# Patient Record
Sex: FEMALE | Race: WHITE | Employment: UNEMPLOYED | URBAN - METROPOLITAN AREA
[De-identification: names, ages, dates, MRNs, and addresses within clinical notes are randomized per-mention and may not be internally consistent; named-entity substitution may affect disease eponyms.]

---

## 2018-08-31 ENCOUNTER — APPOINTMENT (OUTPATIENT)
Dept: GENERAL RADIOLOGY | Age: 78
End: 2018-08-31
Payer: OTHER MISCELLANEOUS

## 2018-08-31 ENCOUNTER — HOSPITAL ENCOUNTER (OUTPATIENT)
Age: 78
Setting detail: OBSERVATION
Discharge: HOME | End: 2018-09-01
Attending: EMERGENCY MEDICINE | Admitting: SURGERY
Payer: OTHER MISCELLANEOUS

## 2018-08-31 ENCOUNTER — APPOINTMENT (OUTPATIENT)
Dept: CT IMAGING | Age: 78
End: 2018-08-31
Payer: OTHER MISCELLANEOUS

## 2018-08-31 DIAGNOSIS — T14.90XA TRAUMA: Primary | ICD-10-CM

## 2018-08-31 LAB
% INHIBITION AA: 9.2 %
% INHIBITION ADP: 18 %
ABO/RH: NORMAL
ACETAMINOPHEN LEVEL: <5 MCG/ML (ref 10–30)
ALBUMIN SERPL-MCNC: 3.9 G/DL (ref 3.5–5.2)
ALP BLD-CCNC: 92 U/L (ref 35–104)
ALT SERPL-CCNC: 10 U/L (ref 0–32)
AMPHETAMINE SCREEN, URINE: NOT DETECTED
ANGLE (CLOT STRENGTH): 77.1 DEGREE (ref 59–74)
ANION GAP SERPL CALCULATED.3IONS-SCNC: 14 MMOL/L (ref 7–16)
ANTIBODY SCREEN: NORMAL
APTT: 24.5 SEC (ref 24.5–35.1)
AST SERPL-CCNC: 16 U/L (ref 0–31)
B.E.: -4.3 MMOL/L (ref -3–3)
BACTERIA: ABNORMAL /HPF
BARBITURATE SCREEN URINE: NOT DETECTED
BENZODIAZEPINE SCREEN, URINE: NOT DETECTED
BILIRUB SERPL-MCNC: 0.4 MG/DL (ref 0–1.2)
BILIRUBIN URINE: NEGATIVE
BLOOD, URINE: ABNORMAL
BUN BLDV-MCNC: 24 MG/DL (ref 8–23)
CALCIUM SERPL-MCNC: 9.5 MG/DL (ref 8.6–10.2)
CANNABINOID SCREEN URINE: NOT DETECTED
CHLORIDE BLD-SCNC: 105 MMOL/L (ref 98–107)
CLARITY: CLEAR
CO2: 21 MMOL/L (ref 22–29)
COCAINE METABOLITE SCREEN URINE: NOT DETECTED
COHB: 0.8 % (ref 0–1.5)
COLOR: YELLOW
CREAT SERPL-MCNC: 1 MG/DL (ref 0.5–1)
CRITICAL: ABNORMAL
DATE ANALYZED: ABNORMAL
DATE OF COLLECTION: ABNORMAL
EPL-TEG: 2.7 % (ref 0–15)
ETHANOL: <10 MG/DL (ref 0–0.08)
G-TEG: 10.9 K D/SC (ref 4.5–11)
GFR AFRICAN AMERICAN: >60
GFR NON-AFRICAN AMERICAN: 53 ML/MIN/1.73
GLUCOSE BLD-MCNC: 114 MG/DL (ref 74–109)
GLUCOSE URINE: NEGATIVE MG/DL
HCO3: 19.2 MMOL/L (ref 22–26)
HCT VFR BLD CALC: 43 % (ref 34–48)
HEMOGLOBIN: 14.6 G/DL (ref 11.5–15.5)
HHB: 0.4 % (ref 0–5)
INR BLD: 1
K (CLOTTING TIME): 0.9 MIN (ref 1–3)
KETONES, URINE: NEGATIVE MG/DL
LAB: ABNORMAL
LACTIC ACID: 1.2 MMOL/L (ref 0.5–2.2)
LEUKOCYTE ESTERASE, URINE: ABNORMAL
LY30 (FIBRINOLYSIS): 2.7 % (ref 0–8)
Lab: 2102
MA (MAX AMPLITUDE): 68.5 MM (ref 50–70)
MA-AA: 66.5 MM
MA-ACTIVATED: 46.8 MM
MA-ADP: 64.6 MM
MA-TEG BASELINE: 68.5 MM
MCH RBC QN AUTO: 30.9 PG (ref 26–35)
MCHC RBC AUTO-ENTMCNC: 34 % (ref 32–34.5)
MCV RBC AUTO: 90.9 FL (ref 80–99.9)
METHADONE SCREEN, URINE: NOT DETECTED
METHB: 0.3 % (ref 0–1.5)
MODE: ABNORMAL
NITRITE, URINE: NEGATIVE
O2 CONTENT: 21.5 ML/DL
O2 SATURATION: 99.6 % (ref 92–98.5)
O2HB: 98.5 % (ref 94–97)
OPERATOR ID: 187
OPIATE SCREEN URINE: NOT DETECTED
PATIENT TEMP: 37 C
PCO2: 31.4 MMHG (ref 35–45)
PDW BLD-RTO: 14.2 FL (ref 11.5–15)
PH BLOOD GAS: 7.41 (ref 7.35–7.45)
PH UA: 5.5 (ref 5–9)
PHENCYCLIDINE SCREEN URINE: NOT DETECTED
PLATELET # BLD: 216 E9/L (ref 130–450)
PMV BLD AUTO: 10 FL (ref 7–12)
PO2: 272.6 MMHG (ref 60–100)
POTASSIUM SERPL-SCNC: 3.87 MMOL/L (ref 3.3–5.1)
POTASSIUM SERPL-SCNC: 3.9 MMOL/L (ref 3.5–5)
PROPOXYPHENE SCREEN: NOT DETECTED
PROTEIN UA: 30 MG/DL
PROTHROMBIN TIME: 11.1 SEC (ref 9.3–12.4)
R (REACTION TIME): 3 MIN (ref 5–10)
RBC # BLD: 4.73 E12/L (ref 3.5–5.5)
RBC UA: ABNORMAL /HPF (ref 0–2)
SALICYLATE, SERUM: <0.3 MG/DL (ref 0–30)
SODIUM BLD-SCNC: 140 MMOL/L (ref 132–146)
SOURCE, BLOOD GAS: ABNORMAL
SPECIFIC GRAVITY UA: >=1.03 (ref 1–1.03)
THB: 15.1 G/DL (ref 11.5–16.5)
TIME ANALYZED: 2104
TOTAL PROTEIN: 6.9 G/DL (ref 6.4–8.3)
TRICYCLIC ANTIDEPRESSANTS SCREEN SERUM: NEGATIVE NG/ML
UROBILINOGEN, URINE: 0.2 E.U./DL
WBC # BLD: 9.4 E9/L (ref 4.5–11.5)
WBC UA: ABNORMAL /HPF (ref 0–5)

## 2018-08-31 PROCEDURE — 99222 1ST HOSP IP/OBS MODERATE 55: CPT | Performed by: SURGERY

## 2018-08-31 PROCEDURE — 82805 BLOOD GASES W/O2 SATURATION: CPT

## 2018-08-31 PROCEDURE — 71250 CT THORAX DX C-: CPT

## 2018-08-31 PROCEDURE — 73630 X-RAY EXAM OF FOOT: CPT

## 2018-08-31 PROCEDURE — 86900 BLOOD TYPING SEROLOGIC ABO: CPT

## 2018-08-31 PROCEDURE — 71045 X-RAY EXAM CHEST 1 VIEW: CPT

## 2018-08-31 PROCEDURE — 85576 BLOOD PLATELET AGGREGATION: CPT

## 2018-08-31 PROCEDURE — 87088 URINE BACTERIA CULTURE: CPT

## 2018-08-31 PROCEDURE — 81001 URINALYSIS AUTO W/SCOPE: CPT

## 2018-08-31 PROCEDURE — 85730 THROMBOPLASTIN TIME PARTIAL: CPT

## 2018-08-31 PROCEDURE — 72125 CT NECK SPINE W/O DYE: CPT

## 2018-08-31 PROCEDURE — 72170 X-RAY EXAM OF PELVIS: CPT

## 2018-08-31 PROCEDURE — 99285 EMERGENCY DEPT VISIT HI MDM: CPT

## 2018-08-31 PROCEDURE — 36415 COLL VENOUS BLD VENIPUNCTURE: CPT

## 2018-08-31 PROCEDURE — G0390 TRAUMA RESPONS W/HOSP CRITI: HCPCS

## 2018-08-31 PROCEDURE — G0480 DRUG TEST DEF 1-7 CLASSES: HCPCS

## 2018-08-31 PROCEDURE — 70450 CT HEAD/BRAIN W/O DYE: CPT

## 2018-08-31 PROCEDURE — 94150 VITAL CAPACITY TEST: CPT

## 2018-08-31 PROCEDURE — 93005 ELECTROCARDIOGRAM TRACING: CPT

## 2018-08-31 PROCEDURE — 85610 PROTHROMBIN TIME: CPT

## 2018-08-31 PROCEDURE — 85347 COAGULATION TIME ACTIVATED: CPT

## 2018-08-31 PROCEDURE — 80307 DRUG TEST PRSMV CHEM ANLYZR: CPT

## 2018-08-31 PROCEDURE — 86850 RBC ANTIBODY SCREEN: CPT

## 2018-08-31 PROCEDURE — 84132 ASSAY OF SERUM POTASSIUM: CPT

## 2018-08-31 PROCEDURE — 83605 ASSAY OF LACTIC ACID: CPT

## 2018-08-31 PROCEDURE — 85384 FIBRINOGEN ACTIVITY: CPT

## 2018-08-31 PROCEDURE — 74176 CT ABD & PELVIS W/O CONTRAST: CPT

## 2018-08-31 PROCEDURE — 86901 BLOOD TYPING SEROLOGIC RH(D): CPT

## 2018-08-31 PROCEDURE — 85027 COMPLETE CBC AUTOMATED: CPT

## 2018-08-31 PROCEDURE — 2580000003 HC RX 258: Performed by: STUDENT IN AN ORGANIZED HEALTH CARE EDUCATION/TRAINING PROGRAM

## 2018-08-31 PROCEDURE — 80053 COMPREHEN METABOLIC PANEL: CPT

## 2018-08-31 RX ORDER — MORPHINE SULFATE 2 MG/ML
2 INJECTION, SOLUTION INTRAMUSCULAR; INTRAVENOUS
Status: DISCONTINUED | OUTPATIENT
Start: 2018-08-31 | End: 2018-09-01 | Stop reason: HOSPADM

## 2018-08-31 RX ORDER — SODIUM CHLORIDE 9 MG/ML
INJECTION, SOLUTION INTRAVENOUS CONTINUOUS
Status: DISCONTINUED | OUTPATIENT
Start: 2018-08-31 | End: 2018-09-01 | Stop reason: HOSPADM

## 2018-08-31 RX ORDER — ONDANSETRON 2 MG/ML
4 INJECTION INTRAMUSCULAR; INTRAVENOUS EVERY 6 HOURS PRN
Status: DISCONTINUED | OUTPATIENT
Start: 2018-08-31 | End: 2018-09-01 | Stop reason: HOSPADM

## 2018-08-31 RX ADMIN — SODIUM CHLORIDE: 9 INJECTION, SOLUTION INTRAVENOUS at 22:59

## 2018-09-01 VITALS
SYSTOLIC BLOOD PRESSURE: 145 MMHG | TEMPERATURE: 99 F | OXYGEN SATURATION: 98 % | RESPIRATION RATE: 18 BRPM | WEIGHT: 146 LBS | HEART RATE: 89 BPM | BODY MASS INDEX: 29.43 KG/M2 | DIASTOLIC BLOOD PRESSURE: 65 MMHG | HEIGHT: 59 IN

## 2018-09-01 PROBLEM — S16.1XXA NECK STRAIN: Status: ACTIVE | Noted: 2018-09-01

## 2018-09-01 PROBLEM — S06.0X0A CONCUSSION WITHOUT LOSS OF CONSCIOUSNESS: Status: ACTIVE | Noted: 2018-09-01
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
## 2018-09-01 PROBLEM — S20.20XA MULTIPLE CONTUSIONS OF TRUNK: Status: ACTIVE | Noted: 2018-09-01

## 2018-09-01 LAB
BASOPHILS ABSOLUTE: 0.02 E9/L (ref 0–0.2)
BASOPHILS RELATIVE PERCENT: 0.2 % (ref 0–2)
EOSINOPHILS ABSOLUTE: 0.06 E9/L (ref 0.05–0.5)
EOSINOPHILS RELATIVE PERCENT: 0.6 % (ref 0–6)
HCT VFR BLD CALC: 36.7 % (ref 34–48)
HEMOGLOBIN: 12.3 G/DL (ref 11.5–15.5)
IMMATURE GRANULOCYTES #: 0.05 E9/L
IMMATURE GRANULOCYTES %: 0.5 % (ref 0–5)
LYMPHOCYTES ABSOLUTE: 1.15 E9/L (ref 1.5–4)
LYMPHOCYTES RELATIVE PERCENT: 12.4 % (ref 20–42)
MCH RBC QN AUTO: 30.4 PG (ref 26–35)
MCHC RBC AUTO-ENTMCNC: 33.5 % (ref 32–34.5)
MCV RBC AUTO: 90.6 FL (ref 80–99.9)
MONOCYTES ABSOLUTE: 0.61 E9/L (ref 0.1–0.95)
MONOCYTES RELATIVE PERCENT: 6.6 % (ref 2–12)
NEUTROPHILS ABSOLUTE: 7.42 E9/L (ref 1.8–7.3)
NEUTROPHILS RELATIVE PERCENT: 79.7 % (ref 43–80)
PDW BLD-RTO: 14.2 FL (ref 11.5–15)
PLATELET # BLD: 181 E9/L (ref 130–450)
PMV BLD AUTO: 9.9 FL (ref 7–12)
RBC # BLD: 4.05 E12/L (ref 3.5–5.5)
WBC # BLD: 9.3 E9/L (ref 4.5–11.5)

## 2018-09-01 PROCEDURE — 85025 COMPLETE CBC W/AUTO DIFF WBC: CPT

## 2018-09-01 PROCEDURE — G0378 HOSPITAL OBSERVATION PER HR: HCPCS

## 2018-09-01 PROCEDURE — 2580000003 HC RX 258: Performed by: STUDENT IN AN ORGANIZED HEALTH CARE EDUCATION/TRAINING PROGRAM

## 2018-09-01 PROCEDURE — 36415 COLL VENOUS BLD VENIPUNCTURE: CPT

## 2018-09-01 RX ORDER — SODIUM CHLORIDE 0.9 % (FLUSH) 0.9 %
10 SYRINGE (ML) INJECTION PRN
Status: DISCONTINUED | OUTPATIENT
Start: 2018-09-01 | End: 2018-09-01 | Stop reason: HOSPADM

## 2018-09-01 RX ORDER — ACETAMINOPHEN 325 MG/1
650 TABLET ORAL EVERY 4 HOURS PRN
Status: DISCONTINUED | OUTPATIENT
Start: 2018-09-01 | End: 2018-09-01 | Stop reason: HOSPADM

## 2018-09-01 RX ORDER — SODIUM CHLORIDE 0.9 % (FLUSH) 0.9 %
10 SYRINGE (ML) INJECTION EVERY 12 HOURS SCHEDULED
Status: DISCONTINUED | OUTPATIENT
Start: 2018-09-01 | End: 2018-09-01 | Stop reason: HOSPADM

## 2018-09-01 RX ADMIN — SODIUM CHLORIDE: 9 INJECTION, SOLUTION INTRAVENOUS at 08:52

## 2018-09-01 ASSESSMENT — PAIN DESCRIPTION - PROGRESSION: CLINICAL_PROGRESSION: NOT CHANGED

## 2018-09-01 ASSESSMENT — PAIN SCALES - GENERAL
PAINLEVEL_OUTOF10: 5
PAINLEVEL_OUTOF10: 5

## 2018-09-01 ASSESSMENT — PAIN DESCRIPTION - LOCATION: LOCATION: FOOT

## 2018-09-01 ASSESSMENT — PAIN DESCRIPTION - PAIN TYPE: TYPE: ACUTE PAIN

## 2018-09-01 ASSESSMENT — PAIN DESCRIPTION - ONSET: ONSET: ON-GOING

## 2018-09-01 ASSESSMENT — PAIN DESCRIPTION - FREQUENCY: FREQUENCY: CONTINUOUS

## 2018-09-01 ASSESSMENT — PAIN DESCRIPTION - ORIENTATION: ORIENTATION: RIGHT

## 2018-09-01 ASSESSMENT — PAIN DESCRIPTION - DESCRIPTORS: DESCRIPTORS: ACHING;DISCOMFORT
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
## 2018-09-01 NOTE — PROGRESS NOTES
Attempted to complete patient's med rec. She states she does not know the names of medications she takes but knows she takes a med for blood pressure.
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
## 2018-09-03 LAB — URINE CULTURE, ROUTINE: NORMAL
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
## 2018-09-08 LAB
EKG ATRIAL RATE: 113 BPM
EKG P AXIS: 49 DEGREES
EKG P-R INTERVAL: 172 MS
EKG Q-T INTERVAL: 316 MS
EKG QRS DURATION: 56 MS
EKG QTC CALCULATION (BAZETT): 433 MS
EKG R AXIS: -44 DEGREES
EKG T AXIS: 91 DEGREES
EKG VENTRICULAR RATE: 113 BPM

## 2019-04-25 ENCOUNTER — HOSPITAL ENCOUNTER (EMERGENCY)
Age: 79
Discharge: HOME | DRG: 310 | End: 2019-04-25
Payer: MEDICARE

## 2019-04-25 ENCOUNTER — HOSPITAL ENCOUNTER (INPATIENT)
Age: 79
LOS: 5 days | Discharge: HOME | DRG: 310 | End: 2019-04-30
Attending: EMERGENCY MEDICINE | Admitting: INTERNAL MEDICINE
Payer: MEDICARE

## 2019-04-25 VITALS
SYSTOLIC BLOOD PRESSURE: 107 MMHG | WEIGHT: 142 LBS | RESPIRATION RATE: 20 BRPM | TEMPERATURE: 97.6 F | BODY MASS INDEX: 28.68 KG/M2 | DIASTOLIC BLOOD PRESSURE: 78 MMHG | OXYGEN SATURATION: 97 % | HEART RATE: 77 BPM

## 2019-04-25 DIAGNOSIS — R19.7 DIARRHEA, UNSPECIFIED TYPE: ICD-10-CM

## 2019-04-25 DIAGNOSIS — I48.91 NEW ONSET ATRIAL FIBRILLATION (HCC): ICD-10-CM

## 2019-04-25 DIAGNOSIS — R42 DIZZINESS: ICD-10-CM

## 2019-04-25 DIAGNOSIS — R42 LIGHTHEADEDNESS: ICD-10-CM

## 2019-04-25 DIAGNOSIS — R11.2 NON-INTRACTABLE VOMITING WITH NAUSEA, UNSPECIFIED VOMITING TYPE: ICD-10-CM

## 2019-04-25 DIAGNOSIS — I48.91 ATRIAL FIBRILLATION WITH RVR (HCC): Primary | ICD-10-CM

## 2019-04-25 DIAGNOSIS — R11.0 NAUSEA: Primary | ICD-10-CM

## 2019-04-25 PROBLEM — K52.9 GASTROENTERITIS: Status: ACTIVE | Noted: 2019-04-25

## 2019-04-25 LAB
ALBUMIN SERPL-MCNC: 3.9 G/DL (ref 3.5–5.2)
ALP BLD-CCNC: 96 U/L (ref 35–104)
ALT SERPL-CCNC: 19 U/L (ref 0–32)
ANION GAP SERPL CALCULATED.3IONS-SCNC: 14 MMOL/L (ref 7–16)
APTT: 31.7 SEC (ref 24.5–35.1)
AST SERPL-CCNC: 22 U/L (ref 0–31)
BASOPHILS ABSOLUTE: 0.04 E9/L (ref 0–0.2)
BASOPHILS RELATIVE PERCENT: 0.3 % (ref 0–2)
BILIRUB SERPL-MCNC: 0.3 MG/DL (ref 0–1.2)
BUN BLDV-MCNC: 32 MG/DL (ref 8–23)
CALCIUM SERPL-MCNC: 9.6 MG/DL (ref 8.6–10.2)
CHLORIDE BLD-SCNC: 105 MMOL/L (ref 98–107)
CO2: 19 MMOL/L (ref 22–29)
CREAT SERPL-MCNC: 1.2 MG/DL (ref 0.5–1)
EOSINOPHILS ABSOLUTE: 0.01 E9/L (ref 0.05–0.5)
EOSINOPHILS RELATIVE PERCENT: 0.1 % (ref 0–6)
GFR AFRICAN AMERICAN: 52
GFR NON-AFRICAN AMERICAN: 43 ML/MIN/1.73
GLUCOSE BLD-MCNC: 190 MG/DL (ref 74–99)
HCT VFR BLD CALC: 43.7 % (ref 34–48)
HEMOGLOBIN: 14.6 G/DL (ref 11.5–15.5)
IMMATURE GRANULOCYTES #: 0.1 E9/L
IMMATURE GRANULOCYTES %: 0.6 % (ref 0–5)
INR BLD: 1
LACTIC ACID: 2.2 MMOL/L (ref 0.5–2.2)
LIPASE: 19 U/L (ref 13–60)
LYMPHOCYTES ABSOLUTE: 1.16 E9/L (ref 1.5–4)
LYMPHOCYTES RELATIVE PERCENT: 7.5 % (ref 20–42)
MAGNESIUM: 2 MG/DL (ref 1.6–2.6)
MCH RBC QN AUTO: 30.8 PG (ref 26–35)
MCHC RBC AUTO-ENTMCNC: 33.4 % (ref 32–34.5)
MCV RBC AUTO: 92.2 FL (ref 80–99.9)
MONOCYTES ABSOLUTE: 0.36 E9/L (ref 0.1–0.95)
MONOCYTES RELATIVE PERCENT: 2.3 % (ref 2–12)
NEUTROPHILS ABSOLUTE: 13.87 E9/L (ref 1.8–7.3)
NEUTROPHILS RELATIVE PERCENT: 89.2 % (ref 43–80)
PDW BLD-RTO: 13.6 FL (ref 11.5–15)
PLATELET # BLD: 238 E9/L (ref 130–450)
PMV BLD AUTO: 10.1 FL (ref 7–12)
POTASSIUM SERPL-SCNC: 3.8 MMOL/L (ref 3.5–5)
PROTHROMBIN TIME: 11.7 SEC (ref 9.3–12.4)
RBC # BLD: 4.74 E12/L (ref 3.5–5.5)
SODIUM BLD-SCNC: 138 MMOL/L (ref 132–146)
TOTAL PROTEIN: 6.8 G/DL (ref 6.4–8.3)
TROPONIN: <0.01 NG/ML (ref 0–0.03)
WBC # BLD: 15.5 E9/L (ref 4.5–11.5)

## 2019-04-25 PROCEDURE — 83735 ASSAY OF MAGNESIUM: CPT

## 2019-04-25 PROCEDURE — 99285 EMERGENCY DEPT VISIT HI MDM: CPT

## 2019-04-25 PROCEDURE — 2500000003 HC RX 250 WO HCPCS: Performed by: STUDENT IN AN ORGANIZED HEALTH CARE EDUCATION/TRAINING PROGRAM

## 2019-04-25 PROCEDURE — 84484 ASSAY OF TROPONIN QUANT: CPT

## 2019-04-25 PROCEDURE — 96372 THER/PROPH/DIAG INJ SC/IM: CPT

## 2019-04-25 PROCEDURE — 83605 ASSAY OF LACTIC ACID: CPT

## 2019-04-25 PROCEDURE — 2060000000 HC ICU INTERMEDIATE R&B

## 2019-04-25 PROCEDURE — 6360000002 HC RX W HCPCS: Performed by: STUDENT IN AN ORGANIZED HEALTH CARE EDUCATION/TRAINING PROGRAM

## 2019-04-25 PROCEDURE — 85610 PROTHROMBIN TIME: CPT

## 2019-04-25 PROCEDURE — 6370000000 HC RX 637 (ALT 250 FOR IP): Performed by: PHYSICIAN ASSISTANT

## 2019-04-25 PROCEDURE — 93005 ELECTROCARDIOGRAM TRACING: CPT | Performed by: STUDENT IN AN ORGANIZED HEALTH CARE EDUCATION/TRAINING PROGRAM

## 2019-04-25 PROCEDURE — 99212 OFFICE O/P EST SF 10 MIN: CPT

## 2019-04-25 PROCEDURE — 85025 COMPLETE CBC W/AUTO DIFF WBC: CPT

## 2019-04-25 PROCEDURE — 85730 THROMBOPLASTIN TIME PARTIAL: CPT

## 2019-04-25 PROCEDURE — 80053 COMPREHEN METABOLIC PANEL: CPT

## 2019-04-25 PROCEDURE — 83690 ASSAY OF LIPASE: CPT

## 2019-04-25 PROCEDURE — 96374 THER/PROPH/DIAG INJ IV PUSH: CPT

## 2019-04-25 PROCEDURE — 36415 COLL VENOUS BLD VENIPUNCTURE: CPT

## 2019-04-25 PROCEDURE — 2580000003 HC RX 258: Performed by: STUDENT IN AN ORGANIZED HEALTH CARE EDUCATION/TRAINING PROGRAM

## 2019-04-25 RX ORDER — 0.9 % SODIUM CHLORIDE 0.9 %
1000 INTRAVENOUS SOLUTION INTRAVENOUS ONCE
Status: COMPLETED | OUTPATIENT
Start: 2019-04-25 | End: 2019-04-26

## 2019-04-25 RX ORDER — METOPROLOL TARTRATE 5 MG/5ML
5 INJECTION INTRAVENOUS ONCE
Status: COMPLETED | OUTPATIENT
Start: 2019-04-25 | End: 2019-04-25

## 2019-04-25 RX ORDER — PROMETHAZINE HYDROCHLORIDE 25 MG/ML
25 INJECTION, SOLUTION INTRAMUSCULAR; INTRAVENOUS ONCE
Status: COMPLETED | OUTPATIENT
Start: 2019-04-25 | End: 2019-04-25

## 2019-04-25 RX ORDER — ONDANSETRON 4 MG/1
4 TABLET, ORALLY DISINTEGRATING ORAL ONCE
Status: COMPLETED | OUTPATIENT
Start: 2019-04-25 | End: 2019-04-25

## 2019-04-25 RX ADMIN — METOPROLOL TARTRATE 5 MG: 5 INJECTION, SOLUTION INTRAVENOUS at 22:49

## 2019-04-25 RX ADMIN — SODIUM CHLORIDE 1000 ML: 9 INJECTION, SOLUTION INTRAVENOUS at 21:32

## 2019-04-25 RX ADMIN — PROMETHAZINE HYDROCHLORIDE 25 MG: 25 INJECTION INTRAMUSCULAR; INTRAVENOUS at 21:32

## 2019-04-25 RX ADMIN — ONDANSETRON 4 MG: 4 TABLET, ORALLY DISINTEGRATING ORAL at 19:44

## 2019-04-25 RX ADMIN — ENOXAPARIN SODIUM 60 MG: 60 INJECTION SUBCUTANEOUS at 22:49

## 2019-04-25 ASSESSMENT — ENCOUNTER SYMPTOMS
WHEEZING: 0
SORE THROAT: 0
DIARRHEA: 1
ABDOMINAL DISTENTION: 0
NAUSEA: 1
EYE REDNESS: 0
SHORTNESS OF BREATH: 0
EYE PAIN: 0
EYE DISCHARGE: 0
SINUS PRESSURE: 0
COUGH: 0
BACK PAIN: 0
VOMITING: 1

## 2019-04-25 NOTE — ED PROVIDER NOTES
This is an 78-year-old female presents to urgent care with her daughter. Today the patient states that she started becoming nauseated and also having some vomiting as well. Alesia Rahman His been some diarrhea patient also complains of some epigastric pain now complains of some dizziness. Patient normally states she does not have dizziness. Review of Systems   Constitutional:        Pertinent positives and negatives are stated within HPI, all other systems reviewed and are negative. Physical Exam   Constitutional: She is oriented to person, place, and time. She appears well-developed and well-nourished. HENT:   Head: Normocephalic and atraumatic. Right Ear: Hearing and external ear normal.   Left Ear: Hearing and external ear normal.   Nose: Nose normal.   Mouth/Throat: Uvula is midline, oropharynx is clear and moist and mucous membranes are normal.   Eyes: Pupils are equal, round, and reactive to light. Conjunctivae, EOM and lids are normal.   Neck: Normal range of motion. Neck supple. Cardiovascular: Normal rate, regular rhythm and normal heart sounds. No murmur heard. Pulmonary/Chest: Effort normal and breath sounds normal.   Abdominal: Soft. Bowel sounds are normal. There is tenderness in the epigastric area. There is no rigidity, no rebound, no guarding and no CVA tenderness. Musculoskeletal: She exhibits no edema. Neurological: She is alert and oriented to person, place, and time. She has normal strength. No cranial nerve deficit or sensory deficit. Coordination and gait normal. GCS eye subscore is 4. GCS verbal subscore is 5. GCS motor subscore is 6. Skin: Skin is warm and dry. No abrasion and no rash noted. Nursing note and vitals reviewed. Procedures    MDM  Number of Diagnoses or Management Options  Diarrhea, unspecified type:   Dizziness:   Nausea:   Diagnosis management comments: Patient walked back from the bathroom and had a small emesis we will give her some Zofran here.  She still feels dizzy I spoke with the daughter at length she needs to go to the emergency department tonight she will take her to the hospital. Patient is stable condition here.         --------------------------------------------- PAST HISTORY ---------------------------------------------  Past Medical History:  has a past medical history of Anxiety, Cancer ([address] DJD (degenerative joint disease), Hyperlipidemia, Hypertension, Irregular heart beat, Osteoarthritis, Preoperative clearance, Renal calculi, and Ureteral perforation secondary to stent manipulation. Past Surgical History:  has a past surgical history that includes Cholecystectomy; Hysterectomy; skin biopsy; other surgical history (05-08-15); Lithotripsy (Left, 8/05/2015); Total hip arthroplasty (Right, 06/02/2014); and Total hip arthroplasty (Left, 07/18/2016). Social History:  reports that she has never smoked. She has never used smokeless tobacco. She reports that she drinks alcohol. She reports that she does not use drugs. Family History: family history includes Heart Disease in her father; Kidney Disease in her mother. The patients home medications have been reviewed. Allergies: Iodine; Other; Cefoxitin sodium in dextrose; Hycomine compound [pe-cpm-hydrocod-apap-caff]; Iodine; and Mefoxin [cefoxitin]    -------------------------------------------------- RESULTS -------------------------------------------------  No results found for this visit on 04/25/19. No orders to display       ------------------------- NURSING NOTES AND VITALS REVIEWED ---------------------------   The nursing notes within the ED encounter and vital signs as below have been reviewed.    /78   Pulse 77   Temp 97.6 °F (36.4 °C) (Oral)   Resp 20   Wt 142 lb (64.4 kg)   SpO2 97%   BMI 28.68 kg/m²   Oxygen Saturation Interpretation: Normal      ------------------------------------------ PROGRESS NOTES ------------------------------------------   I have spoken

## 2019-04-25 NOTE — ED NOTES
Provider talked  To pt and family  Instructed to go to er for further treatment   Daughter taking per private car     Jovan Aponte, MURTAZA  62/81/13 [id number]

## 2019-04-25 NOTE — LETTER
Beneficiary Notification Letter     This Braydon Evans Provider is Participating in an Innovative Payment and [address] [place] from Medicare     Greetings:   Sherry Ayala is participating in a Medicare initiative called the [facility] for [address]. You are receiving this letter because your health care provider has identified you as a patient who is receiving care through this initiative. Health care providers participating in the [facility] [address], including Sherry Ayala, will work with Medicare to improve care for patients. Your Medicare rights have not been changed. You still have all the same Medicare rights and protections, including the right to choose which hospital, doctor, or other health care provider you see. However, because Sherry Ayala chose to participate in the [address] [place] [address], all Medicare beneficiaries who meet the eligibility criteria of this initiative will receive care under the initiative. If you do not wish to receive care under the Bundled Payments [address], you must choose a health care provider that does not participate in this initiative for your care. Regardless of which health care provider you see, Medicare will continue to cover all of your medically necessary services. Bundled Payments for Care Improvement Advanced aims to help improve your care     The Bundled Payments [place] [id number] [facility] is an innovative Medicare initiative that encourages your doctors, hospitals, and other health care providers to work more closely together so you get better care during and following certain hospital stays.  In this initiative, doctors and hospitals may work closely with certain health care providers and suppliers that help patients recover after discharge from the hospital, including skilled nursing facilities, home health agencies, inpatient rehabilitation facilities, and long term care hospitals. Sherry [employer] is working closely with the doctors and other health care providers that care for you during and following your hospital stay and for a period of time after you leave the hospital. By working together, the health care providers are trying to more efficiently provide well-managed, high quality, patient-centered care as you undergo treatment. Hospitals, doctors, and other health care providers that care for you following a hospital stay may receive an additional payment for providing better, more coordinated health care. Medicare will monitor your care to make sure you and others get high quality care. Your feedback is important     Medicare may also ask you to answer a survey about the services and care you received from [facility] will be mailed to you. Your feedback will improve care for all people with Medicare who receive care from [employer]. Completion of this survey is optional.     Get more information     For more information about the Bundled Payments for [address] [place] [address], you can:    · Visit the CMS BPCI Advanced Website at http://phillips-hyman.net/ initiatives/bpci-advanced   · Call the [facility] BPCI-A team at ([phone]. · Call 1-800-MEDICARE (3-666.377.7043). TTY users can call 0-809.225.5222     If you have concerns or complaints about your care, talk to your health care provider, or contact your Beneficiary and Family Centered Quality Improvement Organization [facility]). To get your CC-QIO's phone number, visit Medicare.gov/contacts or call 1-800-MEDICARE. · To find a different hospital, visit www. hospitalcompare.[facility].gov or call 1-800- MEDICARE (7-285.584.2019). TTY users should call 6-868.688.4840. · To find a different doctor, visit Medicare's Physician Compare website, [employer].co.nz, or call 1-800-MEDICARE (526 1279). TTY users should call 0-682.207.1517. · To find a different skilled nursing facility, visit [facility] website, https://www.[employer]/, or call 1-800-MEDICARE (1- 750.168.2848). TTY users should call 3-987.421.9807. · To find a different long term care hospital, visit [facility] [address] Compare website, [employer].be, or call 1-800- MEDICARE (652 9787). TTY users should call 8-897.548.8366. · To find a different inpatient rehabilitation facility, visit [address] [facility] [address] Compare website, www.medicare.gov/ inpatientrehabilitation facilitycompare, or call 1-800-MEDICARE (5-385.948.3209). TTY users should call 8- 972.905.7762. · To find a different home health agency, visit [address] website, www.medicare.gov/homehealthcompare, or call 1-800-MEDICARE (7-878- 313-0164). TTY users should call [phone].

## 2019-04-26 ENCOUNTER — APPOINTMENT (OUTPATIENT)
Dept: GENERAL RADIOLOGY | Age: 79
DRG: 310 | End: 2019-04-26
Payer: MEDICARE

## 2019-04-26 LAB
HBA1C MFR BLD: 5.5 % (ref 4–5.6)
LV EF: 76 %
LVEF MODALITY: NORMAL
METER GLUCOSE: 120 MG/DL (ref 74–99)
METER GLUCOSE: 135 MG/DL (ref 74–99)
METER GLUCOSE: 97 MG/DL (ref 74–99)
TROPONIN: <0.01 NG/ML (ref 0–0.03)

## 2019-04-26 PROCEDURE — 84484 ASSAY OF TROPONIN QUANT: CPT

## 2019-04-26 PROCEDURE — 97116 GAIT TRAINING THERAPY: CPT | Performed by: PHYSICAL THERAPIST

## 2019-04-26 PROCEDURE — 6360000002 HC RX W HCPCS: Performed by: NURSE PRACTITIONER

## 2019-04-26 PROCEDURE — 6370000000 HC RX 637 (ALT 250 FOR IP): Performed by: INTERNAL MEDICINE

## 2019-04-26 PROCEDURE — 71046 X-RAY EXAM CHEST 2 VIEWS: CPT

## 2019-04-26 PROCEDURE — 2060000000 HC ICU INTERMEDIATE R&B

## 2019-04-26 PROCEDURE — 82962 GLUCOSE BLOOD TEST: CPT

## 2019-04-26 PROCEDURE — 6360000002 HC RX W HCPCS: Performed by: INTERNAL MEDICINE

## 2019-04-26 PROCEDURE — 2580000003 HC RX 258: Performed by: NURSE PRACTITIONER

## 2019-04-26 PROCEDURE — 36415 COLL VENOUS BLD VENIPUNCTURE: CPT

## 2019-04-26 PROCEDURE — 97161 PT EVAL LOW COMPLEX 20 MIN: CPT | Performed by: PHYSICAL THERAPIST

## 2019-04-26 PROCEDURE — 83036 HEMOGLOBIN GLYCOSYLATED A1C: CPT

## 2019-04-26 PROCEDURE — 93306 TTE W/DOPPLER COMPLETE: CPT

## 2019-04-26 PROCEDURE — 99233 SBSQ HOSP IP/OBS HIGH 50: CPT | Performed by: INTERNAL MEDICINE

## 2019-04-26 PROCEDURE — 2580000003 HC RX 258: Performed by: INTERNAL MEDICINE

## 2019-04-26 RX ORDER — METOPROLOL TARTRATE 5 MG/5ML
5 INJECTION INTRAVENOUS EVERY 8 HOURS PRN
Status: DISCONTINUED | OUTPATIENT
Start: 2019-04-26 | End: 2019-04-29

## 2019-04-26 RX ORDER — LORAZEPAM 0.5 MG/1
0.5 TABLET ORAL EVERY 4 HOURS PRN
Status: DISCONTINUED | OUTPATIENT
Start: 2019-04-26 | End: 2019-04-29

## 2019-04-26 RX ORDER — SODIUM CHLORIDE 0.9 % (FLUSH) 0.9 %
10 SYRINGE (ML) INJECTION PRN
Status: DISCONTINUED | OUTPATIENT
Start: 2019-04-26 | End: 2019-04-30 | Stop reason: HOSPADM

## 2019-04-26 RX ORDER — DEXTROSE MONOHYDRATE 25 G/50ML
12.5 INJECTION, SOLUTION INTRAVENOUS PRN
Status: DISCONTINUED | OUTPATIENT
Start: 2019-04-26 | End: 2019-04-30 | Stop reason: HOSPADM

## 2019-04-26 RX ORDER — DIGOXIN 125 MCG
125 TABLET ORAL DAILY
Status: DISCONTINUED | OUTPATIENT
Start: 2019-04-27 | End: 2019-04-30 | Stop reason: HOSPADM

## 2019-04-26 RX ORDER — SODIUM CHLORIDE 9 MG/ML
INJECTION, SOLUTION INTRAVENOUS CONTINUOUS
Status: DISCONTINUED | OUTPATIENT
Start: 2019-04-26 | End: 2019-04-29

## 2019-04-26 RX ORDER — DIGOXIN 0.25 MG/ML
125 INJECTION INTRAMUSCULAR; INTRAVENOUS ONCE
Status: COMPLETED | OUTPATIENT
Start: 2019-04-26 | End: 2019-04-26

## 2019-04-26 RX ORDER — MECLIZINE HCL 12.5 MG/1
12.5 TABLET ORAL 3 TIMES DAILY PRN
Status: DISCONTINUED | OUTPATIENT
Start: 2019-04-26 | End: 2019-04-27

## 2019-04-26 RX ORDER — ONDANSETRON 2 MG/ML
4 INJECTION INTRAMUSCULAR; INTRAVENOUS EVERY 6 HOURS PRN
Status: DISCONTINUED | OUTPATIENT
Start: 2019-04-26 | End: 2019-04-30 | Stop reason: HOSPADM

## 2019-04-26 RX ORDER — DEXTROSE MONOHYDRATE 50 MG/ML
100 INJECTION, SOLUTION INTRAVENOUS PRN
Status: DISCONTINUED | OUTPATIENT
Start: 2019-04-26 | End: 2019-04-30 | Stop reason: HOSPADM

## 2019-04-26 RX ORDER — SODIUM CHLORIDE 0.9 % (FLUSH) 0.9 %
10 SYRINGE (ML) INJECTION EVERY 12 HOURS SCHEDULED
Status: DISCONTINUED | OUTPATIENT
Start: 2019-04-26 | End: 2019-04-30 | Stop reason: HOSPADM

## 2019-04-26 RX ORDER — NICOTINE POLACRILEX 4 MG
15 LOZENGE BUCCAL PRN
Status: DISCONTINUED | OUTPATIENT
Start: 2019-04-26 | End: 2019-04-30 | Stop reason: HOSPADM

## 2019-04-26 RX ADMIN — Medication 10 ML: at 20:07

## 2019-04-26 RX ADMIN — METOPROLOL TARTRATE 25 MG: 25 TABLET ORAL at 23:13

## 2019-04-26 RX ADMIN — ENOXAPARIN SODIUM 60 MG: 60 INJECTION SUBCUTANEOUS at 20:06

## 2019-04-26 RX ADMIN — ENOXAPARIN SODIUM 40 MG: 40 INJECTION SUBCUTANEOUS at 08:29

## 2019-04-26 RX ADMIN — SODIUM CHLORIDE: 9 INJECTION, SOLUTION INTRAVENOUS at 01:20

## 2019-04-26 RX ADMIN — ONDANSETRON 4 MG: 2 INJECTION INTRAMUSCULAR; INTRAVENOUS at 08:29

## 2019-04-26 RX ADMIN — ONDANSETRON 4 MG: 2 INJECTION INTRAMUSCULAR; INTRAVENOUS at 20:09

## 2019-04-26 RX ADMIN — DIGOXIN 125 MCG: 0.25 INJECTION INTRAMUSCULAR; INTRAVENOUS at 16:33

## 2019-04-26 RX ADMIN — METOPROLOL TARTRATE 25 MG: 25 TABLET ORAL at 12:13

## 2019-04-26 RX ADMIN — MECLIZINE 12.5 MG: 12.5 TABLET ORAL at 20:03

## 2019-04-26 RX ADMIN — SODIUM CHLORIDE: 9 INJECTION, SOLUTION INTRAVENOUS at 20:07

## 2019-04-26 NOTE — PROGRESS NOTES
[address] [place], [place] [address]ist   Progress Note    Admitting Date and Time: 4/25/2019  8:20 PM  Admit Dx: Atrial fibrillation with RVR ([address] [I48.91]    Subjective: Patient anxious this a.m. Her major complaints include nausea without vomiting, vertigo with a spinning sensation in her head, and nonproductive cough. She denies chest pain or palpitations. She feels generally better than on admission via the ED. ROS: denies fever, chills, cp, sob, n/v, HA unless stated above.  sodium chloride flush  10 mL Intravenous 2 times per day    metoprolol tartrate  25 mg Oral BID    enoxaparin  1 mg/kg Subcutaneous BID       sodium chloride flush 10 mL PRN   magnesium hydroxide 30 mL Daily PRN   ondansetron 4 mg Q6H PRN   perflutren lipid microspheres 1.5 mL ONCE PRN   metoprolol 5 mg Q8H PRN   LORazepam 0.5 mg Q4H PRN   meclizine 12.5 mg TID PRN        Objective:    /83   Pulse 119   Temp 98.8 °F (37.1 °C) (Oral)   Resp 18   Ht 4' 11\" (1.499 m)   Wt 142 lb (64.4 kg)   SpO2 96%   BMI 28.68 kg/m²   General Appearance: alert and oriented to person, place and time and in no acute distress  Skin: warm and dry mild pallor  Head: normocephalic and atraumatic  Eyes: pupils equal, round, and reactive to light, extraocular eye movements intact, conjunctivae normal; no nystagmus  Neck: neck supple and non tender without mass   Pulmonary/Chest: clear to auscultation bilaterally- no wheezes, rales or rhonchi, normal air movement, no respiratory distress  Cardiovascular: Heart has an irregular irregularity with variable intensity of S1. There is a grade 2 systolic ejection murmur throughout the precordium with radiation into the right carotid. There is no JVD at 30°.    Abdomen: soft, non-tender, non-distended, normal bowel sounds, no masses or organomegaly  Extremities: no cyanosis, no clubbing and no edema  Neurologic: no cranial nerve deficit and speech normal    Cardiac telemetry shows atrial

## 2019-04-26 NOTE — H&P
HISTORY  05-08-15    Cystoscopy retrograde pyelogram    SKIN BIOPSY      skin cancer nose    TOTAL HIP ARTHROPLASTY Right 06/02/2014    right hip ZANE Clayton MD    TOTAL HIP ARTHROPLASTY Left 07/18/2016    left hip ZANE Clayton MD       Medications Prior to Admission:    Prior to Admission medications    Medication Sig Start Date End Date Taking? Authorizing Provider   hydrochlorothiazide (HYDRODIURIL) 25 MG tablet Take 0.5 tablets by mouth daily for 14 days 10/12/17 10/26/17  TISHA Leach   amLODIPine (NORVASC) 10 MG tablet Take 10 mg by mouth daily Instructed to take am of procedure    Historical Provider, MD   Cholecalciferol (VITAMIN D3) 50293 UNITS CAPS Take by mouth every 7 days LD 7/12/16    Historical Provider, MD   Multiple Vitamins-Minerals (RA VISION-MARIA ELENA PRESERVE PO) Take by mouth daily LD 7/12/16    Historical Provider, MD       Allergies:    Iodine; Other; Cefoxitin sodium in dextrose; Hycomine compound [pe-cpm-hydrocod-apap-caff]; Iodine; and Mefoxin [cefoxitin]    Social History:    reports that she has never smoked. She has never used smokeless tobacco. She reports that she drinks alcohol. She reports that she does not use drugs. Family History:   family history includes Heart Disease in her father; Kidney Disease in her mother. PHYSICAL EXAM:  Vitals:  /66   Pulse 120   Temp 97.8 °F (36.6 °C) (Oral)   Resp 22   Ht 4' 11\" (1.499 m)   Wt 142 lb (64.4 kg)   SpO2 92%   BMI 28.68 kg/m²     Physical Exam   Constitutional: She is oriented to person, place, and time. Alert and oriented ×3. Hard of hearing. No acute distress. HENT:   Mouth/Throat: Mucous membranes are dry. No oropharyngeal exudate, posterior oropharyngeal edema or posterior oropharyngeal erythema. Cardiovascular: Normal heart sounds and intact distal pulses. An irregularly irregular rhythm present. Tachycardia present. No murmur heard.   Pulmonary/Chest: Effort normal and breath sounds normal. No stridor. No respiratory distress. She has no wheezes. She has no rales. Abdominal: Soft. She exhibits no distension and no mass. There is no tenderness. There is no rebound and no guarding. Musculoskeletal:   No peripheral edema, erythema, or temperature discrepancy when comparing bilateral lower extremities. Neurological: She is alert and oriented to person, place, and time. Skin: Skin is warm and dry. Nursing note and vitals reviewed. LABS:  Recent Labs     04/25/19  2142      K 3.8      CO2 19*   BUN 32*   CREATININE 1.2*   GLUCOSE 190*   CALCIUM 9.6       Recent Labs     04/25/19  2142   WBC 15.5*   RBC 4.74   HGB 14.6   HCT 43.7   MCV 92.2   MCH 30.8   MCHC 33.4   RDW 13.6      MPV 10.1       No results for input(s): POCGLU in the last 72 hours. Radiology: No results found. ASSESSMENT:      Principal Problem:    Atrial fibrillation with RVR ([facility])  Active Problems:    HTN (hypertension)    Cancer of endometrium ([address]    Gastroenteritis  Resolved Problems:    * No resolved hospital problems. 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  Every effort was made to ensure accuracy; however, inadvertent computerized transcription errors may be present.     Electronically signed by PRIYANKA Salinas CNP on 4/25/2019 at 11:11 PM

## 2019-04-26 NOTE — PROGRESS NOTES
HYSTERECTOMY      LITHOTRIPSY Left 8/05/2015    LEFT RENAL CALCULI    OTHER SURGICAL HISTORY  05-08-15    Cystoscopy retrograde pyelogram    SKIN BIOPSY      skin cancer nose    TOTAL HIP ARTHROPLASTY Right 06/02/2014    right hip ZANE Clayton MD    TOTAL HIP ARTHROPLASTY Left 07/18/2016    left hip ZANE Clayton MD       The admitting diagnosis and active problem list as listed above have been reviewed prior to the initiation of this evaluation. Nursing cleared the patient for evaluation. Patient is agreeable to evaluation. Precautions: falls  , hard of hearing   Social history: Patient lives alone  in a single family home with   10 steps to enter      rail  Equipment owned: none,    Mentation: alert, cooperative, oriented x 3 and follows directions,    Prior level of function: independent   ROM: within functional limits with exception of right shoulder limtied and currently in outpatient p.t. STRENGTH: 4/5  PAIN: (measured on a visual analog scale with 0=no pain and 10=excruciating pain) 3/10. FUNCTIONAL ASSESSMENT   Bed Mobility- Supine to sit-Minimal assists       Scooting- Minimal assists       Sit to supine- Minimal assists     Patient able to dangle on edge of bed for 20 minutes with no assist   Transfers-sit to stand- Minimal assists     Gait:  Patient ambulated 150 feet using  hand held assist   With Minimal assists d/t decreased balance ; worsens with head turns      Balance: sit-good         stand fair  minus    Edema: no  Endurance: poor      Treatment:evaluation;  gait  Patient/family education:effects of immobilty      Rehab Potential: good  for baseline  Patients Goal: have less dizziness  ASSESSMENT  Patient exhibits decreased   strength, endurance,  balance, coordination impairing functional mobility. Goals to be met in 3 days.   Bed mobility-Supervision  Transfers-Supervision  Ambulation-Supervision for 150 feet using   No device   Stairs-up and down 10  step(s) using 1 rail(s) with Supervision   Comments:       Increase strength in affected muscle groups by 1/3 grade  Increase balance to allow for improvement towards functional goals. Increase endurance to allow for improvement towards functional goals.

## 2019-04-26 NOTE — PROGRESS NOTES
See dictated cardiology consult for evaluation, impression, and recommendations. 77-year-old patient with paroxysmal atrial fibrillation leading to persistent atrial fibrillation. Additionally, she has a lot of emotional stress, possibly gastro-enteritis or diverticulitis, and lightheadedness. Let's control the heart rate and do further workup on the GI system. Daivd Aland Malon Olszewski M.D.,[facility], [place]band[place] 4/26/2019 2:49 PM

## 2019-04-26 NOTE — DISCHARGE INSTR - COC
lb (64.4 kg)   SpO2 97%   BMI 28.68 kg/m²     Last documented pain score (0-10 scale):    Last Weight:   Wt Readings from Last 1 Encounters:   19 142 lb (64.4 kg)     Mental Status:  {IP PT MENTAL STATUS:}    IV Access:  { ZACK IV ACCESS:533120258}    Nursing Mobility/ADLs:  Walking   {CHP DME WWJV:546535384}  Transfer  {P DME QYAH:015403235}  Bathing  {CHP DME GWUJ:722825498}  Dressing  {CHP DME HQKC:851362806}  Toileting  {CHP DME TWFO:495039315}  Feeding  {CHP DME KORJ:658172046}  Med Admin  {P DME EDBF:764066347}  Med Delivery   { ZACK MED Delivery:735464291}    Wound Care Documentation and Therapy:  Incision 14 Hip Right (Active)   Number of days: 1788       Incision 08/05/15 Back Left (Active)   Number of days: 2579       Incision 16 Hip Left (Active)   Number of days: 1011        Elimination:  Continence:   · Bowel: {YES / VV:96181}  · Bladder: {YES / LB:20509}  Urinary Catheter: {Urinary Catheter:763726875}   Colostomy/Ileostomy/Ileal Conduit: {YES / XO:85091}       Date of Last BM: ***  No intake or output data in the 24 hours ending 19 2233  No intake/output data recorded.     Safety Concerns:     [employer] Safety Concerns:312172934}    Impairments/Disabilities:      [employer] Impairments/Disabilities:969972089}    Nutrition Therapy:  Current Nutrition Therapy:   [employer] Diet List:877240962}    Routes of Feeding: {P DME Other Feedings:678733694}  Liquids: {Slp liquid thickness:77247}  Daily Fluid Restriction: {CHP DME Yes amt example:738932657}  Last Modified Barium Swallow with Video (Video Swallowing Test): {Done Not Done UZAI:095819147}    Treatments at the Time of Hospital Discharge:   Respiratory Treatments: ***  Oxygen Therapy:  {Therapy; copd oxygen:27146}  Ventilator:    { CC Vent PWMD:364837444}    Rehab Therapies: {THERAPEUTIC INTERVENTION:4760020231}  Weight Bearing Status/Restrictions: [address] [employer] Weight Bearin}  Other Medical Equipment (for information only, NOT a DME order):  {EQUIPMENT:221498838}  Other Treatments: ***    Patient's personal belongings (please select all that are sent with patient):  {CHP DME Belongings:470532443}    RN SIGNATURE:  {Esignature:530633329}    CASE MANAGEMENT/SOCIAL WORK SECTION    Inpatient Status Date: ***    Readmission Risk Assessment Score:  Readmission Risk              Risk of Unplanned Readmission:        0           Discharging to Facility/ Agency   · Name:   · Address:  · Phone:  · Fax:    Dialysis Facility (if applicable)   · Name:  · Address:  · Dialysis Schedule:  · Phone:  · Fax:    / signature: {Esignature:747499740}    PHYSICIAN SECTION    Prognosis: {Prognosis:7518815595}    Condition at Discharge: [address] [place] [address] Patient Condition:926474284}    Rehab Potential (if transferring to Rehab): {Prognosis:8861096236}    Recommended Labs or Other Treatments After Discharge: ***    Physician Certification: I certify the above information and transfer of Quiana Hannah  is necessary for the continuing treatment of the diagnosis listed and that she requires {Admit to Appropriate Level of Care:48309} for {GREATER/LESS:606779838} 30 days.      Update Admission H&P: {CHP DME Changes in SCZNZ:923162795}    PHYSICIAN SIGNATURE:  {Esignature:757766777}

## 2019-04-26 NOTE — ED PROVIDER NOTES
Alert and oriented ×3. Hard of hearing. No acute distress. HENT:   Mouth/Throat: Mucous membranes are dry. No oropharyngeal exudate, posterior oropharyngeal edema or posterior oropharyngeal erythema. Cardiovascular: Normal heart sounds and intact distal pulses. An irregularly irregular rhythm present. Tachycardia present. No murmur heard. Pulmonary/Chest: Effort normal and breath sounds normal. No stridor. No respiratory distress. She has no wheezes. She has no rales. Abdominal: Soft. She exhibits no distension and no mass. There is no tenderness. There is no rebound and no guarding. Musculoskeletal:   No peripheral edema, erythema, or temperature discrepancy when comparing bilateral lower extremities. Neurological: She is alert and oriented to person, place, and time. Skin: Skin is warm and dry. Nursing note and vitals reviewed. Procedures    MDM         EKG Interpretation    Interpreted by emergency department physician    Clinical Impression: Atrial fibrillation with rapid ventricular response at 101 bpm. No ST segment elevations or depressions. No T-wave abnormalities or inversions. When compared to old EKG from December 12, 2017, the patient was not in atrial fibrillation at that time. Kirsten Croft          --------------------------------------------- PAST HISTORY ---------------------------------------------  Past Medical History:  has a past medical history of Anxiety, Cancer ([facility] [address] DJD (degenerative joint disease), Hyperlipidemia, Hypertension, Irregular heart beat, Osteoarthritis, Preoperative clearance, Renal calculi, and Ureteral perforation secondary to stent manipulation. Past Surgical History:  has a past surgical history that includes Cholecystectomy; Hysterectomy; skin biopsy; other surgical history (05-08-15); Lithotripsy (Left, 8/05/2015); Total hip arthroplasty (Right, 06/02/2014); and Total hip arthroplasty (Left, 07/18/2016).     Social History:  reports that she has never smoked. She has never used smokeless tobacco. She reports that she drinks alcohol. She reports that she does not use drugs. Family History: family history includes Heart Disease in her father; Kidney Disease in her mother. The patients home medications have been reviewed. Allergies: Iodine; Other; Cefoxitin sodium in dextrose; Hycomine compound [pe-cpm-hydrocod-apap-caff];  Iodine; and Mefoxin [cefoxitin]    -------------------------------------------------- RESULTS -------------------------------------------------    Lab  Results for orders placed or performed during the hospital encounter of 04/25/19   CBC auto differential   Result Value Ref Range    WBC 15.5 (H) 4.5 - 11.5 E9/L    RBC 4.74 3.50 - 5.50 E12/L    Hemoglobin 14.6 11.5 - 15.5 g/dL    Hematocrit 43.7 34.0 - 48.0 %    MCV 92.2 80.0 - 99.9 fL    MCH 30.8 26.0 - 35.0 pg    MCHC 33.4 32.0 - 34.5 %    RDW 13.6 11.5 - 15.0 fL    Platelets 665 066 - 468 E9/L    MPV 10.1 7.0 - 12.0 fL    Neutrophils % 89.2 (H) 43.0 - 80.0 %    Immature Granulocytes % 0.6 0.0 - 5.0 %    Lymphocytes % 7.5 (L) 20.0 - 42.0 %    Monocytes % 2.3 2.0 - 12.0 %    Eosinophils % 0.1 0.0 - 6.0 %    Basophils % 0.3 0.0 - 2.0 %    Neutrophils # 13.87 (H) 1.80 - 7.30 E9/L    Immature Granulocytes # 0.10 E9/L    Lymphocytes # 1.16 (L) 1.50 - 4.00 E9/L    Monocytes # 0.36 0.10 - 0.95 E9/L    Eosinophils # 0.01 (L) 0.05 - 0.50 E9/L    Basophils # 0.04 0.00 - 0.20 E9/L   Comprehensive Metabolic Panel   Result Value Ref Range    Sodium 138 132 - 146 mmol/L    Potassium 3.8 3.5 - 5.0 mmol/L    Chloride 105 98 - 107 mmol/L    CO2 19 (L) 22 - 29 mmol/L    Anion Gap 14 7 - 16 mmol/L    Glucose 190 (H) 74 - 99 mg/dL    BUN 32 (H) 8 - 23 mg/dL    CREATININE 1.2 (H) 0.5 - 1.0 mg/dL    GFR Non-African American 43 >=60 mL/min/1.73    GFR African American 52     Calcium 9.6 8.6 - 10.2 mg/dL    Total Protein 6.8 6.4 - 8.3 g/dL    Alb 3.9 3.5 - 5.2 g/dL    Total Bilirubin 0.3 0.0 - 1.2 mg/dL    Alkaline Phosphatase 96 35 - 104 U/L    ALT 19 0 - 32 U/L    AST 22 0 - 31 U/L   Lactic Acid, Plasma   Result Value Ref Range    Lactic Acid 2.2 0.5 - 2.2 mmol/L   Lipase   Result Value Ref Range    Lipase 19 13 - 60 U/L   Troponin   Result Value Ref Range    Troponin <0.01 0.00 - 0.03 ng/mL   Magnesium   Result Value Ref Range    Magnesium 2.0 1.6 - 2.6 mg/dL   EKG 12 Lead   Result Value Ref Range    Ventricular Rate 101 BPM    Atrial Rate 144 BPM    QRS Duration 70 ms    Q-T Interval 364 ms    QTc Calculation (Bazett) 471 ms    R Axis -21 degrees    T Axis 121 degrees       Radiology  No orders to display       ------------------------- NURSING NOTES AND VITALS REVIEWED ---------------------------  Date / Time Roomed:  4/25/2019  8:20 PM  ED Bed Assignment:  [id number]    The nursing notes within the ED encounter and vital signs as below have been reviewed. Patient Vitals for the past 24 hrs:   BP Temp Temp src Pulse Resp SpO2 Height Weight   04/25/19 2017 (!) 146/70 97.8 °F (36.6 °C) Oral 98 20 97 % 4' 11\" (1.499 m) 142 lb (64.4 kg)       Oxygen Saturation Interpretation: Normal      ------------------------------------------ PROGRESS NOTES ------------------------------------------  Re-evaluation(s):    .10:36 PM patient in atrial fibrillation with RVR at this time. Has received approximately 500 mL of her normal saline bolus. We will slow the patient's heart rate with metoprolol and add Lovenox the patient's chadsvasc score is a 4. I have spoken with the patient and discussed todays results, in addition to providing specific details for the plan of care and counseling regarding the diagnosis and prognosis. Their questions are answered at this time and they are agreeable with the plan.      --------------------------------- ADDITIONAL PROVIDER NOTES ---------------------------------  Consultations:  Spoke with Cam Hannah CNP for Hospitalist group. They will admit this patient.     This patient's ED course included: a personal history and physicial examination, re-evaluation prior to disposition, multiple bedside re-evaluations, IV medications, cardiac monitoring, continuous pulse oximetry and complex medical decision making and emergency management    This patient has been closely monitored during their ED course. Please note that the withdrawal or failure to initiate urgent interventions for this patient would likely result in a life threatening deterioration or permanent disability. Accordingly this patient received 0 minutes of critical care time, excluding separately billable procedures. Systems at risk for deterioration include: cardiovascular, GI. Clinical Impression  1. Atrial fibrillation with RVR ([address]    2. New onset atrial fibrillation ([address]    3. Non-intractable vomiting with nausea, unspecified vomiting type    4. Lightheadedness          Disposition  Patient's disposition: Admit to telemetry  Patient's condition is stable.        Denis Claude, DO  Resident  04/25/19 4625
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
## 2019-04-27 LAB
ALBUMIN SERPL-MCNC: 3.4 G/DL (ref 3.5–5.2)
ALP BLD-CCNC: 80 U/L (ref 35–104)
ALT SERPL-CCNC: 26 U/L (ref 0–32)
ANION GAP SERPL CALCULATED.3IONS-SCNC: 9 MMOL/L (ref 7–16)
AST SERPL-CCNC: 22 U/L (ref 0–31)
BASOPHILS ABSOLUTE: 0.02 E9/L (ref 0–0.2)
BASOPHILS RELATIVE PERCENT: 0.2 % (ref 0–2)
BILIRUB SERPL-MCNC: 0.3 MG/DL (ref 0–1.2)
BUN BLDV-MCNC: 28 MG/DL (ref 8–23)
CALCIUM SERPL-MCNC: 8.8 MG/DL (ref 8.6–10.2)
CHLORIDE BLD-SCNC: 111 MMOL/L (ref 98–107)
CO2: 20 MMOL/L (ref 22–29)
CREAT SERPL-MCNC: 1 MG/DL (ref 0.5–1)
EKG ATRIAL RATE: 144 BPM
EKG Q-T INTERVAL: 364 MS
EKG QRS DURATION: 70 MS
EKG QTC CALCULATION (BAZETT): 471 MS
EKG R AXIS: -21 DEGREES
EKG T AXIS: 121 DEGREES
EKG VENTRICULAR RATE: 101 BPM
EOSINOPHILS ABSOLUTE: 0.03 E9/L (ref 0.05–0.5)
EOSINOPHILS RELATIVE PERCENT: 0.4 % (ref 0–6)
GFR AFRICAN AMERICAN: >60
GFR NON-AFRICAN AMERICAN: 53 ML/MIN/1.73
GLUCOSE BLD-MCNC: 108 MG/DL (ref 74–99)
HCT VFR BLD CALC: 37.8 % (ref 34–48)
HEMOGLOBIN: 12.3 G/DL (ref 11.5–15.5)
IMMATURE GRANULOCYTES #: 0.03 E9/L
IMMATURE GRANULOCYTES %: 0.4 % (ref 0–5)
LYMPHOCYTES ABSOLUTE: 1.25 E9/L (ref 1.5–4)
LYMPHOCYTES RELATIVE PERCENT: 14.8 % (ref 20–42)
MAGNESIUM: 2 MG/DL (ref 1.6–2.6)
MCH RBC QN AUTO: 30.3 PG (ref 26–35)
MCHC RBC AUTO-ENTMCNC: 32.5 % (ref 32–34.5)
MCV RBC AUTO: 93.1 FL (ref 80–99.9)
METER GLUCOSE: 102 MG/DL (ref 74–99)
METER GLUCOSE: 87 MG/DL (ref 74–99)
MONOCYTES ABSOLUTE: 0.39 E9/L (ref 0.1–0.95)
MONOCYTES RELATIVE PERCENT: 4.6 % (ref 2–12)
NEUTROPHILS ABSOLUTE: 6.75 E9/L (ref 1.8–7.3)
NEUTROPHILS RELATIVE PERCENT: 79.6 % (ref 43–80)
PDW BLD-RTO: 13.9 FL (ref 11.5–15)
PHOSPHORUS: 2.7 MG/DL (ref 2.5–4.5)
PLATELET # BLD: 184 E9/L (ref 130–450)
PMV BLD AUTO: 10.1 FL (ref 7–12)
POTASSIUM SERPL-SCNC: 4.1 MMOL/L (ref 3.5–5)
RBC # BLD: 4.06 E12/L (ref 3.5–5.5)
SODIUM BLD-SCNC: 140 MMOL/L (ref 132–146)
TOTAL PROTEIN: 5.6 G/DL (ref 6.4–8.3)
TSH SERPL DL<=0.05 MIU/L-ACNC: 0.68 UIU/ML (ref 0.27–4.2)
WBC # BLD: 8.5 E9/L (ref 4.5–11.5)

## 2019-04-27 PROCEDURE — 85025 COMPLETE CBC W/AUTO DIFF WBC: CPT

## 2019-04-27 PROCEDURE — 6360000002 HC RX W HCPCS: Performed by: INTERNAL MEDICINE

## 2019-04-27 PROCEDURE — 84100 ASSAY OF PHOSPHORUS: CPT

## 2019-04-27 PROCEDURE — 6370000000 HC RX 637 (ALT 250 FOR IP): Performed by: INTERNAL MEDICINE

## 2019-04-27 PROCEDURE — 36415 COLL VENOUS BLD VENIPUNCTURE: CPT

## 2019-04-27 PROCEDURE — 82962 GLUCOSE BLOOD TEST: CPT

## 2019-04-27 PROCEDURE — 99233 SBSQ HOSP IP/OBS HIGH 50: CPT | Performed by: INTERNAL MEDICINE

## 2019-04-27 PROCEDURE — 2060000000 HC ICU INTERMEDIATE R&B

## 2019-04-27 PROCEDURE — 97530 THERAPEUTIC ACTIVITIES: CPT | Performed by: PHYSICAL THERAPIST

## 2019-04-27 PROCEDURE — 80053 COMPREHEN METABOLIC PANEL: CPT

## 2019-04-27 PROCEDURE — 2580000003 HC RX 258: Performed by: NURSE PRACTITIONER

## 2019-04-27 PROCEDURE — 83735 ASSAY OF MAGNESIUM: CPT

## 2019-04-27 PROCEDURE — 2580000003 HC RX 258: Performed by: INTERNAL MEDICINE

## 2019-04-27 PROCEDURE — 84443 ASSAY THYROID STIM HORMONE: CPT

## 2019-04-27 RX ORDER — CETIRIZINE HYDROCHLORIDE 5 MG/1
5 TABLET ORAL DAILY
Status: DISCONTINUED | OUTPATIENT
Start: 2019-04-27 | End: 2019-04-30 | Stop reason: HOSPADM

## 2019-04-27 RX ORDER — POTASSIUM CHLORIDE 750 MG/1
10 TABLET, EXTENDED RELEASE ORAL ONCE
Status: COMPLETED | OUTPATIENT
Start: 2019-04-27 | End: 2019-04-27

## 2019-04-27 RX ORDER — GUAIFENESIN/DEXTROMETHORPHAN 100-10MG/5
5 SYRUP ORAL EVERY 4 HOURS PRN
Status: DISCONTINUED | OUTPATIENT
Start: 2019-04-27 | End: 2019-04-30 | Stop reason: HOSPADM

## 2019-04-27 RX ORDER — MECLIZINE HCL 12.5 MG/1
25 TABLET ORAL 3 TIMES DAILY
Status: DISCONTINUED | OUTPATIENT
Start: 2019-04-27 | End: 2019-04-30 | Stop reason: HOSPADM

## 2019-04-27 RX ADMIN — ENOXAPARIN SODIUM 60 MG: 60 INJECTION SUBCUTANEOUS at 08:45

## 2019-04-27 RX ADMIN — Medication 10 ML: at 08:45

## 2019-04-27 RX ADMIN — APIXABAN 2.5 MG: 2.5 TABLET, FILM COATED ORAL at 21:17

## 2019-04-27 RX ADMIN — MECLIZINE 12.5 MG: 12.5 TABLET ORAL at 11:18

## 2019-04-27 RX ADMIN — METOPROLOL TARTRATE 25 MG: 25 TABLET ORAL at 08:45

## 2019-04-27 RX ADMIN — GUAIFENESIN AND DEXTROMETHORPHAN 5 ML: 100; 10 SYRUP ORAL at 22:26

## 2019-04-27 RX ADMIN — DIGOXIN 125 MCG: 125 TABLET ORAL at 08:45

## 2019-04-27 RX ADMIN — GUAIFENESIN AND DEXTROMETHORPHAN 5 ML: 100; 10 SYRUP ORAL at 17:52

## 2019-04-27 RX ADMIN — GUAIFENESIN AND DEXTROMETHORPHAN 5 ML: 100; 10 SYRUP ORAL at 13:09

## 2019-04-27 RX ADMIN — SODIUM CHLORIDE: 9 INJECTION, SOLUTION INTRAVENOUS at 19:18

## 2019-04-27 RX ADMIN — POTASSIUM CHLORIDE 10 MEQ: 750 TABLET, EXTENDED RELEASE ORAL at 16:24

## 2019-04-27 RX ADMIN — MECLIZINE 25 MG: 12.5 TABLET ORAL at 21:17

## 2019-04-27 RX ADMIN — METOPROLOL TARTRATE 25 MG: 25 TABLET ORAL at 21:17

## 2019-04-27 RX ADMIN — MECLIZINE 25 MG: 12.5 TABLET ORAL at 16:24

## 2019-04-27 RX ADMIN — CETIRIZINE HYDROCHLORIDE 5 MG: 5 SOLUTION ORAL at 16:24

## 2019-04-27 RX ADMIN — MECLIZINE 12.5 MG: 12.5 TABLET ORAL at 06:42

## 2019-04-27 ASSESSMENT — PAIN SCALES - GENERAL
PAINLEVEL_OUTOF10: 0
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
## 2019-04-27 NOTE — PROGRESS NOTES
[address] [place], [place] [address]ist   Progress Note    Admitting Date and Time: 4/25/2019  8:20 PM  Admit Dx: Atrial fibrillation with RVR ([address] [I48.91]    Subjective: Patient feels she has a \"cold\" and requests medication for nonproductive cough and postnasal drip. She denies chest pain palpitations. She attempted to ambulate this a.m. and became very weak. Most these episodes occur while standing. Appetite only fair. Anxiety possibly better      ROS: denies fever, chills, cp, sob, n/v, HA unless stated above.      cetirizine HCl  5 mg Oral Daily    sodium chloride flush  10 mL Intravenous 2 times per day    metoprolol tartrate  25 mg Oral BID    enoxaparin  1 mg/kg Subcutaneous BID    digoxin  125 mcg Oral Daily       guaiFENesin-dextromethorphan 5 mL Q4H PRN   sodium chloride flush 10 mL PRN   magnesium hydroxide 30 mL Daily PRN   ondansetron 4 mg Q6H PRN   perflutren lipid microspheres 1.5 mL ONCE PRN   metoprolol 5 mg Q8H PRN   LORazepam 0.5 mg Q4H PRN   meclizine 12.5 mg TID PRN   glucose 15 g PRN   dextrose 12.5 g PRN   glucagon (rDNA) 1 mg PRN   dextrose 100 mL/hr PRN        Objective:    /74   Pulse 82   Temp 97.6 °F (36.4 °C) (Oral)   Resp 18   Ht 4' 11\" (1.499 m)   Wt 141 lb 7 oz (64.2 kg)   SpO2 96%   BMI 28.57 kg/m²   General Appearance: alert and oriented to person, place and time and in no acute distress  Skin: warm and dry  Head: normocephalic and atraumatic  Eyes: pupils equal, round, and reactive to light, extraocular eye movements intact, conjunctivae normal  Neck: neck supple and non tender without mass   Pulmonary/Chest: clear to auscultation bilaterally- no wheezes, rales or rhonchi, normal air movement, no respiratory distress  Cardiovascular: normal rate, normal S1 and S2 and no carotid bruits  Abdomen: soft, non-tender, non-distended, normal bowel sounds, no masses or organomegaly  Extremities: no cyanosis, no clubbing and no edema  Neurologic: no cranial nerve deficit and speech normal          Summary of echocardiogram April 26, 2019:   Mild left ventricular concentric hypertrophy noted.   Ejection fraction is visually estimated at 76%.   Overall ejection fraction is normal .   E/A flow reversal noted. Suggestive of diastolic dysfunction.   Physiologic and/or trace mitral regurgitation is present.   The aortic valve appears mildly sclerotic.   Mild aortic stenosis.   Physiologic and/or trace tricuspid regurgitation.   There is mild to moderate pulmonary hypertension.   A pericardial clear space is present suggesting a prominent pericardial   fat pad or hemodynamically insignificant pericardial effusion.   Dilated Inferior Vena Cava.       Recent Labs     04/25/19 2142 04/27/19  0530    140   K 3.8 4.1    111*   CO2 19* 20*   BUN 32* 28*   CREATININE 1.2* 1.0   GLUCOSE 190* 108*   CALCIUM 9.6 8.8       Recent Labs     04/25/19 2142 04/27/19  0530   WBC 15.5* 8.5   RBC 4.74 4.06   HGB 14.6 12.3   HCT 43.7 37.8   MCV 92.2 93.1   MCH 30.8 30.3   MCHC 33.4 32.5   RDW 13.6 13.9    184   MPV 10.1 10.1           Radiology:   XR CHEST STANDARD (2 VW)   Final Result   1. Subtle bibasilar linear atelectasis. Consult of Dr. Candice Valle reviewed in detail    Assessment:  Principal Problem:    Atrial fibrillation with RVR ([facility] [address]  Active Problems:    HTN (hypertension)    Cancer of endometrium ([facility] [address]    Gastroenteritis  Resolved Problems:    * No resolved hospital problems. *      Plan:  1. Atrial fibrillation with rapid ventricular response-this is been treated with IV Lopressor when necessary. Yesterday I added metoprolol tartrate 25 mg  twice a day in an attempt to control the rate. No evidence of CHF at the moment. Also yesterday Dr. Jacob Barone added digoxin 0.125 mg by mouth daily  continue Lovenox 1 mg/kg subcu every 12 hours at this time. Cardiologist is uncertain about value of anticoagulation.  I personally would suggest consideration for Eliquis sincere has been no definite recorded falls and her H&H is stable on physiologic doses of subcu Lovenox. For the moment moment I will continue simply with subcu Lovenox and increase the patient's activity. I will check orthostatic blood pressures since his symptoms occur while the patient standing. The echocardiogram is no specific need for further changes in therapy heart rate is slowing down nicely discharge in the a.m. seems to be reasonable if the patient's overall condition is stable. Outpatient follow-up with Dr. Kedar Calles might be considered at the sole discretion of the hospitalist  2. Vertigo-this is  suggestive of labyrinthitis. Continue Antivert 12 mg 3 times a day. 3. Anxiety disorder-we will continue Ativan half a milligram orally every 4 hours when necessary  4. Elevated blood sugar on admission-.diabetes mellitus since blood sugars are not coming down. Await hemoglobin A1c      5. NOTE: This report was transcribed using voice recognition software.  Every effort was made to ensure accuracy; however, inadvertent computerized transcription errors may be present.     Electronically signed by Michelle Tracey DO on 4/27/2019 at 2:06 PM

## 2019-04-27 NOTE — PLAN OF CARE
Problem: Falls - Risk of:  Goal: Will remain free from falls  Description  Will remain free from falls  4/27/2019 0113 by Pinky Garcia RN  Outcome: Met This Shift  4/26/2019 1231 by Funmilayo Yung RN  Outcome: Met This Shift  Goal: Absence of physical injury  Description  Absence of physical injury  4/27/2019 0113 by Pinky Garcia RN  Outcome: Met This Shift  4/26/2019 1231 by Fnumilayo Yung RN  Outcome: Met This Shift     Problem: Cardiac:  Goal: Hemodynamic stability will improve  Description  Hemodynamic stability will improve  Outcome: Met This Shift     Problem: Respiratory:  Goal: Respiratory status will improve  Description  Respiratory status will improve  Outcome: Met This Shift

## 2019-04-27 NOTE — PROGRESS NOTES
Notified Dr. Hira Foster of pt c/o increased dizziness with Ronney Dandy from sitting to standing. Also discussed pt request to shower, from a nursing standpoint I feel it is very unsafe for pt to shower. She I requesting to shower alone. Pt has has been unsteady and c/o dizziness/lightheadness all day. Trouble walking to restroom and standing alone. Front wheel walker placed in room. Educated on Falls/safety. Pt agreed to bath in chair at sink. Orders placed.

## 2019-04-27 NOTE — PROGRESS NOTES
Cardiology Progress Note  4/27/2019 5:22 PM    Symptoms: We had a long discussion and part of it was with a nurse present to go over our plan. I told her we would probably stay with the atrial fibrillation and eventually had oral anticoagulation. She is not interested in independent living at a skilled facility because she says she does have some help. She is working with a good ENT doctor to help with her ears as concerns hearing aids. Scheduled meds    cetirizine HCl  5 mg Oral Daily    meclizine  25 mg Oral TID    sodium chloride flush  10 mL Intravenous 2 times per day    metoprolol tartrate  25 mg Oral BID    enoxaparin  1 mg/kg Subcutaneous BID    digoxin  125 mcg Oral Daily        Continuous Infusions:   sodium chloride 50 mL/hr at 04/26/19 2007       Review of Systems: No chest pain. No palpitations. She still has lightheadedness and has to be careful when she walks. Objective:  Blood pressure (!) 142/91, pulse 87, temperature 97.6 °F (36.4 °C), temperature source Oral, resp. rate 18, height 4' 11\" (1.499 m), weight 141 lb 7 oz (64.2 kg), SpO2 96 %, not currently breastfeeding. Intake/Output Summary (Last 24 hours) at 4/27/2019 1722  Last data filed at 4/27/2019 1454  Gross per 24 hour   Intake 1730.03 ml   Output 400 ml   Net 1330.03 ml      Wt Readings from Last 3 Encounters:   04/27/19 141 lb 7 oz (64.2 kg)   04/25/19 142 lb (64.4 kg)   08/31/18 146 lb (66.2 kg)     No S3. Her ejection fraction was normal. She has a controlled ventricular rate with the atrial fibrillation.     CBC:   Lab Results   Component Value Date    WBC 8.5 04/27/2019    RBC 4.06 04/27/2019    HGB 12.3 04/27/2019    HCT 37.8 04/27/2019    MCV 93.1 04/27/2019    MCH 30.3 04/27/2019    MCHC 32.5 04/27/2019    RDW 13.9 04/27/2019     04/27/2019    MPV 10.1 04/27/2019     CMP:    Lab Results   Component Value Date     04/27/2019    K 4.1 04/27/2019     04/27/2019    CO2 20 04/27/2019    BUN 28 04/27/2019    CREATININE 1.0 04/27/2019    GFRAA >60 04/27/2019    LABGLOM 53 04/27/2019    GLUCOSE 108 04/27/2019    PROT 5.6 04/27/2019    LABALBU 3.4 04/27/2019    CALCIUM 8.8 04/27/2019    BILITOT 0.3 04/27/2019    ALKPHOS 80 04/27/2019    AST 22 04/27/2019    ALT 26 04/27/2019     PT/INR:    Lab Results   Component Value Date    PROTIME 11.7 04/25/2019    INR 1.0 04/25/2019       Assessment:  She definitely is better today than yesterday. May be the Antivert will help her. We will try to get some evaluation of the brain with an MRI to see the brain which would include the cerebellum or if she cannot stay still for that we'll do a CT of the head. Plan: Let's see how she does. I ordered some potassium today because it was a little bit low. I will check a dig level tomorrow. Doing immediate cardioversion would not be of benefit here in my opinion. It is likely she will go back into atrial fibrillation but that is something we can think about for the future. Let us try to get the lightheadedness cleared up before she goes home.         Completed By:  Inés Arredondo MD, [facility]  4/27/2019  5:22 PM

## 2019-04-27 NOTE — PROGRESS NOTES
Pt c/o dizziness/lightheaded and unsteady walking in room with front wheel walker and Nurse contact asst. Unable to ambulate in [place] this shift d/t safety concerns.

## 2019-04-28 ENCOUNTER — APPOINTMENT (OUTPATIENT)
Dept: CT IMAGING | Age: 79
DRG: 310 | End: 2019-04-28
Payer: MEDICARE

## 2019-04-28 LAB
ALBUMIN SERPL-MCNC: 2.9 G/DL (ref 3.5–5.2)
ALP BLD-CCNC: 82 U/L (ref 35–104)
ALT SERPL-CCNC: 53 U/L (ref 0–32)
ANION GAP SERPL CALCULATED.3IONS-SCNC: 7 MMOL/L (ref 7–16)
AST SERPL-CCNC: 38 U/L (ref 0–31)
BASOPHILS ABSOLUTE: 0.04 E9/L (ref 0–0.2)
BASOPHILS RELATIVE PERCENT: 0.5 % (ref 0–2)
BILIRUB SERPL-MCNC: 0.4 MG/DL (ref 0–1.2)
BUN BLDV-MCNC: 24 MG/DL (ref 8–23)
CALCIUM SERPL-MCNC: 8.5 MG/DL (ref 8.6–10.2)
CHLORIDE BLD-SCNC: 111 MMOL/L (ref 98–107)
CO2: 23 MMOL/L (ref 22–29)
CREAT SERPL-MCNC: 1 MG/DL (ref 0.5–1)
DIGOXIN LEVEL: <0.3 NG/ML (ref 0.8–2)
EKG ATRIAL RATE: 375 BPM
EKG Q-T INTERVAL: 336 MS
EKG QRS DURATION: 70 MS
EKG QTC CALCULATION (BAZETT): 435 MS
EKG R AXIS: -29 DEGREES
EKG T AXIS: 129 DEGREES
EKG VENTRICULAR RATE: 101 BPM
EOSINOPHILS ABSOLUTE: 0.12 E9/L (ref 0.05–0.5)
EOSINOPHILS RELATIVE PERCENT: 1.4 % (ref 0–6)
GFR AFRICAN AMERICAN: >60
GFR NON-AFRICAN AMERICAN: 53 ML/MIN/1.73
GLUCOSE BLD-MCNC: 91 MG/DL (ref 74–99)
HCT VFR BLD CALC: 36.7 % (ref 34–48)
HEMOGLOBIN: 12.1 G/DL (ref 11.5–15.5)
IMMATURE GRANULOCYTES #: 0.06 E9/L
IMMATURE GRANULOCYTES %: 0.7 % (ref 0–5)
LYMPHOCYTES ABSOLUTE: 1.24 E9/L (ref 1.5–4)
LYMPHOCYTES RELATIVE PERCENT: 14.5 % (ref 20–42)
MCH RBC QN AUTO: 30.9 PG (ref 26–35)
MCHC RBC AUTO-ENTMCNC: 33 % (ref 32–34.5)
MCV RBC AUTO: 93.9 FL (ref 80–99.9)
MONOCYTES ABSOLUTE: 0.55 E9/L (ref 0.1–0.95)
MONOCYTES RELATIVE PERCENT: 6.4 % (ref 2–12)
NEUTROPHILS ABSOLUTE: 6.57 E9/L (ref 1.8–7.3)
NEUTROPHILS RELATIVE PERCENT: 76.5 % (ref 43–80)
PDW BLD-RTO: 13.9 FL (ref 11.5–15)
PLATELET # BLD: 168 E9/L (ref 130–450)
PMV BLD AUTO: 10.2 FL (ref 7–12)
POTASSIUM SERPL-SCNC: 4 MMOL/L (ref 3.5–5)
RBC # BLD: 3.91 E12/L (ref 3.5–5.5)
SODIUM BLD-SCNC: 141 MMOL/L (ref 132–146)
TOTAL PROTEIN: 5 G/DL (ref 6.4–8.3)
WBC # BLD: 8.6 E9/L (ref 4.5–11.5)

## 2019-04-28 PROCEDURE — 2580000003 HC RX 258: Performed by: NURSE PRACTITIONER

## 2019-04-28 PROCEDURE — 99233 SBSQ HOSP IP/OBS HIGH 50: CPT | Performed by: INTERNAL MEDICINE

## 2019-04-28 PROCEDURE — 6370000000 HC RX 637 (ALT 250 FOR IP): Performed by: INTERNAL MEDICINE

## 2019-04-28 PROCEDURE — 2060000000 HC ICU INTERMEDIATE R&B

## 2019-04-28 PROCEDURE — 85025 COMPLETE CBC W/AUTO DIFF WBC: CPT

## 2019-04-28 PROCEDURE — 80053 COMPREHEN METABOLIC PANEL: CPT

## 2019-04-28 PROCEDURE — 93005 ELECTROCARDIOGRAM TRACING: CPT | Performed by: INTERNAL MEDICINE

## 2019-04-28 PROCEDURE — 2580000003 HC RX 258: Performed by: INTERNAL MEDICINE

## 2019-04-28 PROCEDURE — 80162 ASSAY OF DIGOXIN TOTAL: CPT

## 2019-04-28 PROCEDURE — 70450 CT HEAD/BRAIN W/O DYE: CPT

## 2019-04-28 PROCEDURE — 36415 COLL VENOUS BLD VENIPUNCTURE: CPT

## 2019-04-28 RX ADMIN — SODIUM CHLORIDE: 9 INJECTION, SOLUTION INTRAVENOUS at 15:04

## 2019-04-28 RX ADMIN — APIXABAN 2.5 MG: 2.5 TABLET, FILM COATED ORAL at 21:24

## 2019-04-28 RX ADMIN — GUAIFENESIN AND DEXTROMETHORPHAN 5 ML: 100; 10 SYRUP ORAL at 22:14

## 2019-04-28 RX ADMIN — METOPROLOL TARTRATE 25 MG: 25 TABLET ORAL at 21:23

## 2019-04-28 RX ADMIN — GUAIFENESIN AND DEXTROMETHORPHAN 5 ML: 100; 10 SYRUP ORAL at 06:07

## 2019-04-28 RX ADMIN — METOPROLOL TARTRATE 25 MG: 25 TABLET ORAL at 09:30

## 2019-04-28 RX ADMIN — MECLIZINE 25 MG: 12.5 TABLET ORAL at 09:30

## 2019-04-28 RX ADMIN — CETIRIZINE HYDROCHLORIDE 5 MG: 5 SOLUTION ORAL at 09:30

## 2019-04-28 RX ADMIN — MECLIZINE 25 MG: 12.5 TABLET ORAL at 21:23

## 2019-04-28 RX ADMIN — Medication 10 ML: at 09:30

## 2019-04-28 RX ADMIN — APIXABAN 2.5 MG: 2.5 TABLET, FILM COATED ORAL at 09:30

## 2019-04-28 RX ADMIN — DIGOXIN 125 MCG: 125 TABLET ORAL at 09:30

## 2019-04-28 RX ADMIN — MECLIZINE 25 MG: 12.5 TABLET ORAL at 15:37

## 2019-04-28 RX ADMIN — GUAIFENESIN AND DEXTROMETHORPHAN 5 ML: 100; 10 SYRUP ORAL at 15:37

## 2019-04-28 ASSESSMENT — PAIN SCALES - GENERAL
PAINLEVEL_OUTOF10: 0

## 2019-04-28 NOTE — PROGRESS NOTES
Notified Dr. Ramsey Garsia pt dig lvl <0.3, need for eliquis rx for case management. No new orders placed at this time.

## 2019-04-28 NOTE — PROGRESS NOTES
Cardiology Progress Note  4/28/2019 4:59 PM    Symptoms: She says she is at least 50% better. She still gets lightheaded spells. Scheduled meds    cetirizine HCl  5 mg Oral Daily    meclizine  25 mg Oral TID    apixaban  2.5 mg Oral BID    sodium chloride flush  10 mL Intravenous 2 times per day    metoprolol tartrate  25 mg Oral BID    digoxin  125 mcg Oral Daily        Continuous Infusions:   sodium chloride 50 mL/hr at 04/28/19 1504       Review of Systems: No angina. No syncope. No palpitations. No orthopnea. Objective:  Blood pressure 132/73, pulse 85, temperature 98.8 °F (37.1 °C), temperature source Oral, resp. rate 20, height 4' 11\" (1.499 m), weight 141 lb 7 oz (64.2 kg), SpO2 97 %, not currently breastfeeding. Intake/Output Summary (Last 24 hours) at 4/28/2019 1659  Last data filed at 4/28/2019 1557  Gross per 24 hour   Intake 1326 ml   Output 1500 ml   Net -174 ml      Wt Readings from Last 3 Encounters:   04/27/19 141 lb 7 oz (64.2 kg)   04/25/19 142 lb (64.4 kg)   08/31/18 146 lb (66.2 kg)     There is no S3. She still has the atrial fibrillation but the rate is controlled. CBC:   Lab Results   Component Value Date    WBC 8.6 04/28/2019    RBC 3.91 04/28/2019    HGB 12.1 04/28/2019    HCT 36.7 04/28/2019    MCV 93.9 04/28/2019    MCH 30.9 04/28/2019    MCHC 33.0 04/28/2019    RDW 13.9 04/28/2019     04/28/2019    MPV 10.2 04/28/2019     CMP:    Lab Results   Component Value Date     04/28/2019    K 4.0 04/28/2019     04/28/2019    CO2 23 04/28/2019    BUN 24 04/28/2019    CREATININE 1.0 04/28/2019    GFRAA >60 04/28/2019    LABGLOM 53 04/28/2019    GLUCOSE 91 04/28/2019    PROT 5.0 04/28/2019    LABALBU 2.9 04/28/2019    CALCIUM 8.5 04/28/2019    BILITOT 0.4 04/28/2019    ALKPHOS 82 04/28/2019    AST 38 04/28/2019    ALT 53 04/28/2019       Assessment:   I do not think the heart is the cause of her lightheadedness.  I believe that she had something that made her feel nauseated and sickly and that caused a lightheadedness or the labyrinthitis causing the lightheadedness and the nausea. Because she felt so sickly, I believe she developed atrial fibrillation due to her age. There is no angina but I do not why do a stress test at the present time. There is no heart failure. There is no ventricular tachycardia arrhythmia. There is no pericardial effusion or tamponade. Plan: Let's get the medication well enough that she has less lightheadedness. Consider support stockings. Have the  check out her insurance for one of the new anticoagulant pills which are called DOACs. Check out the liver function tests again.         Completed By:  Diana Marie MD, [facility]  4/28/2019  4:59 PM

## 2019-04-28 NOTE — PROGRESS NOTES
[address] [place], [place] [address]   Progress Note    Admitting Date and Time: 4/25/2019  8:20 PM  Admit Dx: Atrial fibrillation with RVR ([address] [I48.91]    Subjective:   Patient reports that she had 2 episodes of dizziness today. When seen she was sitting on the edge of the bed and feeling ok. Also reported 2 episodes of diarrhea. Per RN:patient was  orthostatic and also had 2 episodes of diarrhea. ROS: denies fever, chills, cp, sob, n/v, HA unless stated above.  cetirizine HCl  5 mg Oral Daily    meclizine  25 mg Oral TID    apixaban  2.5 mg Oral BID    sodium chloride flush  10 mL Intravenous 2 times per day    metoprolol tartrate  25 mg Oral BID    digoxin  125 mcg Oral Daily       guaiFENesin-dextromethorphan 5 mL Q4H PRN   sodium chloride flush 10 mL PRN   magnesium hydroxide 30 mL Daily PRN   ondansetron 4 mg Q6H PRN   perflutren lipid microspheres 1.5 mL ONCE PRN   metoprolol 5 mg Q8H PRN   LORazepam 0.5 mg Q4H PRN   glucose 15 g PRN   dextrose 12.5 g PRN   glucagon (rDNA) 1 mg PRN   dextrose 100 mL/hr PRN        Objective:    BP (!) 145/89   Pulse 93   Temp 97.6 °F (36.4 °C) (Oral)   Resp 20   Ht 4' 11\" (1.499 m)   Wt 141 lb 7 oz (64.2 kg)   SpO2 97%   BMI 28.57 kg/m²   General Appearance: alert and oriented to person, place and time and in no acute distress  Skin: warm and dry  Head: normocephalic and atraumatic  Eyes: pupils equal, round, and reactive to light, extraocular eye movements intact, conjunctivae normal  Neck: neck supple and non tender without mass   Pulmonary/Chest: clear to auscultation bilaterally- no wheezes, rales or rhonchi, normal air movement, no respiratory distress  Cardiovascular: S1 is variable. There is systolic murmur at LSB , no loud S3 or S4.   Abdomen: soft, non-tender, non-distended, normal bowel sounds, no masses or organomegaly  Extremities: no cyanosis, no clubbing and no edema  Neurologic: no cranial nerve deficit and speech normal          Summary of echocardiogram April 26, 2019:   Mild left ventricular concentric hypertrophy noted.   Ejection fraction is visually estimated at 76%.   Overall ejection fraction is normal .   E/A flow reversal noted. Suggestive of diastolic dysfunction.   Physiologic and/or trace mitral regurgitation is present.   The aortic valve appears mildly sclerotic.   Mild aortic stenosis.   Physiologic and/or trace tricuspid regurgitation.   There is mild to moderate pulmonary hypertension.   A pericardial clear space is present suggesting a prominent pericardial   fat pad or hemodynamically insignificant pericardial effusion.   Dilated Inferior Vena Cava.       Recent Labs     04/25/19 2142 04/27/19  0530 04/28/19  0557    140 141   K 3.8 4.1 4.0    111* 111*   CO2 19* 20* 23   BUN 32* 28* 24*   CREATININE 1.2* 1.0 1.0   GLUCOSE 190* 108* 91   CALCIUM 9.6 8.8 8.5*       Recent Labs     04/25/19 2142 04/27/19  0530 04/28/19  0557   WBC 15.5* 8.5 8.6   RBC 4.74 4.06 3.91   HGB 14.6 12.3 12.1   HCT 43.7 37.8 36.7   MCV 92.2 93.1 93.9   MCH 30.8 30.3 30.9   MCHC 33.4 32.5 33.0   RDW 13.6 13.9 13.9    184 168   MPV 10.1 10.1 10.2           Radiology:   XR CHEST STANDARD (2 VW)   Final Result   1. Subtle bibasilar linear atelectasis. CT HEAD WO CONTRAST    (Results Pending)     Consult notes reviewed     Assessment:  Principal Problem:    Atrial fibrillation with RVR ([facility]  Active Problems:    HTN (hypertension)    Cancer of endometrium ([facility] [address]    Gastroenteritis  Resolved Problems:    * No resolved hospital problems. *      Plan:  1. Atrial fibrillation with rapid ventricular response-this has been treated with IV Lopressor when necessary. metoprolol tartrate 25 mg  twice a day added on 04/26/2019 in an attempt to control the rate. No evidence of CHF at the moment. Also on 04/26/2019 Dr. Ellis Galo added digoxin 0.125 mg by mouth daily  continue Lovenox 1 mg/kg subcutaneous  every 12 hours at this time.  Cardiologist

## 2019-04-28 NOTE — PROGRESS NOTES
Notified Dr. Geovany Gilmore of pt increase in diarrhea frequency and Positive orho through night. Order placed for Cdiff stool testing.

## 2019-04-28 NOTE — PLAN OF CARE
Problem: Falls - Risk of:  Goal: Will remain free from falls  Description  Will remain free from falls  4/28/2019 0942 by Yulissa Meehan RN  Outcome: Met This Shift  4/28/2019 0027 by Gini Landon RN  Outcome: Met This Shift  Goal: Absence of physical injury  Description  Absence of physical injury  4/28/2019 0027 by Gini Landon RN  Outcome: Met This Shift     Problem: Cardiac:  Goal: Hemodynamic stability will improve  Description  Hemodynamic stability will improve  4/28/2019 0942 by Yulissa Meehan RN  Outcome: Met This Shift  4/28/2019 0027 by Gini Landon RN  Outcome: Met This Shift     Problem: Respiratory:  Goal: Respiratory status will improve  Description  Respiratory status will improve  4/28/2019 0942 by Yulissa Meehan RN  Outcome: Met This Shift  4/28/2019 0027 by Gini Landon RN  Outcome: Met This Shift

## 2019-04-28 NOTE — PLAN OF CARE
Problem: Falls - Risk of:  Goal: Will remain free from falls  Description  Will remain free from falls  Outcome: Met This Shift  Goal: Absence of physical injury  Description  Absence of physical injury  Outcome: Met This Shift     Problem: Cardiac:  Goal: Hemodynamic stability will improve  Description  Hemodynamic stability will improve  Outcome: Met This Shift     Problem: Respiratory:  Goal: Respiratory status will improve  Description  Respiratory status will improve  Outcome: Met This Shift

## 2019-04-29 LAB
ALBUMIN SERPL-MCNC: 3.1 G/DL (ref 3.5–5.2)
ALP BLD-CCNC: 109 U/L (ref 35–104)
ALT SERPL-CCNC: 97 U/L (ref 0–32)
ANION GAP SERPL CALCULATED.3IONS-SCNC: 10 MMOL/L (ref 7–16)
AST SERPL-CCNC: 57 U/L (ref 0–31)
BASOPHILS ABSOLUTE: 0.03 E9/L (ref 0–0.2)
BASOPHILS RELATIVE PERCENT: 0.3 % (ref 0–2)
BILIRUB SERPL-MCNC: 0.4 MG/DL (ref 0–1.2)
BUN BLDV-MCNC: 17 MG/DL (ref 8–23)
CALCIUM SERPL-MCNC: 8.6 MG/DL (ref 8.6–10.2)
CHLORIDE BLD-SCNC: 108 MMOL/L (ref 98–107)
CO2: 21 MMOL/L (ref 22–29)
CREAT SERPL-MCNC: 0.9 MG/DL (ref 0.5–1)
EOSINOPHILS ABSOLUTE: 0.19 E9/L (ref 0.05–0.5)
EOSINOPHILS RELATIVE PERCENT: 2.2 % (ref 0–6)
GFR AFRICAN AMERICAN: >60
GFR NON-AFRICAN AMERICAN: >60 ML/MIN/1.73
GLUCOSE BLD-MCNC: 102 MG/DL (ref 74–99)
HCT VFR BLD CALC: 38.7 % (ref 34–48)
HEMOGLOBIN: 12.8 G/DL (ref 11.5–15.5)
IMMATURE GRANULOCYTES #: 0.06 E9/L
IMMATURE GRANULOCYTES %: 0.7 % (ref 0–5)
LYMPHOCYTES ABSOLUTE: 1.51 E9/L (ref 1.5–4)
LYMPHOCYTES RELATIVE PERCENT: 17.4 % (ref 20–42)
MCH RBC QN AUTO: 30.8 PG (ref 26–35)
MCHC RBC AUTO-ENTMCNC: 33.1 % (ref 32–34.5)
MCV RBC AUTO: 93.3 FL (ref 80–99.9)
MONOCYTES ABSOLUTE: 0.64 E9/L (ref 0.1–0.95)
MONOCYTES RELATIVE PERCENT: 7.4 % (ref 2–12)
NEUTROPHILS ABSOLUTE: 6.25 E9/L (ref 1.8–7.3)
NEUTROPHILS RELATIVE PERCENT: 72 % (ref 43–80)
PDW BLD-RTO: 13.7 FL (ref 11.5–15)
PLATELET # BLD: 176 E9/L (ref 130–450)
PMV BLD AUTO: 10 FL (ref 7–12)
POTASSIUM SERPL-SCNC: 3.9 MMOL/L (ref 3.5–5)
RBC # BLD: 4.15 E12/L (ref 3.5–5.5)
SODIUM BLD-SCNC: 139 MMOL/L (ref 132–146)
TOTAL PROTEIN: 5.2 G/DL (ref 6.4–8.3)
WBC # BLD: 8.7 E9/L (ref 4.5–11.5)

## 2019-04-29 PROCEDURE — 2060000000 HC ICU INTERMEDIATE R&B

## 2019-04-29 PROCEDURE — 97530 THERAPEUTIC ACTIVITIES: CPT

## 2019-04-29 PROCEDURE — 6370000000 HC RX 637 (ALT 250 FOR IP): Performed by: HOSPITALIST

## 2019-04-29 PROCEDURE — 6370000000 HC RX 637 (ALT 250 FOR IP): Performed by: NURSE PRACTITIONER

## 2019-04-29 PROCEDURE — 2580000003 HC RX 258: Performed by: INTERNAL MEDICINE

## 2019-04-29 PROCEDURE — 99233 SBSQ HOSP IP/OBS HIGH 50: CPT | Performed by: HOSPITALIST

## 2019-04-29 PROCEDURE — APPSS30 APP SPLIT SHARED TIME 16-30 MINUTES: Performed by: NURSE PRACTITIONER

## 2019-04-29 PROCEDURE — 80053 COMPREHEN METABOLIC PANEL: CPT

## 2019-04-29 PROCEDURE — 6370000000 HC RX 637 (ALT 250 FOR IP): Performed by: INTERNAL MEDICINE

## 2019-04-29 PROCEDURE — 80074 ACUTE HEPATITIS PANEL: CPT

## 2019-04-29 PROCEDURE — 2580000003 HC RX 258: Performed by: NURSE PRACTITIONER

## 2019-04-29 PROCEDURE — 36415 COLL VENOUS BLD VENIPUNCTURE: CPT

## 2019-04-29 PROCEDURE — 85025 COMPLETE CBC W/AUTO DIFF WBC: CPT

## 2019-04-29 RX ORDER — LACTOBACILLUS RHAMNOSUS GG 10B CELL
1 CAPSULE ORAL DAILY
Status: DISCONTINUED | OUTPATIENT
Start: 2019-04-29 | End: 2019-04-30 | Stop reason: HOSPADM

## 2019-04-29 RX ORDER — LORAZEPAM 0.5 MG/1
0.5 TABLET ORAL EVERY 12 HOURS PRN
Status: DISCONTINUED | OUTPATIENT
Start: 2019-04-29 | End: 2019-04-30 | Stop reason: HOSPADM

## 2019-04-29 RX ADMIN — APIXABAN 2.5 MG: 2.5 TABLET, FILM COATED ORAL at 09:01

## 2019-04-29 RX ADMIN — GUAIFENESIN AND DEXTROMETHORPHAN 5 ML: 100; 10 SYRUP ORAL at 05:08

## 2019-04-29 RX ADMIN — DIGOXIN 125 MCG: 125 TABLET ORAL at 09:01

## 2019-04-29 RX ADMIN — METOPROLOL TARTRATE 25 MG: 25 TABLET ORAL at 09:01

## 2019-04-29 RX ADMIN — GUAIFENESIN AND DEXTROMETHORPHAN 5 ML: 100; 10 SYRUP ORAL at 20:02

## 2019-04-29 RX ADMIN — Medication 10 ML: at 20:02

## 2019-04-29 RX ADMIN — MECLIZINE 25 MG: 12.5 TABLET ORAL at 20:01

## 2019-04-29 RX ADMIN — APIXABAN 5 MG: 5 TABLET, FILM COATED ORAL at 20:01

## 2019-04-29 RX ADMIN — Medication 1 CAPSULE: at 12:05

## 2019-04-29 RX ADMIN — CETIRIZINE HYDROCHLORIDE 5 MG: 5 SOLUTION ORAL at 09:01

## 2019-04-29 RX ADMIN — MECLIZINE 25 MG: 12.5 TABLET ORAL at 15:30

## 2019-04-29 RX ADMIN — SODIUM CHLORIDE: 9 INJECTION, SOLUTION INTRAVENOUS at 10:20

## 2019-04-29 RX ADMIN — MECLIZINE 25 MG: 12.5 TABLET ORAL at 09:01

## 2019-04-29 RX ADMIN — Medication 10 ML: at 09:02

## 2019-04-29 ASSESSMENT — PAIN SCALES - GENERAL
PAINLEVEL_OUTOF10: 0

## 2019-04-29 NOTE — CARE COORDINATION
SOCIAL WORK / DISCHARGE PLANNING:  Sw met with pt at bedside to discuss transition to care. Pt reports to reside alone, although has dme in states she never uses it. Denies any dc needs. Hx [place] [facility] [facility] in 2016. Dc plan to return home as previous and states she will have ride. Pt reports to have prescription coverage although admits to not taking meds often. Pharmacy choice; [address] in [place]. CM to assist in checking Elquis coverage.  [place] will follow, assist prn                      Electronically signed by DELIA Rodrigues on 4/29/2019 at 1:57 PM

## 2019-04-29 NOTE — CARE COORDINATION
4/29/19: Dr. Zeinab Streeter provided script for Eliquis for homegoing to check on co-pay cost. Talked with patient who confirmed she goes to [email or web address]. Script faxed to [facility]. Per Sonya Yeager the pharmacist - the co-pay cost is $47.00 and they have the medication in stock. Talked to Mrs Farzaneh Armijo and she was agreeable to the co-pay cost.  Per department protocol free 30 day trial card for Eliquis  explained and given to patient. Patient instructed to take this card with  prescription to retail pharmacy to obtain 30 day supply for free. Instructed patient to follow-up with PCP within 1-2 weeks in order to obtain subsequent prescription for this medication at which time PCP will complete prior authorization if required. Patient verbalized understanding.  Dr. Markel Falcon called and CM informed him that Dr. Zeinab Streeter wrote the script for Eliquis to check the co-pay cost. Mike Jean RN

## 2019-04-29 NOTE — PROGRESS NOTES
Cardiology Progress Note  4/29/2019 4:29 PM    Symptoms: I seen her walking. She needed a walker. She is not stable. I spoke to her primary doctor who seems very well versed in her health situation. He said she never had lightheadedness and he recently saw her. He said he did not see atrial fibrillation ever. She recently had blood tests by him and they were not abnormal.       Scheduled meds    apixaban  5 mg Oral BID    lactobacillus  1 capsule Oral Daily    cetirizine HCl  5 mg Oral Daily    meclizine  25 mg Oral TID    sodium chloride flush  10 mL Intravenous 2 times per day    digoxin  125 mcg Oral Daily        Continuous Infusions: None. Review of Systems: No angina. No palpitations. No orthopnea. Objective:  Blood pressure 119/61, pulse 84, temperature 98.6 °F (37 °C), temperature source Oral, resp. rate 20, height 4' 11\" (1.499 m), weight 141 lb 7 oz (64.2 kg), SpO2 93 %, not currently breastfeeding. Intake/Output Summary (Last 24 hours) at 4/29/2019 1629  Last data filed at 4/29/2019 1158  Gross per 24 hour   Intake 1023.83 ml   Output 1850 ml   Net -826.17 ml      Wt Readings from Last 3 Encounters:   04/27/19 141 lb 7 oz (64.2 kg)   04/25/19 142 lb (64.4 kg)   08/31/18 146 lb (66.2 kg)     No S3. Normal ejection fraction. No rales.     CBC:   Lab Results   Component Value Date    WBC 8.7 04/29/2019    RBC 4.15 04/29/2019    HGB 12.8 04/29/2019    HCT 38.7 04/29/2019    MCV 93.3 04/29/2019    MCH 30.8 04/29/2019    MCHC 33.1 04/29/2019    RDW 13.7 04/29/2019     04/29/2019    MPV 10.0 04/29/2019     CMP:    Lab Results   Component Value Date     04/29/2019    K 3.9 04/29/2019     04/29/2019    CO2 21 04/29/2019    BUN 17 04/29/2019    CREATININE 0.9 04/29/2019    GFRAA >60 04/29/2019    LABGLOM >60 04/29/2019    GLUCOSE 102 04/29/2019    PROT 5.2 04/29/2019    LABALBU 3.1 04/29/2019    CALCIUM 8.6 04/29/2019    BILITOT 0.4 04/29/2019    ALKPHOS 109 04/29/2019    AST 57 04/29/2019    ALT 97 04/29/2019       Assessment:  There are many people who have atrial fibrillation in the hospital who are patients and some are workers. They do not have lightheadedness. Her heart is not the cause of her symptoms or her problems. She has 4 problems. One is hard of hearing. That has nothing to do with the heart. The other is lightheadedness. That is not due to the heart. The 3rd is the atrial fibrillation. The rate is not out-of-control and a CTA of the head did not show a stroke. However, an MRI does checked the cerebellum and that was not done yet. The 4th problem is the elevated liver function tests. The possibility of a viral syndrome seems plausible but that still is unusual I believe. Pharmacy thinks the anticoagulant oral he did it but she only had one pill. Metoprolol is metabolized in the liver but that was only a few days. I do not think the issues are settled and I do not think she can live at home with this problem. I believe the atrial fibrillation is from aging, history of high blood pressure in the past, and emotional stress. She had been on amlodipine for blood pressure. Plan: I will contact the ear nose and throat doctor that saw her before for hearing problems and hearing aids. I'm thinking we might want to consider a neurologist. This woman does have a lot of stress and no immediate relatives. Her presentation with the lightheadedness is definitely a difficult problem to solve.         Completed By:  Taran Cox MD, [facility] [place]  4/29/2019  4:29 PM

## 2019-04-29 NOTE — PLAN OF CARE
Problem: Falls - Risk of:  Goal: Will remain free from falls  Description  Will remain free from falls  4/29/2019 1047 by Marcy Oden RN  Outcome: Met This Shift     Problem: Falls - Risk of:  Goal: Absence of physical injury  Description  Absence of physical injury  4/29/2019 1047 by Marcy Oden RN  Outcome: Met This Shift     Problem: Cardiac:  Goal: Hemodynamic stability will improve  Description  Hemodynamic stability will improve  4/29/2019 1047 by Marcy Oden RN  Outcome: Met This Shift       Problem: Respiratory:  Goal: Respiratory status will improve  Description  Respiratory status will improve  4/29/2019 1047 by Marcy Oden RN  Outcome: Met This Shift

## 2019-04-29 NOTE — PROGRESS NOTES
[address] [place], [place] [address]   Progress Note    Admitting Date and Time: 4/25/2019  8:20 PM  Admit Dx: Atrial fibrillation with RVR ([address] [I48.91]    Subjective:     seen and examined  Reports dizziness when standing and when lying down. Does not feel she is ready to go home. Reports nausea, no vomiting. Is tolerating her diet. ROS: denies fever, chills, cp, sob,  HA unless stated above.      cetirizine HCl  5 mg Oral Daily    meclizine  25 mg Oral TID    apixaban  2.5 mg Oral BID    sodium chloride flush  10 mL Intravenous 2 times per day    metoprolol tartrate  25 mg Oral BID    digoxin  125 mcg Oral Daily       guaiFENesin-dextromethorphan 5 mL Q4H PRN   sodium chloride flush 10 mL PRN   magnesium hydroxide 30 mL Daily PRN   ondansetron 4 mg Q6H PRN   perflutren lipid microspheres 1.5 mL ONCE PRN   metoprolol 5 mg Q8H PRN   LORazepam 0.5 mg Q4H PRN   glucose 15 g PRN   dextrose 12.5 g PRN   glucagon (rDNA) 1 mg PRN   dextrose 100 mL/hr PRN        Objective:    /74   Pulse 91   Temp 98.6 °F (37 °C) (Oral)   Resp 20   Ht 4' 11\" (1.499 m)   Wt 141 lb 7 oz (64.2 kg)   SpO2 93%   BMI 28.57 kg/m²   General Appearance: alert and oriented to person, place and time and in no acute distress  Skin: warm and dry  Head: normocephalic and atraumatic  Eyes: pupils equal, round, and reactive to light, extraocular eye movements intact, conjunctivae normal  Neck: neck supple and non tender without mass   Pulmonary/Chest: clear to auscultation bilaterally- no wheezes, rales or rhonchi, normal air movement, no respiratory distress  Cardiovascular: normal rate, normal S1 and S2 and no carotid bruits  Abdomen: soft, non-tender, non-distended, normal bowel sounds, no masses or organomegaly  Extremities: no cyanosis, no clubbing and no edema  Neurologic: no cranial nerve deficit and speech normal      Recent Labs     04/27/19  0530 04/28/19  0557 04/29/19  0515    141 139   K 4.1 4.0 3.9
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
## 2019-04-30 VITALS
HEART RATE: 99 BPM | WEIGHT: 143.4 LBS | SYSTOLIC BLOOD PRESSURE: 110 MMHG | BODY MASS INDEX: 28.91 KG/M2 | DIASTOLIC BLOOD PRESSURE: 84 MMHG | RESPIRATION RATE: 20 BRPM | OXYGEN SATURATION: 97 % | HEIGHT: 59 IN | TEMPERATURE: 98 F

## 2019-04-30 LAB
ALBUMIN SERPL-MCNC: 3.1 G/DL (ref 3.5–5.2)
ALP BLD-CCNC: 99 U/L (ref 35–104)
ALT SERPL-CCNC: 80 U/L (ref 0–32)
ANION GAP SERPL CALCULATED.3IONS-SCNC: 10 MMOL/L (ref 7–16)
AST SERPL-CCNC: 33 U/L (ref 0–31)
BASOPHILS ABSOLUTE: 0.03 E9/L (ref 0–0.2)
BASOPHILS RELATIVE PERCENT: 0.4 % (ref 0–2)
BILIRUB SERPL-MCNC: 0.3 MG/DL (ref 0–1.2)
BUN BLDV-MCNC: 18 MG/DL (ref 8–23)
CALCIUM SERPL-MCNC: 8.6 MG/DL (ref 8.6–10.2)
CHLORIDE BLD-SCNC: 108 MMOL/L (ref 98–107)
CO2: 22 MMOL/L (ref 22–29)
CORTISOL TOTAL: 8.91 MCG/DL (ref 2.68–18.4)
CREAT SERPL-MCNC: 1 MG/DL (ref 0.5–1)
EOSINOPHILS ABSOLUTE: 0.26 E9/L (ref 0.05–0.5)
EOSINOPHILS RELATIVE PERCENT: 3.4 % (ref 0–6)
GFR AFRICAN AMERICAN: >60
GFR NON-AFRICAN AMERICAN: 53 ML/MIN/1.73
GLUCOSE BLD-MCNC: 108 MG/DL (ref 74–99)
HAV IGM SER IA-ACNC: NORMAL
HAV IGM SER IA-ACNC: NORMAL
HCT VFR BLD CALC: 37.5 % (ref 34–48)
HEMOGLOBIN: 12.2 G/DL (ref 11.5–15.5)
HEPATITIS B CORE IGM ANTIBODY: NORMAL
HEPATITIS B CORE IGM ANTIBODY: NORMAL
HEPATITIS B SURFACE ANTIGEN INTERPRETATION: NORMAL
HEPATITIS B SURFACE ANTIGEN INTERPRETATION: NORMAL
HEPATITIS C ANTIBODY INTERPRETATION: NORMAL
HEPATITIS C ANTIBODY INTERPRETATION: NORMAL
IMMATURE GRANULOCYTES #: 0.08 E9/L
IMMATURE GRANULOCYTES %: 1.1 % (ref 0–5)
LYMPHOCYTES ABSOLUTE: 1.28 E9/L (ref 1.5–4)
LYMPHOCYTES RELATIVE PERCENT: 16.9 % (ref 20–42)
MCH RBC QN AUTO: 30.4 PG (ref 26–35)
MCHC RBC AUTO-ENTMCNC: 32.5 % (ref 32–34.5)
MCV RBC AUTO: 93.5 FL (ref 80–99.9)
MONOCYTES ABSOLUTE: 0.56 E9/L (ref 0.1–0.95)
MONOCYTES RELATIVE PERCENT: 7.4 % (ref 2–12)
NEUTROPHILS ABSOLUTE: 5.35 E9/L (ref 1.8–7.3)
NEUTROPHILS RELATIVE PERCENT: 70.8 % (ref 43–80)
PDW BLD-RTO: 13.9 FL (ref 11.5–15)
PLATELET # BLD: 177 E9/L (ref 130–450)
PMV BLD AUTO: 10.3 FL (ref 7–12)
POTASSIUM SERPL-SCNC: 3.8 MMOL/L (ref 3.5–5)
RBC # BLD: 4.01 E12/L (ref 3.5–5.5)
SODIUM BLD-SCNC: 140 MMOL/L (ref 132–146)
TOTAL PROTEIN: 5.2 G/DL (ref 6.4–8.3)
WBC # BLD: 7.6 E9/L (ref 4.5–11.5)

## 2019-04-30 PROCEDURE — 6370000000 HC RX 637 (ALT 250 FOR IP): Performed by: HOSPITALIST

## 2019-04-30 PROCEDURE — 80053 COMPREHEN METABOLIC PANEL: CPT

## 2019-04-30 PROCEDURE — 97110 THERAPEUTIC EXERCISES: CPT

## 2019-04-30 PROCEDURE — 85025 COMPLETE CBC W/AUTO DIFF WBC: CPT

## 2019-04-30 PROCEDURE — 97116 GAIT TRAINING THERAPY: CPT

## 2019-04-30 PROCEDURE — 99238 HOSP IP/OBS DSCHRG MGMT 30/<: CPT | Performed by: HOSPITALIST

## 2019-04-30 PROCEDURE — 2580000003 HC RX 258: Performed by: NURSE PRACTITIONER

## 2019-04-30 PROCEDURE — 36415 COLL VENOUS BLD VENIPUNCTURE: CPT

## 2019-04-30 PROCEDURE — 82533 TOTAL CORTISOL: CPT

## 2019-04-30 PROCEDURE — APPSS45 APP SPLIT SHARED TIME 31-45 MINUTES: Performed by: NURSE PRACTITIONER

## 2019-04-30 PROCEDURE — 6370000000 HC RX 637 (ALT 250 FOR IP): Performed by: INTERNAL MEDICINE

## 2019-04-30 PROCEDURE — 6370000000 HC RX 637 (ALT 250 FOR IP): Performed by: NURSE PRACTITIONER

## 2019-04-30 PROCEDURE — 80074 ACUTE HEPATITIS PANEL: CPT

## 2019-04-30 RX ORDER — CETIRIZINE HYDROCHLORIDE 10 MG/1
10 TABLET ORAL DAILY
Qty: 30 TABLET | Refills: 0 | Status: SHIPPED | OUTPATIENT
Start: 2019-04-30 | End: 2019-05-30

## 2019-04-30 RX ORDER — MECLIZINE HYDROCHLORIDE 25 MG/1
25 TABLET ORAL 3 TIMES DAILY
Qty: 21 TABLET | Refills: 0 | Status: SHIPPED | OUTPATIENT
Start: 2019-04-30 | End: 2019-05-07

## 2019-04-30 RX ORDER — DIGOXIN 125 MCG
125 TABLET ORAL DAILY
Qty: 30 TABLET | Refills: 3 | Status: SHIPPED | OUTPATIENT
Start: 2019-05-01

## 2019-04-30 RX ADMIN — GUAIFENESIN AND DEXTROMETHORPHAN 5 ML: 100; 10 SYRUP ORAL at 09:01

## 2019-04-30 RX ADMIN — Medication 10 ML: at 08:54

## 2019-04-30 RX ADMIN — MECLIZINE 25 MG: 12.5 TABLET ORAL at 08:54

## 2019-04-30 RX ADMIN — APIXABAN 5 MG: 5 TABLET, FILM COATED ORAL at 08:54

## 2019-04-30 RX ADMIN — GUAIFENESIN AND DEXTROMETHORPHAN 5 ML: 100; 10 SYRUP ORAL at 00:39

## 2019-04-30 RX ADMIN — Medication 1 CAPSULE: at 08:54

## 2019-04-30 RX ADMIN — CETIRIZINE HYDROCHLORIDE 5 MG: 5 SOLUTION ORAL at 08:54

## 2019-04-30 RX ADMIN — DIGOXIN 125 MCG: 125 TABLET ORAL at 08:54

## 2019-04-30 RX ADMIN — MECLIZINE 25 MG: 12.5 TABLET ORAL at 14:33

## 2019-04-30 ASSESSMENT — PAIN SCALES - GENERAL: PAINLEVEL_OUTOF10: 0

## 2019-04-30 NOTE — PLAN OF CARE
Problem: Falls - Risk of:  Goal: Will remain free from falls  Description  Will remain free from falls  Outcome: Met This Shift  Note:   No falls  Goal: Absence of physical injury  Description  Absence of physical injury  Outcome: Met This Shift  Note:   No physical injury     Problem: Cardiac:  Goal: Hemodynamic stability will improve  Description  Hemodynamic stability will improve  Outcome: Met This Shift  Note:   Patient's hemodynamic stability improving     Problem: Respiratory:  Goal: Respiratory status will improve  Description  Respiratory status will improve  Outcome: Met This Shift  Note:   Respiratory status improving

## 2019-04-30 NOTE — PLAN OF CARE
Problem: Falls - Risk of:  Goal: Will remain free from falls  Description  Will remain free from falls  4/30/2019 1152 by Elroy Leventhal, RN  Outcome: Met This Shift     Problem: Falls - Risk of:  Goal: Absence of physical injury  Description  Absence of physical injury  4/30/2019 1152 by Elroy Leventhal, RN  Outcome: Met This Shift     Problem: Cardiac:  Goal: Hemodynamic stability will improve  Description  Hemodynamic stability will improve  4/30/2019 1152 by Elroy Leventhal, RN  Outcome: Met This Shift     Problem: Respiratory:  Goal: Respiratory status will improve  Description  Respiratory status will improve  4/30/2019 1152 by Elroy Leventhal, RN  Outcome: Met This Shift
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
## 2019-04-30 NOTE — PROGRESS NOTES
Patient's Orthostatic BP & HR     Laying BP: 122/79  Laying HR: 99    Sitting BP: 129/79  Sitting HR: 102    Standing BP: 158/78  Standing HR: 88
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
## 2019-04-30 NOTE — DISCHARGE SUMMARY
were obtained. FINDINGS:  SUPPORT DEVICES: None. Subtle linear atelectasis of both lung bases new since prior examination. No acute infiltrate or pleural effusion. Heart size is upper limits of normal. Spondylosis of the spine. . .     1. Subtle bibasilar linear atelectasis.          Patient Instructions:   Current Discharge Medication List      START taking these medications    Details   cetirizine (ZYRTEC) 10 MG tablet Take 1 tablet by mouth daily  Qty: 30 tablet, Refills: 0      digoxin (LANOXIN) 125 MCG tablet Take 1 tablet by mouth daily  Qty: 30 tablet, Refills: 3      meclizine (ANTIVERT) 25 MG tablet Take 1 tablet by mouth 3 times daily for 7 days  Qty: 21 tablet, Refills: 0      apixaban (ELIQUIS) 5 MG TABS tablet Take 1 tablet by mouth 2 times daily  Qty: 60 tablet, Refills: 1         CONTINUE these medications which have NOT CHANGED    Details   Multiple Vitamins-Minerals (RA VISION-MARIA ELENA PRESERVE PO) Take by mouth daily LD 7/12/16         STOP taking these medications       hydrochlorothiazide (HYDRODIURIL) 25 MG tablet Comments:   Reason for Stopping:         amLODIPine (NORVASC) 10 MG tablet Comments:   Reason for Stopping:         Cholecalciferol (VITAMIN D3) 53140 UNITS CAPS Comments:   Reason for Stopping:                 Note that more than 30 minutes was spent in preparing discharge papers, discussing discharge with patient, medication review, etc.      Signed:  Electronically signed by PRIYANKA Cai CNP on 4/30/2019 at 2:33 PM

## 2019-04-30 NOTE — PROGRESS NOTES
Cardiology Progress Note  4/30/2019 1:03 PM    Symptoms: She would like to go home but rehab might be better for her. She lives alone at home. Scheduled meds    apixaban  5 mg Oral BID    lactobacillus  1 capsule Oral Daily    cetirizine HCl  5 mg Oral Daily    meclizine  25 mg Oral TID    sodium chloride flush  10 mL Intravenous 2 times per day    digoxin  125 mcg Oral Daily         Objective:  Blood pressure 110/84, pulse 99, temperature 98 °F (36.7 °C), temperature source Oral, resp. rate 20, height 4' 11\" (1.499 m), weight 143 lb 6.4 oz (65 kg), SpO2 97 %, not currently breastfeeding. Intake/Output Summary (Last 24 hours) at 4/30/2019 1303  Last data filed at 4/30/2019 0618  Gross per 24 hour   Intake 300 ml   Output 1650 ml   Net -1350 ml      Wt Readings from Last 3 Encounters:   04/30/19 143 lb 6.4 oz (65 kg)   04/25/19 142 lb (64.4 kg)   08/31/18 146 lb (66.2 kg)     No S3. Has an occasional premature ventricular complex. Ventricular rate satisfactory. CBC:   Lab Results   Component Value Date    WBC 7.6 04/30/2019    RBC 4.01 04/30/2019    HGB 12.2 04/30/2019    HCT 37.5 04/30/2019    MCV 93.5 04/30/2019    MCH 30.4 04/30/2019    MCHC 32.5 04/30/2019    RDW 13.9 04/30/2019     04/30/2019    MPV 10.3 04/30/2019     CMP:    Lab Results   Component Value Date     04/30/2019    K 3.8 04/30/2019     04/30/2019    CO2 22 04/30/2019    BUN 18 04/30/2019    CREATININE 1.0 04/30/2019    GFRAA >60 04/30/2019    LABGLOM 53 04/30/2019    GLUCOSE 108 04/30/2019    PROT 5.2 04/30/2019    LABALBU 3.1 04/30/2019    CALCIUM 8.6 04/30/2019    BILITOT 0.3 04/30/2019    ALKPHOS 99 04/30/2019    AST 33 04/30/2019    ALT 80 04/30/2019       Assessment:   Although very rare, I think she has side effects to the metoprolol on the liver. Alternatively, it could've been a virus but I do not think so. She tolerates the digoxin and Eliquis. Plan: She will follow-up with me in about one month.  I

## 2019-05-01 ENCOUNTER — CARE COORDINATION (OUTPATIENT)
Dept: CASE MANAGEMENT | Age: 79
End: 2019-05-01

## 2019-05-01 NOTE — CARE COORDINATION
[facility] Transitions Initial Follow Up Call    Call within 2 business days of discharge: Yes    Patient: Corinne Teran Patient : 1940   MRN: [id number]  Reason for Admission: Atrial Fibrillation with RVR  Discharge Date: 19 RARS: Readmission Risk Score: 11      Last Discharge [facility]       Complaint Diagnosis Description Type Department Provider    19 Dizziness Atrial fibrillation with RVR ([facility] [address] . .. ED to Hosp-Admission (Discharged) (ADMITTED) [facility]S [address] [facility], DO; Hernando Quesada. .. 19 Nausea Nausea . .. UC (DISCHARGE) MARSHA BROWN           Attempted to contact patient today 19 for initial BPCI/hospital discharge follow up for Afib with RVR. Left message requesting a return call back to [facility] and provided contact information. CTC will continue with patient outreach. Follow Up  No future appointments.     PRIYANKA Garcia

## 2019-05-02 NOTE — CARE COORDINATION
[facility] Transitions Initial Follow Up Call    Call within 2 business days of discharge: Yes    Patient: Sheri Trevino Patient : 1940   MRN: [id number]  Reason for Admission: Atrial Fibrillation with RVR  Discharge Date: 19 RARS: Readmission Risk Score: 11      Last Discharge [facility]       Complaint Diagnosis Description Type Department Provider    19 Dizziness Atrial fibrillation with RVR ([facility] [address] . .. ED to Hosp-Admission (Discharged) (ADMITTED) [facility] 6S [address], DO; Gerry James. .. 19 Nausea Nausea . .. UC (DISCHARGE) [facility] STEPHANIE            Second attempt made to contact patient for initial BPCI/hospital discharge follow up for Afib with RVR. Patient LM for [facility] yesterday and provided number of [phone] which [facility] left message at this number requesting a return call. [facility] then called number on record [phone] and left message requesting a return call back to [facility] and provided contact information. Will hand off to central staff to follow for BPCI bundle. Follow Up  No future appointments.     Malathi Adame, APRN

## 2019-05-16 ENCOUNTER — CARE COORDINATION (OUTPATIENT)
Dept: CASE MANAGEMENT | Age: 79
End: 2019-05-16

## 2019-06-04 ENCOUNTER — CARE COORDINATION (OUTPATIENT)
Dept: CASE MANAGEMENT | Age: 79
End: 2019-06-04

## 2019-06-04 NOTE — CARE COORDINATION
[facility] Transitions Follow Up Call    2019    Patient: Shamika Gallegos  Patient : 1940   MRN: <[id number]>  Reason for Admission:   Discharge Date: 19 RARS: Readmission Risk Score: 11             Care Transitions Subsequent and Final Call    Subsequent and Final Calls  Care Transitions Interventions  Other Interventions: Follow Up : Unable to reach BPCI patient for follow up call. Left a voice message with my contact information for return call. Will re attempt at later time. No future appointments.     Marialuisa Laguerre RN

## 2019-07-05 ENCOUNTER — CARE COORDINATION (OUTPATIENT)
Dept: CASE MANAGEMENT | Age: 79
End: 2019-07-05

## 2019-07-29 ENCOUNTER — CARE COORDINATION (OUTPATIENT)
Dept: CASE MANAGEMENT | Age: 79
End: 2019-07-29

## 2020-02-07 ENCOUNTER — HOSPITAL ENCOUNTER (EMERGENCY)
Age: 80
Discharge: HOME | End: 2020-02-07
Attending: EMERGENCY MEDICINE
Payer: MEDICARE

## 2020-02-07 VITALS
BODY MASS INDEX: 26.84 KG/M2 | HEART RATE: 78 BPM | WEIGHT: 133.13 LBS | DIASTOLIC BLOOD PRESSURE: 99 MMHG | SYSTOLIC BLOOD PRESSURE: 182 MMHG | HEIGHT: 59 IN | RESPIRATION RATE: 21 BRPM | TEMPERATURE: 98 F | OXYGEN SATURATION: 94 %

## 2020-02-07 PROCEDURE — 99283 EMERGENCY DEPT VISIT LOW MDM: CPT

## 2020-02-07 NOTE — ED PROVIDER NOTES
-------------------------------------------------  All laboratory and radiology results have been personally reviewed by myself   LABS:  No results found for this visit on 02/07/20. RADIOLOGY:  Interpreted by Radiologist.  No orders to display       ------------------------- NURSING NOTES AND VITALS REVIEWED ---------------------------   The nursing notes within the ED encounter and vital signs as below have been reviewed. BP (!) 182/99   Pulse 78   Temp 98 °F (36.7 °C) (Oral)   Resp 21   Ht 4' 11\" (1.499 m)   Wt 133 lb 2 oz (60.4 kg)   SpO2 94%   BMI 26.89 kg/m²   Oxygen Saturation Interpretation: Normal      ---------------------------------------------------PHYSICAL EXAM--------------------------------------      Constitutional/General: Alert and oriented x3, well appearing, non toxic in NAD  Head: Normocephalic and atraumatic  Eyes: PERRL, EOMI  Mouth: Oropharynx clear, handling secretions,   Neck: Supple, full ROM,   Pulmonary: Lungs clear to auscultation bilaterally, no wheezes, rales, or rhonchi. Not in respiratory distress  Cardiovascular:  Regular rate and rhythm, no murmurs, gallops, or rubs. Abdomen: Soft, non tender, non distended,   Extremities: Moves all extremities x 4. Warm and well perfused  Skin: warm and dry without rash  Neurologic: GCS 15, nonfocal  Psych: Normal Affect      ------------------------------ ED COURSE/MEDICAL DECISION MAKING----------------------  Medications - No data to display      ED COURSE:       Medical Decision Making:    Asymptomatic high blood pressure. Compliant with her medicines, discussed the danger with her of taking extra medicines and encouraged her to not check her blood pressure too frequently but to follow-up with her doctor next week to reassess and discuss any change in her dosing. Counseling:    The emergency provider has spoken with the patient and discussed todays results, in addition to providing specific details for the plan of care and counseling regarding the diagnosis and prognosis. Questions are answered at this time and they are agreeable with the plan.      --------------------------------- IMPRESSION AND DISPOSITION ---------------------------------    IMPRESSION  1. Hypertension, unspecified type        DISPOSITION  Disposition: Discharge to home  Patient condition is stable      NOTE: This report was transcribed using voice recognition software.  Every effort was made to ensure accuracy; however, inadvertent computerized transcription errors may be present      Harjinder Esteban MD  02/07/20 2788

## 2020-02-20 ENCOUNTER — OFFICE VISIT (OUTPATIENT)
Dept: ORTHOPEDIC SURGERY | Age: 80
End: 2020-02-20
Payer: MEDICARE

## 2020-02-20 VITALS
HEIGHT: 59 IN | HEART RATE: 74 BPM | BODY MASS INDEX: 26.61 KG/M2 | SYSTOLIC BLOOD PRESSURE: 148 MMHG | DIASTOLIC BLOOD PRESSURE: 87 MMHG | TEMPERATURE: 97.2 F | WEIGHT: 132 LBS

## 2020-02-20 PROCEDURE — G8400 PT W/DXA NO RESULTS DOC: HCPCS | Performed by: ORTHOPAEDIC SURGERY

## 2020-02-20 PROCEDURE — G8417 CALC BMI ABV UP PARAM F/U: HCPCS | Performed by: ORTHOPAEDIC SURGERY

## 2020-02-20 PROCEDURE — 99214 OFFICE O/P EST MOD 30 MIN: CPT | Performed by: ORTHOPAEDIC SURGERY

## 2020-02-20 PROCEDURE — 1090F PRES/ABSN URINE INCON ASSESS: CPT | Performed by: ORTHOPAEDIC SURGERY

## 2020-02-20 PROCEDURE — G8427 DOCREV CUR MEDS BY ELIG CLIN: HCPCS | Performed by: ORTHOPAEDIC SURGERY

## 2020-02-20 PROCEDURE — G8484 FLU IMMUNIZE NO ADMIN: HCPCS | Performed by: ORTHOPAEDIC SURGERY

## 2020-02-20 PROCEDURE — 4040F PNEUMOC VAC/ADMIN/RCVD: CPT | Performed by: ORTHOPAEDIC SURGERY

## 2020-02-20 PROCEDURE — 1036F TOBACCO NON-USER: CPT | Performed by: ORTHOPAEDIC SURGERY

## 2020-02-20 PROCEDURE — 1123F ACP DISCUSS/DSCN MKR DOCD: CPT | Performed by: ORTHOPAEDIC SURGERY

## 2020-02-20 RX ORDER — AMLODIPINE BESYLATE 5 MG/1
TABLET ORAL
COMMUNITY
Start: 2020-01-23

## 2020-02-21 NOTE — PROGRESS NOTES
Chief Complaint:   Chief Complaint   Patient presents with    Shoulder Pain     Bilateral shoulder pain     Knee Pain     Rt knee pain        Lauren Poole presents of multiple joint complaints, primarily bilateral shoulders with chronic recurring refractory and insidious diffuse pain limiting her activities, pain is constant but aggravated with reaching or overhead. This is associated with increasing stiffness. Patient has been to physical therapy for this problem in the past with significant relief however she has admittedly been noncompliant with that. Questionable use of over-the-counter medications as well without much relief. Also complains of some intermittent nonspecific right knee pain anterior to lateral aspect no injury, no fever chills sweats or other joint complaints, bilateral total hips remain functional and asymptomatic at this time. Allergies; medications; past medical, surgical, family, and social history; and problem list have been reviewed today and updated as indicated in this encounter seen below. Exam: Lower extremity shows equal leg lengths full motion of both hips without pain left knee benign, right knee shows very mild anterolateral tenderness with minimal crepitus on full range of motion no laxity or effusion. Bilateral shoulder exam shows symmetric loss of active and passive range of motion limited to about 45 to 60 degrees of abduction and flexion with significant pain but no appreciable effusion or deformity. No motor or sensory deficits in the hands. Radiographs: Weightbearing x-rays of the knees today show very mild joint space narrowing minimal sclerosis no deformity or osteophytes consistent with mild DJD bilateral knees. AP lateral x-ray exams of bilateral shoulders today show complete loss of glenohumeral articular cartilage with significant sclerosis and osteophyte formation consistent with end-stage OA bilateral shoulders.     Caitlyn was seen today for shoulder

## 2020-08-02 ENCOUNTER — HOSPITAL ENCOUNTER (OUTPATIENT)
Dept: CT IMAGING | Age: 80
Discharge: HOME | End: 2020-08-04
Payer: MEDICARE

## 2020-08-02 PROCEDURE — 74176 CT ABD & PELVIS W/O CONTRAST: CPT

## 2020-08-02 PROCEDURE — 6360000004 HC RX CONTRAST MEDICATION: Performed by: RADIOLOGY

## 2020-08-02 RX ADMIN — IOHEXOL 50 ML: 240 INJECTION, SOLUTION INTRATHECAL; INTRAVASCULAR; INTRAVENOUS; ORAL at 14:53

## 2020-10-28 ENCOUNTER — HOSPITAL ENCOUNTER (EMERGENCY)
Age: 80
Discharge: HOME | End: 2020-10-28
Payer: MEDICARE

## 2020-10-28 VITALS
SYSTOLIC BLOOD PRESSURE: 134 MMHG | OXYGEN SATURATION: 96 % | WEIGHT: 133 LBS | TEMPERATURE: 97.8 F | BODY MASS INDEX: 26.86 KG/M2 | RESPIRATION RATE: 16 BRPM | DIASTOLIC BLOOD PRESSURE: 71 MMHG | HEART RATE: 82 BPM

## 2020-10-28 PROCEDURE — 6360000002 HC RX W HCPCS: Performed by: NURSE PRACTITIONER

## 2020-10-28 PROCEDURE — 99212 OFFICE O/P EST SF 10 MIN: CPT

## 2020-10-28 RX ORDER — CLINDAMYCIN HYDROCHLORIDE 300 MG/1
300 CAPSULE ORAL 3 TIMES DAILY
Qty: 15 CAPSULE | Refills: 0 | Status: SHIPPED | OUTPATIENT
Start: 2020-10-28 | End: 2020-11-02

## 2020-10-28 RX ORDER — CEPHALEXIN 500 MG/1
500 CAPSULE ORAL 3 TIMES DAILY
Qty: 21 CAPSULE | Refills: 0 | Status: SHIPPED | OUTPATIENT
Start: 2020-10-28 | End: 2020-10-28 | Stop reason: CLARIF

## 2020-10-28 RX ORDER — DEXAMETHASONE SODIUM PHOSPHATE 10 MG/ML
10 INJECTION, SOLUTION INTRAMUSCULAR; INTRAVENOUS ONCE
Status: COMPLETED | OUTPATIENT
Start: 2020-10-28 | End: 2020-10-28

## 2020-10-28 RX ADMIN — DEXAMETHASONE SODIUM PHOSPHATE 10 MG: 10 INJECTION, SOLUTION INTRAMUSCULAR; INTRAVENOUS at 13:12

## 2020-10-28 NOTE — ED PROVIDER NOTES
Department of Emergency Medicine   [facility]  Provider Note  Admit Date/RoomTime: 10/28/2020 12:48 PM  Room: 07/07    Chief Complaint   Joint Swelling (right wrist is red and swollen, cellulitis like, possible bite, warm, started yesterday)    History of Present Illness   Source of history provided by:  patient. History/Exam Limitations: none. Tatum Whyte is a 78 y.o. old female with has a past medical history of:   Past Medical History:   Diagnosis Date    Anxiety     no medications, becomes short of breath when upset/ anxious    Cancer ([address] 2013    uterine    DJD (degenerative joint disease) 06/02/2014    Rt. RICHARD 6/2/2014 ZANE Clayton MD    Hyperlipidemia     no meds    Hypertension     Irregular heart beat     1 episode while hospitalized for kidney stone    Osteoarthritis 07/18/2016    Lt. RICHARD 7/18/2016 ZANE Clayton MD    Preoperative clearance     Dr Honey Trevino, medical for right hip arthroplasty 6/14    Renal calculi     for or 8/5/2015    Ureteral perforation secondary to stent manipulation 5/8/2015    presents to the urgent care center by private vehicle, for complaint of a reddened swollen area on the inner aspect of her right forearm. She says that it started yesterday and the redness is spreading. She does not have fever. Did not get bit by anything that she knows of. She said that this is itchy. ROS    Pertinent positives and negatives are stated within HPI, all other systems reviewed and are negative. Past Surgical History:   Procedure Laterality Date    CHOLECYSTECTOMY      HYSTERECTOMY      LITHOTRIPSY Left 8/05/2015    LEFT RENAL CALCULI    OTHER SURGICAL HISTORY  05-08-15    Cystoscopy retrograde pyelogram    SKIN BIOPSY      skin cancer nose    TOTAL HIP ARTHROPLASTY Right 06/02/2014    right hip ZANE Clayton MD    TOTAL HIP ARTHROPLASTY Left 07/18/2016    left hip ZANE Clayton MD   Social History:  reports that she has never smoked.  She has never used smokeless tobacco. She reports current alcohol use. She reports that she does not use drugs. Family History: family history includes Heart Disease in her father; Kidney Disease in her mother. Allergies: Iodine; Other; Cefoxitin sodium in dextrose; Hycomine compound [pe-cpm-hydrocod-apap-caff]; Iodine; and Mefoxin [cefoxitin]    Physical Exam           ED Triage Vitals   BP Temp Temp src Pulse Resp SpO2 Height Weight   10/28/20 1249 10/28/20 1249 -- 10/28/20 1252 10/28/20 1252 10/28/20 1252 -- 10/28/20 1252   134/71 97.8 °F (36.6 °C)  82 16 96 %  133 lb (60.3 kg)     Oxygen Saturation Interpretation: Normal.    Constitutional:  Alert, development consistent with age. HEENT:  NC/NT. Airway patent. Eyes:  Clear conjunctiva, no discharge. Neck:  Supple. Respiratory:  Clear to auscultation and breath sounds equal.  CV:  Regular rate and rhythm. GI:  Abdomen Soft, nontender, +BS. Integument:  Skin turgor: Normal.              erythema measuring 3 cms cm by 3 cm, warmth, tenderness and swelling. Neurological:  Orientation age-appropriate unless noted elseware. Motor functions intact. Lab / Imaging Results   (All laboratory and radiology results have been personally reviewed by myself)  Labs:  No results found for this visit on 10/28/20. Imaging: All Radiology results interpreted by Radiologist unless otherwise noted. No orders to display       ED Course / Medical Decision Making     Medications   dexamethasone (PF) (DECADRON) injection 10 mg (10 mg Oral Given 10/28/20 1312)              MDM:   This appears that it could be a insect bite. She said that it is itchy and the redness is spreading. She was given decadron and started on clindamycin-- states that she has some allergies to medications like keflex. She was advised to see her Dr for recheck, get reevaluated if any worsening.      Counseling:    I have  spoken with the patient and discussed todays results, in addition to providing specific details for the plan of care and counseling regarding the diagnosis and prognosis. Questions are answered at this time and they are agreeable with the plan. Assessment      1. Cellulitis, unspecified cellulitis site      Plan   Discharge to home and advised to contact Anh Forde MD  [address] [facility]  [phone]      As needed   Patient condition is good    New Medications     New Prescriptions    CLINDAMYCIN (CLEOCIN) 300 MG CAPSULE    Take 1 capsule by mouth 3 times daily for 5 days If not covered by insurance or too costly for patient may substitute clindamycin  mg Caps, 2 caps TID ,QS for 10 days. Electronically signed by PRIYANKA Mohr Asa, CNP   DD: 10/28/20  **This report was transcribed using voice recognition software. Every effort was made to ensure accuracy; however, inadvertent computerized transcription errors may be present.   END OF ED PROVIDER NOTE       PRIYANKA Mohr Asa, CNP  10/28/20 0789

## 2020-11-20 ENCOUNTER — HOSPITAL ENCOUNTER (EMERGENCY)
Age: 80
Discharge: HOME | End: 2020-11-20
Payer: MEDICARE

## 2020-11-20 VITALS
SYSTOLIC BLOOD PRESSURE: 164 MMHG | RESPIRATION RATE: 20 BRPM | HEART RATE: 84 BPM | OXYGEN SATURATION: 96 % | TEMPERATURE: 98.8 F | DIASTOLIC BLOOD PRESSURE: 89 MMHG

## 2020-11-20 PROCEDURE — 69200 CLEAR OUTER EAR CANAL: CPT

## 2020-11-20 PROCEDURE — 69209 REMOVE IMPACTED EAR WAX UNI: CPT

## 2020-11-20 PROCEDURE — 99213 OFFICE O/P EST LOW 20 MIN: CPT

## 2020-11-20 RX ORDER — NEOMYCIN SULFATE, POLYMYXIN B SULFATE, HYDROCORTISONE 3.5; 10000; 1 MG/ML; [USP'U]/ML; MG/ML
2 SOLUTION/ DROPS AURICULAR (OTIC) 4 TIMES DAILY
Qty: 1 BOTTLE | Refills: 0 | Status: SHIPPED | OUTPATIENT
Start: 2020-11-20 | End: 2020-11-25

## 2020-11-20 NOTE — ED NOTES
Left ear flushed with warm water for small amount of Kleenex material. Tolerated procedure well.      Fredy Vasquez LPN  76/20/11 7613

## 2020-11-20 NOTE — ED PROVIDER NOTES
Department of Emergency Medicine   ED  Provider Note  Admit Date/RoomTime: 11/20/2020 10:04 AM  ED Room: 04/04   Chief Complaint:   Otalgia (left ear pain started yesterday  has had issues since using hearing aids )    History of Present Illness   Source of history provided by:  patient. History/Exam Limitations: none. Diana Leggett is a 78 y.o. old female with a past medical history of:   Past Medical History:   Diagnosis Date    Anxiety     no medications, becomes short of breath when upset/ anxious    Cancer ([address] 2013    uterine    DJD (degenerative joint disease) 06/02/2014    Rt. RICHARD 6/2/2014 ZANE Clayton MD    Hyperlipidemia     no meds    Hypertension     Irregular heart beat     1 episode while hospitalized for kidney stone    Osteoarthritis 07/18/2016    Lt. RICHARD 7/18/2016 ZANE Clayton MD    Preoperative clearance     Dr Leonarda Yoo, medical for right hip arthroplasty 6/14    Renal calculi     for or 8/5/2015    Ureteral perforation secondary to stent manipulation 5/8/2015   ,presenting to the emergency department with complaint of left-sided ear pain. Patient states she has had pain in her left ear now since yesterday. She denies any right ear pain. Denies sore throat. Denies any difficulty swallowing. Denies any cough or congestion. After examination and seeing something white in her ear I did question her further. Ends up that she sticks tissues in her ears before going to the hairdresser, which she did yesterday. She does not like water getting in her ears when they wash her hair. Patient states she has not been wearing her hearing aids as that makes it worse. ROS    Pertinent positives and negatives are stated within HPI, all other systems reviewed and are negative. Past Surgical History:  has a past surgical history that includes Cholecystectomy; Hysterectomy; skin biopsy; other surgical history (05-08-15); Lithotripsy (Left, 8/05/2015);  Total hip arthroplasty (Right, 06/02/2014); and Total hip arthroplasty (Left, 07/18/2016). Social History:  reports that she has never smoked. She has never used smokeless tobacco. She reports current alcohol use. She reports that she does not use drugs. Family History: family history includes Heart Disease in her father; Kidney Disease in her mother. Allergies: Iodine; Other; Cefoxitin sodium in dextrose; Hycomine compound [pe-cpm-hydrocod-apap-caff]; Iodine; and Mefoxin [cefoxitin]    Physical Exam           ED Triage Vitals   BP Temp Temp Source Pulse Resp SpO2 Height Weight   11/20/20 1011 11/20/20 1006 11/20/20 1006 11/20/20 1006 11/20/20 1006 11/20/20 1006 -- --   (!) 164/89 97 °F (36.1 °C) Infrared 84 20 96 %        Oxygen Saturation Interpretation: Normal.    General:  NAD. Alert and Oriented. Well-appearing. Skin:  Warm, dry. No rashes. Head:  Normocephalic. Atraumatic. Eyes:  EOMI. Conjunctiva normal.  ENT:  Oral mucosa moist.  Airway patent. Posterior pharynx without erythema, without swelling. Left TM without erythema, without bulging. There is a white fluffy-like substance in her left ear canal.  Right TM without erythema, without bulging. Right TM with minimal light brown wax. Neck:  Supple. Normal ROM. Respiratory:  No respiratory distress. No labored breathing. Lungs clear without rales, rhonchi or wheezing. Cardiovascular:  Regular rate. No Murmur. No peripheral edema. Extremities warm and good color. Extremities:  Normal ROM. Nontender to palpation. Atraumatic. Back:  Normal ROM. Nontender to palpation. Neuro:  Alert and Oriented to person, place, time and situation. Normal LOC. Moves all extremities. Speech fluent. Psych:  Calm and Cooperative. Normal thought process. Normal judgement. Lab / Imaging Results   (All laboratory and radiology results have been personally reviewed by myself)  Labs:  No results found for this visit on 11/20/20. Imaging:   All Radiology results interpreted by Radiologist unless otherwise noted. No orders to display     ED Course / Medical Decision Making   Medications - No data to display     Re-examination:  11/20/20       Time: 1035   I did use an ear loop and manually remove the remainder of white friable substance from her left ear canal.  She had patient does admit her left ear does feel better. Consult(s):   None    Procedure(s):   none    MDM:   Patient to be given eardrops secondary to redness in the ear canal which is possibly just the irritation from flushing and digging at it. Counseling: The emergency provider has spoken with the patient and discussed todays results, in addition to providing specific details for the plan of care and counseling regarding the diagnosis and prognosis. Questions are answered at this time and they are agreeable with the plan. Assessment      1. Otalgia of left ear New Problem   2. Foreign body of left ear, initial encounter New Problem     Plan   Discharge to home  Patient condition is good    New Medications     New Prescriptions    NEOMYCIN-POLYMYXIN-HYDROCORTISONE 1 % SOLN OTIC SOLUTION    Place 2 drops into the left ear 4 times daily for 5 days     Electronically signed by TISHA Llanes   DD: 11/20/20  **This report was transcribed using voice recognition software. Every effort was made to ensure accuracy; however, inadvertent computerized transcription errors may be present.   END OF ED PROVIDER NOTE       Elvis Llanes  11/20/20 1030

## 2020-11-22 ENCOUNTER — HOSPITAL ENCOUNTER (EMERGENCY)
Age: 80
Discharge: HOME | End: 2020-11-22
Payer: MEDICARE

## 2020-11-22 VITALS
SYSTOLIC BLOOD PRESSURE: 135 MMHG | BODY MASS INDEX: 26.86 KG/M2 | HEART RATE: 65 BPM | RESPIRATION RATE: 18 BRPM | WEIGHT: 133 LBS | DIASTOLIC BLOOD PRESSURE: 60 MMHG | TEMPERATURE: 97.7 F | OXYGEN SATURATION: 96 %

## 2020-11-22 PROCEDURE — 99212 OFFICE O/P EST SF 10 MIN: CPT

## 2020-11-22 RX ORDER — AMOXICILLIN AND CLAVULANATE POTASSIUM 875; 125 MG/1; MG/1
1 TABLET, FILM COATED ORAL 2 TIMES DAILY
Qty: 20 TABLET | Refills: 0 | Status: SHIPPED | OUTPATIENT
Start: 2020-11-22 | End: 2020-12-02

## 2020-11-22 NOTE — ED PROVIDER NOTES
Pulse Resp SpO2 Height Weight   11/22/20 0937 11/22/20 0936 11/22/20 0936 11/22/20 0936 11/22/20 0936 11/22/20 0936 -- 11/22/20 0936   135/60 97.7 °F (36.5 °C) Infrared 65 18 96 %  133 lb (60.3 kg)      Oxygen Saturation Interpretation: Normal.    Gen.: Vitals noted no distress. Afebrile. Normal phonation, no stridor, no trismus. ENT: Left TM is unremarkable from a slight effusion but no otitis media. Right TM is unremarkable. EACs unremarkable. Mastoids nontender. Posterior oropharynx shows mild erythema with bilaterally symmetric edema but exudate only on the left. No asymmetry to suggest PTA. Uvula is in the midline and nonedematous. Again, no peritonsillar abscess. No Grey's Angina. Neck: Supple. No meningismus through full range of motion. There is some anterior cervical and submandibular lymphadenopathy bilaterally. No posterior lymphadenopathy. Cardiac: Regular rate rhythm no murmur. Lungs: Good aeration throughout. No adventitious breath sounds. Abdomen: Soft, nontender, nonsurgical throughout. Normoactive bowel sounds. Extremities: No peripheral edema, negative Homans bilaterally no cords. Skin: No rash. Neuro: No gross neurologic deficits. Lab / Imaging Results   (All laboratory and radiology results have been personally reviewed by myself)  Labs:  No results found for this visit on 11/22/20. Imaging: All Radiology results interpreted by Radiologist unless otherwise noted. No orders to display     ED Course / Medical Decision Making   Medications - No data to display       Differential Diagnosis: Is extensive but includes viral URI, exudative pharyngitis, peritonsillar abscess,  epiglottitis, retropharyngeal abscess, Lemierre's syndrome, Grey's angina, community acquired pneumonia, etc.    MDM:     This is a 78 y.o. female who presents with the above history. On exam, appears to have a mild exudative pharyngitis without asymmetry to suggest PTA.   The left ear is unremarkable assessment slight serous effusion. Agrees that imaging is not currently indicated. Will be home-going with Augmentin. We did discuss Covid as a possibility however doubt that due to the exudative pharyngitis. Plan of Care: Normal progression of disease discussed. All questions answered. Explained symptomatic treatment. Instruction provided in the use of fluids, vaporizer, acetaminophen, and other OTC medication for symptom control. Extra fluids  Analgesics as needed, dose reviewed. Follow up as needed should symptoms fail to improve. Counseling: Homegoing. I discussed the differential, results and discharge plan with the patient and/or family/friend/caregiver if present. I emphasized the importance of follow-up with the physician I referred them to in the timeframe recommended. I explained reasons for the patient to return to the Emergency Department. Additional verbal discharge instructions were also given and discussed with the patient to supplement those generated by the EMR. We also discussed medications that were prescribed (if any) including common side effects and interactions. The patient was advised to abstain from driving, operating heavy machinery or making significant decisions while taking medications such as opiates and muscle relaxers that may impair this. All questions were addressed. They understand return precautions and discharge instructions. The patient and/or family/friend/caregiver expressed understanding. Assessment      1. Exudative pharyngitis      Plan   Discharge to home and advised to contact Noemy Tamayo MD   [facility] [address] [phone]      As needed   Patient condition is good    New Medications     New Prescriptions    AMOXICILLIN-CLAVULANATE (AUGMENTIN) 875-125 MG PER TABLET    Take 1 tablet by mouth 2 times daily for 10 days     Electronically signed by TISHA Mejias   DD: 11/22/20  **This report was transcribed

## 2021-05-27 ENCOUNTER — HOSPITAL ENCOUNTER (EMERGENCY)
Age: 81
Discharge: HOME | End: 2021-05-28
Attending: EMERGENCY MEDICINE
Payer: MEDICARE

## 2021-05-27 DIAGNOSIS — N20.0 KIDNEY STONE: Primary | ICD-10-CM

## 2021-05-27 PROCEDURE — 87088 URINE BACTERIA CULTURE: CPT

## 2021-05-27 PROCEDURE — 99283 EMERGENCY DEPT VISIT LOW MDM: CPT

## 2021-05-27 PROCEDURE — 81001 URINALYSIS AUTO W/SCOPE: CPT

## 2021-05-27 PROCEDURE — 84484 ASSAY OF TROPONIN QUANT: CPT

## 2021-05-27 PROCEDURE — 83690 ASSAY OF LIPASE: CPT

## 2021-05-27 PROCEDURE — 83605 ASSAY OF LACTIC ACID: CPT

## 2021-05-27 PROCEDURE — 85610 PROTHROMBIN TIME: CPT

## 2021-05-27 PROCEDURE — 96374 THER/PROPH/DIAG INJ IV PUSH: CPT

## 2021-05-27 PROCEDURE — 85025 COMPLETE CBC W/AUTO DIFF WBC: CPT

## 2021-05-27 ASSESSMENT — ENCOUNTER SYMPTOMS
SHORTNESS OF BREATH: 0
ABDOMINAL PAIN: 1
BACK PAIN: 0

## 2021-05-27 ASSESSMENT — PAIN SCALES - GENERAL: PAINLEVEL_OUTOF10: 8

## 2021-05-27 ASSESSMENT — PAIN DESCRIPTION - ORIENTATION: ORIENTATION: LEFT

## 2021-05-27 ASSESSMENT — PAIN DESCRIPTION - LOCATION: LOCATION: FLANK

## 2021-05-27 ASSESSMENT — PAIN DESCRIPTION - PAIN TYPE: TYPE: ACUTE PAIN

## 2021-05-28 ENCOUNTER — APPOINTMENT (OUTPATIENT)
Dept: CT IMAGING | Age: 81
End: 2021-05-28
Payer: MEDICARE

## 2021-05-28 ENCOUNTER — APPOINTMENT (OUTPATIENT)
Dept: GENERAL RADIOLOGY | Age: 81
End: 2021-05-28
Payer: MEDICARE

## 2021-05-28 VITALS
RESPIRATION RATE: 18 BRPM | HEART RATE: 79 BPM | TEMPERATURE: 98 F | SYSTOLIC BLOOD PRESSURE: 201 MMHG | DIASTOLIC BLOOD PRESSURE: 95 MMHG | OXYGEN SATURATION: 96 %

## 2021-05-28 LAB
ALBUMIN SERPL-MCNC: 3.9 G/DL (ref 3.5–5.2)
ALP BLD-CCNC: 99 U/L (ref 35–104)
ALT SERPL-CCNC: 16 U/L (ref 0–32)
ANION GAP SERPL CALCULATED.3IONS-SCNC: 12 MMOL/L (ref 7–16)
AST SERPL-CCNC: 19 U/L (ref 0–31)
BACTERIA: ABNORMAL /HPF
BASOPHILS ABSOLUTE: 0.04 E9/L (ref 0–0.2)
BASOPHILS RELATIVE PERCENT: 0.4 % (ref 0–2)
BILIRUB SERPL-MCNC: 0.4 MG/DL (ref 0–1.2)
BILIRUBIN URINE: NEGATIVE
BLOOD, URINE: ABNORMAL
BUN BLDV-MCNC: 22 MG/DL (ref 6–23)
CALCIUM SERPL-MCNC: 9.4 MG/DL (ref 8.6–10.2)
CHLORIDE BLD-SCNC: 107 MMOL/L (ref 98–107)
CLARITY: CLEAR
CO2: 18 MMOL/L (ref 22–29)
COLOR: YELLOW
CREAT SERPL-MCNC: 1.2 MG/DL (ref 0.5–1)
EOSINOPHILS ABSOLUTE: 0.1 E9/L (ref 0.05–0.5)
EOSINOPHILS RELATIVE PERCENT: 1 % (ref 0–6)
EPITHELIAL CELLS, UA: ABNORMAL /HPF
GFR AFRICAN AMERICAN: 52
GFR NON-AFRICAN AMERICAN: 43 ML/MIN/1.73
GLUCOSE BLD-MCNC: 110 MG/DL (ref 74–99)
GLUCOSE URINE: NEGATIVE MG/DL
HCT VFR BLD CALC: 45.7 % (ref 34–48)
HEMOGLOBIN: 15.1 G/DL (ref 11.5–15.5)
IMMATURE GRANULOCYTES #: 0.05 E9/L
IMMATURE GRANULOCYTES %: 0.5 % (ref 0–5)
INR BLD: 1.7
KETONES, URINE: NEGATIVE MG/DL
LACTIC ACID: 1 MMOL/L (ref 0.5–2.2)
LEUKOCYTE ESTERASE, URINE: ABNORMAL
LIPASE: 119 U/L (ref 13–60)
LYMPHOCYTES ABSOLUTE: 1.2 E9/L (ref 1.5–4)
LYMPHOCYTES RELATIVE PERCENT: 12.5 % (ref 20–42)
MCH RBC QN AUTO: 31.7 PG (ref 26–35)
MCHC RBC AUTO-ENTMCNC: 33 % (ref 32–34.5)
MCV RBC AUTO: 96 FL (ref 80–99.9)
MONOCYTES ABSOLUTE: 0.6 E9/L (ref 0.1–0.95)
MONOCYTES RELATIVE PERCENT: 6.2 % (ref 2–12)
NEUTROPHILS ABSOLUTE: 7.64 E9/L (ref 1.8–7.3)
NEUTROPHILS RELATIVE PERCENT: 79.4 % (ref 43–80)
NITRITE, URINE: NEGATIVE
PDW BLD-RTO: 13.9 FL (ref 11.5–15)
PH UA: 5.5 (ref 5–9)
PLATELET # BLD: 324 E9/L (ref 130–450)
PMV BLD AUTO: 11.9 FL (ref 7–12)
POTASSIUM REFLEX MAGNESIUM: 4.1 MMOL/L (ref 3.5–5)
PROTEIN UA: 30 MG/DL
PROTHROMBIN TIME: 19.7 SEC (ref 9.3–12.4)
RBC # BLD: 4.76 E12/L (ref 3.5–5.5)
RBC UA: ABNORMAL /HPF (ref 0–2)
REASON FOR REJECTION: NORMAL
REJECTED TEST: NORMAL
SODIUM BLD-SCNC: 137 MMOL/L (ref 132–146)
SPECIFIC GRAVITY UA: 1.02 (ref 1–1.03)
TOTAL PROTEIN: 6.5 G/DL (ref 6.4–8.3)
TROPONIN, HIGH SENSITIVITY: 21 NG/L (ref 0–9)
UROBILINOGEN, URINE: 0.2 E.U./DL
WBC # BLD: 9.6 E9/L (ref 4.5–11.5)
WBC UA: ABNORMAL /HPF (ref 0–5)

## 2021-05-28 PROCEDURE — 74176 CT ABD & PELVIS W/O CONTRAST: CPT

## 2021-05-28 PROCEDURE — 71045 X-RAY EXAM CHEST 1 VIEW: CPT

## 2021-05-28 PROCEDURE — 6360000002 HC RX W HCPCS

## 2021-05-28 PROCEDURE — 2580000003 HC RX 258: Performed by: EMERGENCY MEDICINE

## 2021-05-28 PROCEDURE — 36415 COLL VENOUS BLD VENIPUNCTURE: CPT

## 2021-05-28 PROCEDURE — 80053 COMPREHEN METABOLIC PANEL: CPT

## 2021-05-28 PROCEDURE — 84484 ASSAY OF TROPONIN QUANT: CPT

## 2021-05-28 RX ORDER — 0.9 % SODIUM CHLORIDE 0.9 %
500 INTRAVENOUS SOLUTION INTRAVENOUS ONCE
Status: COMPLETED | OUTPATIENT
Start: 2021-05-28 | End: 2021-05-28

## 2021-05-28 RX ORDER — ONDANSETRON 8 MG/1
8 TABLET, ORALLY DISINTEGRATING ORAL EVERY 8 HOURS PRN
Qty: 12 TABLET | Refills: 0 | Status: SHIPPED | OUTPATIENT
Start: 2021-05-28

## 2021-05-28 RX ORDER — KETOROLAC TROMETHAMINE 30 MG/ML
INJECTION, SOLUTION INTRAMUSCULAR; INTRAVENOUS
Status: COMPLETED
Start: 2021-05-28 | End: 2021-05-28

## 2021-05-28 RX ORDER — TAMSULOSIN HYDROCHLORIDE 0.4 MG/1
0.4 CAPSULE ORAL DAILY
Qty: 15 CAPSULE | Refills: 0 | Status: SHIPPED | OUTPATIENT
Start: 2021-05-28

## 2021-05-28 RX ORDER — HYDROCODONE BITARTRATE AND ACETAMINOPHEN 5; 325 MG/1; MG/1
1 TABLET ORAL EVERY 6 HOURS PRN
Qty: 12 TABLET | Refills: 0 | Status: SHIPPED | OUTPATIENT
Start: 2021-05-28 | End: 2021-05-31

## 2021-05-28 RX ORDER — KETOROLAC TROMETHAMINE 30 MG/ML
15 INJECTION, SOLUTION INTRAMUSCULAR; INTRAVENOUS ONCE
Status: COMPLETED | OUTPATIENT
Start: 2021-05-28 | End: 2021-05-28

## 2021-05-28 RX ORDER — PROMETHAZINE HYDROCHLORIDE 25 MG/ML
25 INJECTION, SOLUTION INTRAMUSCULAR; INTRAVENOUS ONCE
Status: DISCONTINUED | OUTPATIENT
Start: 2021-05-28 | End: 2021-05-28 | Stop reason: HOSPADM

## 2021-05-28 RX ADMIN — KETOROLAC TROMETHAMINE 15 MG: 30 INJECTION, SOLUTION INTRAMUSCULAR; INTRAVENOUS at 01:59

## 2021-05-28 RX ADMIN — SODIUM CHLORIDE 500 ML: 0.9 INJECTION, SOLUTION INTRAVENOUS at 02:00

## 2021-05-28 ASSESSMENT — PAIN SCALES - GENERAL: PAINLEVEL_OUTOF10: 7

## 2021-05-28 NOTE — ED PROVIDER NOTES
This is an 78year old female with a PMH of Renal Stones who presents to the ED for evaluation of flank pain. Patient states that yesterday she developed pain to her left low back. She states that this pain was initally felt about one week ago but seemed to resolve spontanously on its own. She states that since yesterday it now goes up to her left low back to her left upper back/chest. She states that it again has resolved spontanously on its own. She states that she has some nausea which has also improved in severity. She has no abdomainal pain, fevers or chills. She states that she has no diarrhea or constipation. She states that she has been having some rectal pain with bowel movements. She states that too has resolved. The history is provided by the patient. No  was used. Review of Systems   Constitutional: Negative for fever. HENT: Negative for congestion. Eyes: Negative for visual disturbance. Respiratory: Negative for shortness of breath. Cardiovascular: Positive for chest pain. Gastrointestinal: Positive for abdominal pain. Endocrine: Negative for polyuria. Genitourinary: Positive for flank pain. Musculoskeletal: Negative for back pain. Skin: Negative for rash. Allergic/Immunologic: Negative for immunocompromised state. Neurological: Negative for headaches. Hematological: Does not bruise/bleed easily. Psychiatric/Behavioral: Negative for confusion. Physical Exam  Vitals and nursing note reviewed. Constitutional:       General: She is not in acute distress. Appearance: She is well-developed. She is not ill-appearing. HENT:      Head: Normocephalic and atraumatic. Mouth/Throat:      Mouth: Mucous membranes are moist.   Eyes:      Extraocular Movements: Extraocular movements intact. Pupils: Pupils are equal, round, and reactive to light. Neck:      Vascular: No JVD.    Cardiovascular:      Rate and Rhythm: Normal rate and regular rhythm. Heart sounds: No murmur heard. Pulmonary:      Effort: Pulmonary effort is normal.      Breath sounds: No wheezing, rhonchi or rales. Chest:      Chest wall: No tenderness. Abdominal:      General: There is no distension. Palpations: Abdomen is soft. Tenderness: There is no abdominal tenderness. There is left CVA tenderness. There is no guarding or rebound. Hernia: No hernia is present. Musculoskeletal:      Cervical back: Normal range of motion and neck supple. Right lower leg: No edema. Left lower leg: No edema. Skin:     General: Skin is warm and dry. Capillary Refill: Capillary refill takes less than 2 seconds. Neurological:      General: No focal deficit present. Mental Status: She is alert and oriented to person, place, and time. Mental status is at baseline. Cranial Nerves: No cranial nerve deficit. Psychiatric:         Mood and Affect: Mood normal.         Behavior: Behavior normal.          Procedures     MDM  Number of Diagnoses or Management Options  Kidney stone  Diagnosis management comments: Patient was found to have a left sided renal stone about 9 mm. Her pain was well controlled and was able to tolerate PO intake. Patient was nontoxic. She had no signs of a septic stone and was treated with analgesics. She was offered admission given the size of the stone. She refused stating that she did not want to be seen by the urologist here at the [address] [place] [address] as she had an issue in the past. Given that her pain was controlled I felt that she could follow up outpatient tomorrow with another group that was not [facility]. Patient given strict return precautions.  She was agreeable to plan.                       --------------------------------------------- PAST HISTORY ---------------------------------------------  Past Medical History:  has a past medical history of Anxiety, Cancer ([address] DJD (degenerative joint disease), Hyperlipidemia, Troponin, High Sensitivity 21 (H) 0 - 9 ng/L   Lipase   Result Value Ref Range    Lipase 119 (H) 13 - 60 U/L   Lactic Acid, Plasma   Result Value Ref Range    Lactic Acid 1.0 0.5 - 2.2 mmol/L   Urinalysis with Microscopic   Result Value Ref Range    Color, UA Yellow Straw/Yellow    Clarity, UA Clear Clear    Glucose, Ur Negative Negative mg/dL    Bilirubin Urine Negative Negative    Ketones, Urine Negative Negative mg/dL    Specific Gravity, UA 1.025 1.005 - 1.030    Blood, Urine TRACE (A) Negative    pH, UA 5.5 5.0 - 9.0    Protein, UA 30 (A) Negative mg/dL    Urobilinogen, Urine 0.2 <2.0 E.U./dL    Nitrite, Urine Negative Negative    Leukocyte Esterase, Urine TRACE (A) Negative    WBC, UA 1-3 0 - 5 /HPF    RBC, UA 0-1 0 - 2 /HPF    Epithelial Cells, UA NONE SEEN /HPF    Bacteria, UA RARE (A) None Seen /HPF   Protime-INR   Result Value Ref Range    Protime 19.7 (H) 9.3 - 12.4 sec    INR 1.7    Troponin   Result Value Ref Range    Troponin, High Sensitivity 21 (H) 0 - 9 ng/L   Comprehensive Metabolic Panel w/ Reflex to MG   Result Value Ref Range    Sodium 137 132 - 146 mmol/L    Potassium reflex Magnesium 4.1 3.5 - 5.0 mmol/L    Chloride 107 98 - 107 mmol/L    CO2 18 (L) 22 - 29 mmol/L    Anion Gap 12 7 - 16 mmol/L    Glucose 110 (H) 74 - 99 mg/dL    BUN 22 6 - 23 mg/dL    CREATININE 1.2 (H) 0.5 - 1.0 mg/dL    GFR Non-African American 43 >=60 mL/min/1.73    GFR African American 52     Calcium 9.4 8.6 - 10.2 mg/dL    Total Protein 6.5 6.4 - 8.3 g/dL    Albumin 3.9 3.5 - 5.2 g/dL    Total Bilirubin 0.4 0.0 - 1.2 mg/dL    Alkaline Phosphatase 99 35 - 104 U/L    ALT 16 0 - 32 U/L    AST 19 0 - 31 U/L   SPECIMEN REJECTION   Result Value Ref Range    Rejected Test cmpx     Reason for Rejection see below        Radiology:  XR CHEST PORTABLE   Final Result   No radiographic evidence of acute cardiopulmonary disease.          CT ABDOMEN PELVIS WO CONTRAST Additional Contrast? None   Final Result   Moderate left-sided hydronephrosis to the level of a 9 mm stone in the ureter   near the level of the iliac vasculature. Other chronic appearing findings similar to previous.             ------------------------- NURSING NOTES AND VITALS REVIEWED ---------------------------  Date / Time Roomed:  5/27/2021 10:22 PM  ED Bed Assignment:  21/21    The nursing notes within the ED encounter and vital signs as below have been reviewed. BP (!) 201/95   Pulse 79   Temp 98 °F (36.7 °C) (Temporal)   Resp 18   SpO2 96%   Oxygen Saturation Interpretation: Normal      ------------------------------------------ PROGRESS NOTES ------------------------------------------  1:31 AM EDT  I have spoken with the patient and discussed todays results, in addition to providing specific details for the plan of care and counseling regarding the diagnosis and prognosis. Their questions are answered at this time and they are agreeable with the plan. I discussed at length with them reasons for immediate return here for re evaluation. They will followup with their urologist.       --------------------------------- ADDITIONAL PROVIDER NOTES ---------------------------------  At this time the patient is without objective evidence of an acute process requiring hospitalization or inpatient management. They have remained hemodynamically stable throughout their entire ED visit and are stable for discharge with outpatient follow-up. The plan has been discussed in detail and they are aware of the specific conditions for emergent return, as well as the importance of follow-up. New Prescriptions    HYDROCODONE-ACETAMINOPHEN (NORCO) 5-325 MG PER TABLET    Take 1 tablet by mouth every 6 hours as needed for Pain for up to 3 days. Intended supply: 3 days.  Take lowest dose possible to manage pain    ONDANSETRON (ZOFRAN ODT) 8 MG TBDP DISINTEGRATING TABLET    Take 1 tablet by mouth every 8 hours as needed for Nausea    TAMSULOSIN (FLOMAX) 0.4 MG CAPSULE Take 1 capsule by mouth daily       Diagnosis:  1. Kidney stone        Disposition:  Patient's disposition: Discharge to home  Patient's condition is stable.      Marisabel Webb DO  05/28/21 5840

## 2021-05-30 LAB — URINE CULTURE, ROUTINE: NORMAL

## 2021-06-01 LAB — TROPONIN, HIGH SENSITIVITY: ABNORMAL NG/L (ref 0–9)

## 2021-08-31 ENCOUNTER — PROCEDURE VISIT (OUTPATIENT)
Dept: AUDIOLOGY | Age: 81
End: 2021-08-31
Payer: MEDICARE

## 2021-08-31 ENCOUNTER — OFFICE VISIT (OUTPATIENT)
Dept: ENT CLINIC | Age: 81
End: 2021-08-31
Payer: MEDICARE

## 2021-08-31 VITALS
HEART RATE: 82 BPM | SYSTOLIC BLOOD PRESSURE: 146 MMHG | DIASTOLIC BLOOD PRESSURE: 80 MMHG | BODY MASS INDEX: 26.86 KG/M2 | WEIGHT: 133 LBS

## 2021-08-31 DIAGNOSIS — H93.8X2 SENSATION OF FULLNESS IN LEFT EAR: ICD-10-CM

## 2021-08-31 DIAGNOSIS — H93.8X2 EAR FULLNESS, LEFT: Primary | ICD-10-CM

## 2021-08-31 DIAGNOSIS — H72.92 PERFORATION OF LEFT TYMPANIC MEMBRANE: ICD-10-CM

## 2021-08-31 DIAGNOSIS — H65.02 ACUTE SEROUS OTITIS MEDIA OF LEFT EAR, RECURRENCE NOT SPECIFIED: ICD-10-CM

## 2021-08-31 PROCEDURE — 92567 TYMPANOMETRY: CPT | Performed by: AUDIOLOGIST

## 2021-08-31 PROCEDURE — 1090F PRES/ABSN URINE INCON ASSESS: CPT | Performed by: NURSE PRACTITIONER

## 2021-08-31 PROCEDURE — 4040F PNEUMOC VAC/ADMIN/RCVD: CPT | Performed by: NURSE PRACTITIONER

## 2021-08-31 PROCEDURE — G8417 CALC BMI ABV UP PARAM F/U: HCPCS | Performed by: NURSE PRACTITIONER

## 2021-08-31 PROCEDURE — G8427 DOCREV CUR MEDS BY ELIG CLIN: HCPCS | Performed by: NURSE PRACTITIONER

## 2021-08-31 PROCEDURE — 99203 OFFICE O/P NEW LOW 30 MIN: CPT | Performed by: NURSE PRACTITIONER

## 2021-08-31 PROCEDURE — G8400 PT W/DXA NO RESULTS DOC: HCPCS | Performed by: NURSE PRACTITIONER

## 2021-08-31 PROCEDURE — 1036F TOBACCO NON-USER: CPT | Performed by: NURSE PRACTITIONER

## 2021-08-31 PROCEDURE — 1123F ACP DISCUSS/DSCN MKR DOCD: CPT | Performed by: NURSE PRACTITIONER

## 2021-08-31 RX ORDER — FLUTICASONE PROPIONATE 50 MCG
2 SPRAY, SUSPENSION (ML) NASAL DAILY
Qty: 1 BOTTLE | Refills: 5 | Status: SHIPPED
Start: 2021-08-31 | End: 2021-09-01

## 2021-08-31 NOTE — PROGRESS NOTES
[facility] Otolaryngology  Dr. Dow Rubinstein. LIBAN Carpenter Ms.Ed. New Consult       Patient Name:  Errol Leos  :  1940     CHIEF C/O:    Chief Complaint   Patient presents with    Ear Problem     ear pain possible infection of Left ear- was given meds 4 wks ago, bilateral ears still draining and itching       HISTORY OBTAINED FROM:  patient    HISTORY OF PRESENT ILLNESS:       Ambika Bolton is a 78y.o. year old female, here today for left ear infection    States that her symptoms began 4 weeks ago  Mild discomfort in the left ear with drainage  Was seen by PCP and placed on oral antibiotics and drops   Symptoms have since resolved  Continues to have clear drainage from both ears  No hx of recurrent ear infections or previous ear surgies  Having issues with feedback in left hearing aid since they were returned 2 weeks ago  No ringing in the ears  No dizziness  No recent fevers          Past Medical History:   Diagnosis Date    Anxiety     no medications, becomes short of breath when upset/ anxious    Cancer ([address] 2013    uterine    DJD (degenerative joint disease) 2014    Rt. RICHARD 2014 ZANE Clayton MD    Hyperlipidemia     no meds    Hypertension     Irregular heart beat     1 episode while hospitalized for kidney stone    Osteoarthritis 2016    Lt. RICHARD 2016 ZANE Clayton MD    Preoperative clearance     Dr Kam Alexander, medical for right hip arthroplasty     Renal calculi     for or 2015    Ureteral perforation secondary to stent manipulation 2015     Past Surgical History:   Procedure Laterality Date    CHOLECYSTECTOMY      HYSTERECTOMY      LITHOTRIPSY Left 2015    LEFT RENAL CALCULI    OTHER SURGICAL HISTORY  05-08-15    Cystoscopy retrograde pyelogram    SKIN BIOPSY      skin cancer nose    TOTAL HIP ARTHROPLASTY Right 2014    right hip ZANE Clayton MD    TOTAL HIP ARTHROPLASTY Left 2016    left hip ZANE Clayton MD       Current Outpatient Medications:    vitamin D 25 MCG (1000 UT) CAPS, Take by mouth, Disp: , Rfl:     amLODIPine (NORVASC) 5 MG tablet, TK 1 T PO QD, Disp: , Rfl:     rivaroxaban (XARELTO) 10 MG TABS tablet, Take 10 mg by mouth, Disp: , Rfl:     digoxin (LANOXIN) 125 MCG tablet, Take 1 tablet by mouth daily, Disp: 30 tablet, Rfl: 3    Multiple Vitamins-Minerals (RA VISION-MARIA ELENA PRESERVE PO), Take by mouth daily LD 7/12/16, Disp: , Rfl:     ondansetron (ZOFRAN ODT) 8 MG TBDP disintegrating tablet, Take 1 tablet by mouth every 8 hours as needed for Nausea (Patient not taking: Reported on 8/31/2021), Disp: 12 tablet, Rfl: 0    tamsulosin (FLOMAX) 0.4 MG capsule, Take 1 capsule by mouth daily (Patient not taking: Reported on 8/31/2021), Disp: 15 capsule, Rfl: 0  Iodine, Other, Cefoxitin sodium in dextrose, Hycomine compound [pe-cpm-hydrocod-apap-caff], Iodine, and Mefoxin [cefoxitin]  Social History     Tobacco Use    Smoking status: Never Smoker    Smokeless tobacco: Never Used   Substance Use Topics    Alcohol use: Yes     Alcohol/week: 0.0 standard drinks     Comment: occassional     Drug use: No     Family History   Problem Relation Age of Onset    Kidney Disease Mother     Heart Disease Father        Review of Systems   Constitutional: Negative. Negative for activity change and appetite change. HENT: Positive for ear discharge, ear pain and hearing loss. Negative for congestion, postnasal drip, rhinorrhea, sinus pressure and sinus pain. Eyes: Negative. Respiratory: Negative. Negative for shortness of breath and stridor. Cardiovascular: Negative. Negative for chest pain and palpitations. Endocrine: Negative. Musculoskeletal: Negative. Skin: Negative. Neurological: Negative. Negative for dizziness. Hematological: Negative. Psychiatric/Behavioral: Negative. BP (!) 146/80   Pulse 82   Wt 133 lb (60.3 kg)   BMI 26.86 kg/m²   Physical Exam  Constitutional:       Appearance: Normal appearance.    HENT: Head: Normocephalic. Right Ear: Tympanic membrane, ear canal and external ear normal.      Left Ear: Ear canal and external ear normal. A middle ear effusion is present. Ears:        Nose: Nose normal. No rhinorrhea. Right Turbinates: Swollen and pale. Left Turbinates: Swollen and pale. Mouth/Throat:      Lips: Pink. Mouth: Mucous membranes are moist.      Pharynx: Oropharynx is clear. Eyes:      Conjunctiva/sclera: Conjunctivae normal.      Pupils: Pupils are equal, round, and reactive to light. Cardiovascular:      Rate and Rhythm: Normal rate and regular rhythm. Pulses: Normal pulses. Pulmonary:      Effort: Pulmonary effort is normal. No respiratory distress. Breath sounds: No stridor. Musculoskeletal:         General: Normal range of motion. Cervical back: Normal range of motion. No rigidity. No muscular tenderness. Skin:     General: Skin is warm and dry. Neurological:      General: No focal deficit present. Mental Status: She is alert and oriented to person, place, and time. Psychiatric:         Mood and Affect: Mood normal.         Behavior: Behavior normal.         Thought Content: Thought content normal.         Judgment: Judgment normal.          Left TM:          Tympanogram reviewed with patient. Type a curve in the ear, with flat curve in the left ear. IMPRESSION/PLAN:    Lawrence Griggs was seen today for ear problem. Diagnoses and all orders for this visit:    Ear fullness, left  -     Tympanometry; Future    Perforation of left tympanic membrane    Acute serous otitis media of left ear, recurrence not specified    Other orders  -     Discontinue: fluticasone (FLONASE) 50 MCG/ACT nasal spray; 2 sprays by Each Nostril route daily        Patient was placed on Flonase, 2 sprays each nostril once daily. She is also instructed to begin using her Cipro drops for any drainage from the left ear.  She will call the office for any drainage possible in 5 to 7 days. There is a scabbed area of the left TM from possible recent perforation. We will continue to monitor and consider CT of the IAC in the future for possible cholesteatoma without improvement. She will follow up in 6 weeks. She is instructed to call with any new or worsening symptoms prior to her next appointment.         Robby Jiang, ELIANE, FNP-C  [facility], Nose and Throat    The information contained in this note has been dictated using drug and medical speech recognition software and may contain errors

## 2021-09-01 RX ORDER — FLUTICASONE PROPIONATE 50 MCG
SPRAY, SUSPENSION (ML) NASAL
Qty: 48 G | Refills: 1 | Status: SHIPPED
Start: 2021-09-01 | End: 2022-08-20 | Stop reason: ALTCHOICE

## 2021-09-08 ASSESSMENT — ENCOUNTER SYMPTOMS
RESPIRATORY NEGATIVE: 1
SINUS PRESSURE: 0
EYES NEGATIVE: 1
STRIDOR: 0
SINUS PAIN: 0
RHINORRHEA: 0
SHORTNESS OF BREATH: 0

## 2021-10-12 ENCOUNTER — OFFICE VISIT (OUTPATIENT)
Dept: ENT CLINIC | Age: 81
End: 2021-10-12
Payer: MEDICARE

## 2021-10-12 ENCOUNTER — PROCEDURE VISIT (OUTPATIENT)
Dept: AUDIOLOGY | Age: 81
End: 2021-10-12
Payer: MEDICARE

## 2021-10-12 VITALS
DIASTOLIC BLOOD PRESSURE: 72 MMHG | WEIGHT: 133 LBS | HEART RATE: 78 BPM | BODY MASS INDEX: 26.81 KG/M2 | HEIGHT: 59 IN | SYSTOLIC BLOOD PRESSURE: 162 MMHG

## 2021-10-12 DIAGNOSIS — H93.8X2 SENSATION OF FULLNESS IN LEFT EAR: ICD-10-CM

## 2021-10-12 DIAGNOSIS — H60.542 ECZEMATOID OTITIS EXTERNA OF LEFT EAR, UNSPECIFIED CHRONICITY: ICD-10-CM

## 2021-10-12 DIAGNOSIS — H69.82 DYSFUNCTION OF LEFT EUSTACHIAN TUBE: ICD-10-CM

## 2021-10-12 DIAGNOSIS — H93.8X2 EAR FULLNESS, LEFT: Primary | ICD-10-CM

## 2021-10-12 PROCEDURE — 4130F TOPICAL PREP RX AOE: CPT | Performed by: NURSE PRACTITIONER

## 2021-10-12 PROCEDURE — 92567 TYMPANOMETRY: CPT | Performed by: AUDIOLOGIST

## 2021-10-12 PROCEDURE — 1090F PRES/ABSN URINE INCON ASSESS: CPT | Performed by: NURSE PRACTITIONER

## 2021-10-12 PROCEDURE — 1123F ACP DISCUSS/DSCN MKR DOCD: CPT | Performed by: NURSE PRACTITIONER

## 2021-10-12 PROCEDURE — G8484 FLU IMMUNIZE NO ADMIN: HCPCS | Performed by: NURSE PRACTITIONER

## 2021-10-12 PROCEDURE — G8427 DOCREV CUR MEDS BY ELIG CLIN: HCPCS | Performed by: NURSE PRACTITIONER

## 2021-10-12 PROCEDURE — G8400 PT W/DXA NO RESULTS DOC: HCPCS | Performed by: NURSE PRACTITIONER

## 2021-10-12 PROCEDURE — G8417 CALC BMI ABV UP PARAM F/U: HCPCS | Performed by: NURSE PRACTITIONER

## 2021-10-12 PROCEDURE — 1036F TOBACCO NON-USER: CPT | Performed by: NURSE PRACTITIONER

## 2021-10-12 PROCEDURE — 99213 OFFICE O/P EST LOW 20 MIN: CPT | Performed by: NURSE PRACTITIONER

## 2021-10-12 PROCEDURE — 4040F PNEUMOC VAC/ADMIN/RCVD: CPT | Performed by: NURSE PRACTITIONER

## 2021-10-12 RX ORDER — TRIAMCINOLONE ACETONIDE 0.25 MG/G
CREAM TOPICAL
Qty: 1 EACH | Refills: 2 | Status: SHIPPED | OUTPATIENT
Start: 2021-10-12

## 2021-10-12 NOTE — PROGRESS NOTES
This patient was referred for tympanometric testing by NIVIA Lim due to ear fullness, left ear, that has resolved since her last visit. Tympanometry revealed normal middle ear peak pressure and compliance, bilaterally. ipsilateral acoustic reflexes were absent, right ear and present, left ear at 1000Hz. The results were reviewed with the patient. Recommendations for follow up will be made pending physician consult.     Shiela Choi CCC/DEEDEE  Audiologist  [id number]  NPI#:  [id number]

## 2021-10-12 NOTE — PROGRESS NOTES
[facility] Otolaryngology  Dr. Emre Morfin. Sophie Mathur. Ms.Ed        Patient Name:  Alesia Lim  :  1940     CHIEF C/O:    Chief Complaint   Patient presents with    Ear Problem     6 wk f/u ear pain and drainage. some drainage and they itch       HISTORY OBTAINED FROM:  patient    HISTORY OF PRESENT ILLNESS:       Elisabeth Faria is a 78y.o. year old female, here today for follow up of left ear fullness. Last seen 6 weeks ago  Started on flonase for left ETD  States some improvement but stopped the medication due to headaches  Complains of increased itching in the left ear  No current pain or drainage  No current sinus congestion. Intermittent PND and rhinorrhea  No recent fevers. Past Medical History:   Diagnosis Date    Anxiety     no medications, becomes short of breath when upset/ anxious    Cancer ([address]     uterine    DJD (degenerative joint disease) 2014    Rt. RICHARD 2014 ZANE Clayton MD    Hyperlipidemia     no meds    Hypertension     Irregular heart beat     1 episode while hospitalized for kidney stone    Osteoarthritis 2016    Lt. RICHARD 2016 ZANE Clayton MD    Preoperative clearance     Dr Claudine Salamanca, medical for right hip arthroplasty     Renal calculi     for or 2015    Ureteral perforation secondary to stent manipulation 2015     Past Surgical History:   Procedure Laterality Date    CHOLECYSTECTOMY      HYSTERECTOMY      LITHOTRIPSY Left 2015    LEFT RENAL CALCULI    OTHER SURGICAL HISTORY  05-08-15    Cystoscopy retrograde pyelogram    SKIN BIOPSY      skin cancer nose    TOTAL HIP ARTHROPLASTY Right 2014    right hip ZANE Clayton MD    TOTAL HIP ARTHROPLASTY Left 2016    left hip ZANE Clayton MD       Current Outpatient Medications:     fluticasone (FLONASE) 50 MCG/ACT nasal spray, SHAKE LIQUID AND USE 2 SPRAYS IN EACH NOSTRIL DAILY, Disp: 48 g, Rfl: 1    vitamin D 25 MCG (1000 UT) CAPS, Take by mouth, Disp: , Rfl:     ondansetron (ZOFRAN ODT) 8 MG TBDP disintegrating tablet, Take 1 tablet by mouth every 8 hours as needed for Nausea, Disp: 12 tablet, Rfl: 0    tamsulosin (FLOMAX) 0.4 MG capsule, Take 1 capsule by mouth daily, Disp: 15 capsule, Rfl: 0    amLODIPine (NORVASC) 5 MG tablet, TK 1 T PO QD, Disp: , Rfl:     rivaroxaban (XARELTO) 10 MG TABS tablet, Take 10 mg by mouth, Disp: , Rfl:     digoxin (LANOXIN) 125 MCG tablet, Take 1 tablet by mouth daily, Disp: 30 tablet, Rfl: 3    Multiple Vitamins-Minerals (RA VISION-MARIA ELENA PRESERVE PO), Take by mouth daily LD 7/12/16, Disp: , Rfl:   Iodine, Other, Cefoxitin sodium in dextrose, Hycomine compound [pe-cpm-hydrocod-apap-caff], Iodine, and Mefoxin [cefoxitin]  Social History     Tobacco Use    Smoking status: Never Smoker    Smokeless tobacco: Never Used   Substance Use Topics    Alcohol use: Yes     Alcohol/week: 0.0 standard drinks     Comment: occassional     Drug use: No     Family History   Problem Relation Age of Onset    Kidney Disease Mother     Heart Disease Father        Review of Systems   Constitutional: Negative. Negative for activity change and appetite change. HENT: Positive for ear discharge, ear pain and hearing loss. Negative for congestion, postnasal drip, rhinorrhea, sinus pressure and sinus pain. Eyes: Negative. Respiratory: Negative. Negative for shortness of breath and stridor. Cardiovascular: Negative. Negative for chest pain and palpitations. Endocrine: Negative. Musculoskeletal: Negative. Skin: Negative. Neurological: Negative. Negative for dizziness. Hematological: Negative. Psychiatric/Behavioral: Negative. BP (!) 162/72   Pulse 78   Ht 4' 11\" (1.499 m)   Wt 133 lb (60.3 kg)   BMI 26.86 kg/m²   Physical Exam  Constitutional:       Appearance: Normal appearance. HENT:      Head: Normocephalic.       Right Ear: Tympanic membrane, ear canal and external ear normal.      Left Ear: Tympanic membrane, ear canal and external ear normal.      Nose: Nose normal. No rhinorrhea. Right Turbinates: Not swollen or pale. Left Turbinates: Not swollen or pale. Mouth/Throat:      Lips: Pink. Mouth: Mucous membranes are moist.      Pharynx: Oropharynx is clear. Eyes:      Conjunctiva/sclera: Conjunctivae normal.      Pupils: Pupils are equal, round, and reactive to light. Cardiovascular:      Rate and Rhythm: Normal rate and regular rhythm. Pulses: Normal pulses. Pulmonary:      Effort: Pulmonary effort is normal. No respiratory distress. Breath sounds: No stridor. Musculoskeletal:         General: Normal range of motion. Cervical back: Normal range of motion. No rigidity. No muscular tenderness. Skin:     General: Skin is warm and dry. Neurological:      General: No focal deficit present. Mental Status: She is alert and oriented to person, place, and time. Psychiatric:         Mood and Affect: Mood normal.         Behavior: Behavior normal.         Thought Content: Thought content normal.         Judgment: Judgment normal.             Tympanogram reviewed with patient. Type a curves bilaterally. IMPRESSION/PLAN:    Paty Ballesteros was seen today for ear problem. Diagnoses and all orders for this visit:    Ear fullness, left  -     Tympanometry; Future    Dysfunction of left eustachian tube    Eczematoid otitis externa of left ear, unspecified chronicity    Other orders  -     triamcinolone (KENALOG) 0.025 % cream; Apply Topically to both ears for itching 2-3 times per week as needed. Stop when symptoms resolve. Patient will be placed on Kenalog cream, to be applied to the left ear 2-3 times weekly as needed for itching. She will follow up in 3 months for reevaluation. She is instructed to call with any new or worsening symptoms prior to her next appointment.         Rosaline Florence, MSN, FNP-C  [facility], Nose and Throat    The information contained in this note has been dictated using drug and medical speech recognition software and may contain errors

## 2021-10-22 ASSESSMENT — ENCOUNTER SYMPTOMS
RHINORRHEA: 0
EYES NEGATIVE: 1
SHORTNESS OF BREATH: 0
RESPIRATORY NEGATIVE: 1
SINUS PRESSURE: 0
STRIDOR: 0
SINUS PAIN: 0

## 2021-10-28 ENCOUNTER — PROCEDURE VISIT (OUTPATIENT)
Dept: AUDIOLOGY | Age: 81
End: 2021-10-28
Payer: MEDICARE

## 2021-10-28 ENCOUNTER — OFFICE VISIT (OUTPATIENT)
Dept: ENT CLINIC | Age: 81
End: 2021-10-28
Payer: MEDICARE

## 2021-10-28 VITALS
DIASTOLIC BLOOD PRESSURE: 71 MMHG | SYSTOLIC BLOOD PRESSURE: 124 MMHG | BODY MASS INDEX: 27.21 KG/M2 | WEIGHT: 135 LBS | HEIGHT: 59 IN

## 2021-10-28 DIAGNOSIS — H93.8X3 EAR PRESSURE, BILATERAL: Primary | ICD-10-CM

## 2021-10-28 DIAGNOSIS — H60.542 ECZEMATOID OTITIS EXTERNA OF LEFT EAR, UNSPECIFIED CHRONICITY: ICD-10-CM

## 2021-10-28 DIAGNOSIS — H93.8X2 EAR FULLNESS, LEFT: ICD-10-CM

## 2021-10-28 DIAGNOSIS — H93.8X3 PRESSURE SENSATION IN BOTH EARS: ICD-10-CM

## 2021-10-28 PROCEDURE — G8427 DOCREV CUR MEDS BY ELIG CLIN: HCPCS | Performed by: NURSE PRACTITIONER

## 2021-10-28 PROCEDURE — 1123F ACP DISCUSS/DSCN MKR DOCD: CPT | Performed by: NURSE PRACTITIONER

## 2021-10-28 PROCEDURE — G8400 PT W/DXA NO RESULTS DOC: HCPCS | Performed by: NURSE PRACTITIONER

## 2021-10-28 PROCEDURE — 1036F TOBACCO NON-USER: CPT | Performed by: NURSE PRACTITIONER

## 2021-10-28 PROCEDURE — 99213 OFFICE O/P EST LOW 20 MIN: CPT | Performed by: NURSE PRACTITIONER

## 2021-10-28 PROCEDURE — 4040F PNEUMOC VAC/ADMIN/RCVD: CPT | Performed by: NURSE PRACTITIONER

## 2021-10-28 PROCEDURE — 1090F PRES/ABSN URINE INCON ASSESS: CPT | Performed by: NURSE PRACTITIONER

## 2021-10-28 PROCEDURE — G8484 FLU IMMUNIZE NO ADMIN: HCPCS | Performed by: NURSE PRACTITIONER

## 2021-10-28 PROCEDURE — 92567 TYMPANOMETRY: CPT | Performed by: AUDIOLOGIST

## 2021-10-28 PROCEDURE — 4130F TOPICAL PREP RX AOE: CPT | Performed by: NURSE PRACTITIONER

## 2021-10-28 PROCEDURE — G8417 CALC BMI ABV UP PARAM F/U: HCPCS | Performed by: NURSE PRACTITIONER

## 2021-10-28 NOTE — PROGRESS NOTES
This patient was referred for tympanometric testing by NIVIA Adams due to bilateral ear pressure. Tympanometry revealed normal middle ear peak pressure and compliance, bilaterally. Ipsilateral acoustic reflexes were absent, bilaterally at 1000Hz. The results were reviewed with the patient. Recommendations for follow up will be made pending physician consult.     Rosa Delgadillo CCC/DEEDEE  Audiologist  [id number]  NPI#:  [id number]

## 2021-10-28 NOTE — PROGRESS NOTES
[facility] Otolaryngology  Dr. Guerda Li. Nikkie Mack. Ms.Ed        Patient Name:  India Martinez  :  1940     CHIEF C/O:    Chief Complaint   Patient presents with    Hearing Problem     patient f/u after hearing eval         HISTORY OBTAINED FROM:  patient    HISTORY OF PRESENT ILLNESS:       Cliff Myles is a 78y.o. year old female, here today for follow up of ear itching. Was last seen 1 month ago  Started on kenalog cream for itching  No significant changes  Saw dermatology and placed on 2 new creams that seem to be helping some  Still having issues with hearing aids, still waiting for proper ear molds  Feels like hearing is decreased currently  Denies current pain or pressure  Intermittent discomfort in the right ear, but constant  No new dizziness         Past Medical History:   Diagnosis Date    Anxiety     no medications, becomes short of breath when upset/ anxious    Cancer ([address] 2013    uterine    DJD (degenerative joint disease) 2014    Rt. RICHARD 2014 ZANE Clayton MD    Hyperlipidemia     no meds    Hypertension     Irregular heart beat     1 episode while hospitalized for kidney stone    Osteoarthritis 2016    Lt. RICHARD 2016 ZANE Clayton MD    Preoperative clearance     Dr Nivia Perez, medical for right hip arthroplasty     Renal calculi     for or 2015    Ureteral perforation secondary to stent manipulation 2015     Past Surgical History:   Procedure Laterality Date    CHOLECYSTECTOMY      HYSTERECTOMY      LITHOTRIPSY Left 2015    LEFT RENAL CALCULI    OTHER SURGICAL HISTORY  05-08-15    Cystoscopy retrograde pyelogram    SKIN BIOPSY      skin cancer nose    TOTAL HIP ARTHROPLASTY Right 2014    right hip ZANE Clayton MD    TOTAL HIP ARTHROPLASTY Left 2016    left hip ZANE Clayton MD       Current Outpatient Medications:     triamcinolone (KENALOG) 0.025 % cream, Apply Topically to both ears for itching 2-3 times per week as needed.   Stop when symptoms resolve., Disp: 1 each, Rfl: 2    fluticasone (FLONASE) 50 MCG/ACT nasal spray, SHAKE LIQUID AND USE 2 SPRAYS IN EACH NOSTRIL DAILY, Disp: 48 g, Rfl: 1    vitamin D 25 MCG (1000 UT) CAPS, Take by mouth, Disp: , Rfl:     ondansetron (ZOFRAN ODT) 8 MG TBDP disintegrating tablet, Take 1 tablet by mouth every 8 hours as needed for Nausea, Disp: 12 tablet, Rfl: 0    tamsulosin (FLOMAX) 0.4 MG capsule, Take 1 capsule by mouth daily, Disp: 15 capsule, Rfl: 0    amLODIPine (NORVASC) 5 MG tablet, TK 1 T PO QD, Disp: , Rfl:     rivaroxaban (XARELTO) 10 MG TABS tablet, Take 10 mg by mouth, Disp: , Rfl:     digoxin (LANOXIN) 125 MCG tablet, Take 1 tablet by mouth daily, Disp: 30 tablet, Rfl: 3    Multiple Vitamins-Minerals (RA VISION-MARIA ELENA PRESERVE PO), Take by mouth daily LD 7/12/16, Disp: , Rfl:   Iodine, Other, Cefoxitin sodium in dextrose, Hycomine compound [pe-cpm-hydrocod-apap-caff], Iodine, and Mefoxin [cefoxitin]  Social History     Tobacco Use    Smoking status: Never Smoker    Smokeless tobacco: Never Used   Substance Use Topics    Alcohol use: Yes     Alcohol/week: 0.0 standard drinks     Comment: occassional     Drug use: No     Family History   Problem Relation Age of Onset    Kidney Disease Mother     Heart Disease Father        Review of Systems   Constitutional: Negative. Negative for activity change and appetite change. HENT: Positive for hearing loss. Negative for congestion, ear discharge, ear pain, postnasal drip, rhinorrhea, sinus pressure and sinus pain. Itching of ears   Eyes: Negative. Respiratory: Negative. Negative for shortness of breath and stridor. Cardiovascular: Negative. Negative for chest pain and palpitations. Endocrine: Negative. Musculoskeletal: Negative. Skin: Negative. Neurological: Negative. Negative for dizziness. Hematological: Negative. Psychiatric/Behavioral: Negative.         /71   Ht 4' 11\" (1.499 m)   Wt 135 lb (61.2 otherwise follow-up in 3 months. She is instructed to call with any new or worsening of symptoms prior to her next appointment.           ELIANE Miller, FNP-C  [facility], Nose and Throat    The information contained in this note has been dictated using drug and medical speech recognition software and may contain errors

## 2021-11-05 ASSESSMENT — ENCOUNTER SYMPTOMS
EYES NEGATIVE: 1
STRIDOR: 0
SHORTNESS OF BREATH: 0
SINUS PAIN: 0
SINUS PRESSURE: 0
RHINORRHEA: 0
RESPIRATORY NEGATIVE: 1

## 2022-01-11 ENCOUNTER — OFFICE VISIT (OUTPATIENT)
Dept: ENT CLINIC | Age: 82
End: 2022-01-11
Payer: MEDICARE

## 2022-01-11 VITALS — BODY MASS INDEX: 26.21 KG/M2 | WEIGHT: 130 LBS | HEIGHT: 59 IN

## 2022-01-11 DIAGNOSIS — H91.93 BILATERAL HEARING LOSS, UNSPECIFIED HEARING LOSS TYPE: ICD-10-CM

## 2022-01-11 DIAGNOSIS — J34.89 NASAL DRYNESS: ICD-10-CM

## 2022-01-11 DIAGNOSIS — H60.542 ECZEMATOID OTITIS EXTERNA OF LEFT EAR, UNSPECIFIED CHRONICITY: Primary | ICD-10-CM

## 2022-01-11 PROCEDURE — 4040F PNEUMOC VAC/ADMIN/RCVD: CPT | Performed by: NURSE PRACTITIONER

## 2022-01-11 PROCEDURE — 1123F ACP DISCUSS/DSCN MKR DOCD: CPT | Performed by: NURSE PRACTITIONER

## 2022-01-11 PROCEDURE — G8417 CALC BMI ABV UP PARAM F/U: HCPCS | Performed by: NURSE PRACTITIONER

## 2022-01-11 PROCEDURE — G8427 DOCREV CUR MEDS BY ELIG CLIN: HCPCS | Performed by: NURSE PRACTITIONER

## 2022-01-11 PROCEDURE — 1090F PRES/ABSN URINE INCON ASSESS: CPT | Performed by: NURSE PRACTITIONER

## 2022-01-11 PROCEDURE — 4130F TOPICAL PREP RX AOE: CPT | Performed by: NURSE PRACTITIONER

## 2022-01-11 PROCEDURE — 99213 OFFICE O/P EST LOW 20 MIN: CPT | Performed by: NURSE PRACTITIONER

## 2022-01-11 PROCEDURE — 1036F TOBACCO NON-USER: CPT | Performed by: NURSE PRACTITIONER

## 2022-01-11 PROCEDURE — G8400 PT W/DXA NO RESULTS DOC: HCPCS | Performed by: NURSE PRACTITIONER

## 2022-01-11 PROCEDURE — G8484 FLU IMMUNIZE NO ADMIN: HCPCS | Performed by: NURSE PRACTITIONER

## 2022-01-11 ASSESSMENT — ENCOUNTER SYMPTOMS
SHORTNESS OF BREATH: 0
RHINORRHEA: 0
SINUS PRESSURE: 0
EYES NEGATIVE: 1
STRIDOR: 0
RESPIRATORY NEGATIVE: 1
SINUS PAIN: 0

## 2022-01-11 NOTE — PROGRESS NOTES
[facility] Otolaryngology  Dr. Reynold Stovall. Joy Wilson. Ms.Ed        Patient Name:  Nohemy Allred  :  1940     CHIEF C/O:    Chief Complaint   Patient presents with    Follow-up     3mo f/u ear pain       HISTORY OBTAINED FROM:  patient    HISTORY OF PRESENT ILLNESS:       Julia Jacinto is a 78y.o. year old female, here today for follow up of ear pain and hearing aid problems. Last seen 3 months  Tympanograms were normal at that time  Denies any current pain or pressure in the ear  Continues to have fitment issues with her hearing aids, working with rep from company  Does continues to have some issues with her sinuses  Was placed on z-pito 1 week ago that resolved her symptoms  Is suffering from rhinorrhea currently, but is associated to recent injections in her eyes  No recent fevers  No sinus pain or pressure currently  No changes to her hearing. Past Medical History:   Diagnosis Date    Anxiety     no medications, becomes short of breath when upset/ anxious    Cancer ([address] 2013    uterine    DJD (degenerative joint disease) 2014    Rt. RICHARD 2014 ZANE Clayton MD    Hyperlipidemia     no meds    Hypertension     Irregular heart beat     1 episode while hospitalized for kidney stone    Osteoarthritis 2016    Lt. RICHARD 2016 ZANE Clayton MD    Preoperative clearance     Dr Marybeth Duque, medical for right hip arthroplasty     Renal calculi     for or 2015    Ureteral perforation secondary to stent manipulation 2015     Past Surgical History:   Procedure Laterality Date    CHOLECYSTECTOMY      HYSTERECTOMY      LITHOTRIPSY Left 2015    LEFT RENAL CALCULI    OTHER SURGICAL HISTORY  05-08-15    Cystoscopy retrograde pyelogram    SKIN BIOPSY      skin cancer nose    TOTAL HIP ARTHROPLASTY Right 2014    right hip ZANE Clayton MD    TOTAL HIP ARTHROPLASTY Left 2016    left hip ZANE Clayton MD       Current Outpatient Medications:     triamcinolone (KENALOG) 0.025 % cream, Apply Topically to both ears for itching 2-3 times per week as needed. Stop when symptoms resolve., Disp: 1 each, Rfl: 2    fluticasone (FLONASE) 50 MCG/ACT nasal spray, SHAKE LIQUID AND USE 2 SPRAYS IN EACH NOSTRIL DAILY, Disp: 48 g, Rfl: 1    vitamin D 25 MCG (1000 UT) CAPS, Take by mouth, Disp: , Rfl:     ondansetron (ZOFRAN ODT) 8 MG TBDP disintegrating tablet, Take 1 tablet by mouth every 8 hours as needed for Nausea, Disp: 12 tablet, Rfl: 0    tamsulosin (FLOMAX) 0.4 MG capsule, Take 1 capsule by mouth daily, Disp: 15 capsule, Rfl: 0    amLODIPine (NORVASC) 5 MG tablet, TK 1 T PO QD, Disp: , Rfl:     rivaroxaban (XARELTO) 10 MG TABS tablet, Take 10 mg by mouth, Disp: , Rfl:     digoxin (LANOXIN) 125 MCG tablet, Take 1 tablet by mouth daily, Disp: 30 tablet, Rfl: 3    Multiple Vitamins-Minerals (RA VISION-MARIA ELENA PRESERVE PO), Take by mouth daily LD 7/12/16, Disp: , Rfl:   Iodine, Other, Cefoxitin sodium in dextrose, Hycomine compound [pe-cpm-hydrocod-apap-caff], Iodine, and Mefoxin [cefoxitin]  Social History     Tobacco Use    Smoking status: Never Smoker    Smokeless tobacco: Never Used   Substance Use Topics    Alcohol use: Yes     Alcohol/week: 0.0 standard drinks     Comment: occassional     Drug use: No     Family History   Problem Relation Age of Onset    Kidney Disease Mother     Heart Disease Father        Review of Systems   Constitutional: Negative. Negative for activity change and appetite change. HENT: Positive for hearing loss. Negative for congestion, ear discharge, ear pain, postnasal drip, rhinorrhea, sinus pressure and sinus pain. Itching of ears   Eyes: Negative. Respiratory: Negative. Negative for shortness of breath and stridor. Cardiovascular: Negative. Negative for chest pain and palpitations. Endocrine: Negative. Musculoskeletal: Negative. Skin: Negative. Neurological: Negative. Negative for dizziness. Hematological: Negative. Psychiatric/Behavioral: Negative. Ht 4' 11\" (1.499 m)   Wt 130 lb (59 kg)   LMP  (LMP Unknown)   BMI 26.26 kg/m²   Physical Exam  Constitutional:       Appearance: Normal appearance. HENT:      Head: Normocephalic. Right Ear: Tympanic membrane, ear canal and external ear normal. Decreased hearing noted. Left Ear: Tympanic membrane, ear canal and external ear normal. Decreased hearing noted. Nose: Nose normal. No rhinorrhea. Right Turbinates: Not swollen or pale. Left Turbinates: Not swollen or pale. Mouth/Throat:      Lips: Pink. Mouth: Mucous membranes are moist.      Pharynx: Oropharynx is clear. Eyes:      Conjunctiva/sclera: Conjunctivae normal.      Pupils: Pupils are equal, round, and reactive to light. Cardiovascular:      Rate and Rhythm: Normal rate and regular rhythm. Pulses: Normal pulses. Pulmonary:      Effort: Pulmonary effort is normal. No respiratory distress. Breath sounds: No stridor. Musculoskeletal:         General: Normal range of motion. Cervical back: Normal range of motion. No rigidity. No muscular tenderness. Skin:     General: Skin is warm and dry. Neurological:      General: No focal deficit present. Mental Status: She is alert and oriented to person, place, and time. Psychiatric:         Mood and Affect: Mood normal.         Behavior: Behavior normal.         Thought Content: Thought content normal.         Judgment: Judgment normal.         IMPRESSION/PLAN:    Caitlyn was seen today for follow-up. Diagnoses and all orders for this visit:    Eczematoid otitis externa of left ear, unspecified chronicity    Bilateral hearing loss, unspecified hearing loss type    Nasal dryness      Recommended for patient to begin using nasal saline, 2 sprays each nostril 3-4 times daily for hydration. She is offered Astelin spray to be used as needed but declines at this time.   She will continue with Kenalog cream as

## 2022-05-26 ENCOUNTER — TELEPHONE (OUTPATIENT)
Dept: ADMINISTRATIVE | Age: 82
End: 2022-05-26

## 2022-05-27 ENCOUNTER — HOSPITAL ENCOUNTER (EMERGENCY)
Age: 82
Discharge: HOME | End: 2022-05-27
Payer: MEDICARE

## 2022-05-27 VITALS
TEMPERATURE: 98.6 F | OXYGEN SATURATION: 96 % | HEIGHT: 59 IN | SYSTOLIC BLOOD PRESSURE: 163 MMHG | RESPIRATION RATE: 20 BRPM | HEART RATE: 78 BPM | DIASTOLIC BLOOD PRESSURE: 78 MMHG | WEIGHT: 130 LBS | BODY MASS INDEX: 26.21 KG/M2

## 2022-05-27 DIAGNOSIS — H65.02 ACUTE SEROUS OTITIS MEDIA OF LEFT EAR, RECURRENCE NOT SPECIFIED: Primary | ICD-10-CM

## 2022-05-27 PROCEDURE — 99211 OFF/OP EST MAY X REQ PHY/QHP: CPT

## 2022-05-27 RX ORDER — AMOXICILLIN 500 MG/1
500 CAPSULE ORAL 3 TIMES DAILY
Qty: 21 CAPSULE | Refills: 0 | Status: SHIPPED | OUTPATIENT
Start: 2022-05-27 | End: 2022-06-03

## 2022-05-27 ASSESSMENT — PAIN - FUNCTIONAL ASSESSMENT: PAIN_FUNCTIONAL_ASSESSMENT: NONE - DENIES PAIN

## 2022-05-27 NOTE — ED PROVIDER NOTES
HPI:  5/27/22,   Time: 7:22 PM EDT         Tatum Whyte is a 78 y.o. female presenting to the ED for pain to the left ear, beginning yesterday ago. The complaint has been persistent, mild in severity, and worsened by nothing. States that she went to the audiologist a few days ago to have the ears cleaned and her hearing aids adjusted and states since that time she has had discomfort to the left ear and now feels as though it is swelled shut. States he is unable to get her hearing aid in the left ear due to discomfort and swelling. States she is having drainage and discharge from the left ear. ROS:   Pertinent positives and negatives are stated within HPI, all other systems reviewed and are negative.  --------------------------------------------- PAST HISTORY ---------------------------------------------  Past Medical History:  has a past medical history of Anxiety, Cancer ([facility] [address] DJD (degenerative joint disease), Hyperlipidemia, Hypertension, Irregular heart beat, Osteoarthritis, Preoperative clearance, Renal calculi, and Ureteral perforation secondary to stent manipulation. Past Surgical History:  has a past surgical history that includes Cholecystectomy; Hysterectomy; skin biopsy; other surgical history (05-08-15); Lithotripsy (Left, 8/05/2015); Total hip arthroplasty (Right, 06/02/2014); and Total hip arthroplasty (Left, 07/18/2016). Social History:  reports that she has never smoked. She has never used smokeless tobacco. She reports current alcohol use. She reports that she does not use drugs. Family History: family history includes Heart Disease in her father; Kidney Disease in her mother. The patients home medications have been reviewed.     Allergies: Iodine, Other, Cefoxitin sodium in dextrose, Hycomine compound [pe-cpm-hydrocod-apap-caff], Iodine, and Mefoxin [cefoxitin]    -------------------------------------------------- RESULTS -------------------------------------------------  All laboratory and radiology results have been personally reviewed by myself   LABS:  No results found for this visit on 05/27/22. RADIOLOGY:  Interpreted by Radiologist.  No orders to display       ------------------------- NURSING NOTES AND VITALS REVIEWED ---------------------------   The nursing notes within the ED encounter and vital signs as below have been reviewed. BP (!) 163/78   Pulse 78   Temp 98.6 °F (37 °C) (Infrared)   Resp 20   Ht 4' 11\" (1.499 m)   Wt 130 lb (59 kg)   LMP  (LMP Unknown)   SpO2 96%   BMI 26.26 kg/m²   Oxygen Saturation Interpretation: Normal      ---------------------------------------------------PHYSICAL EXAM--------------------------------------      Constitutional/General: Alert and oriented x3, well appearing, non toxic in NAD  Head: NC/AT  Eyes: PERRL, EOMI  Mouth: Oropharynx clear, handling secretions, no trismus, right tympanic membrane abnormal appearance with good cone of light. There is edema and visible exudate from the left auditory canal unable to visualize the tympanic membrane. Neck: Supple, full ROM, no meningeal signs  Pulmonary: Lungs clear to auscultation bilaterally, no wheezes, rales, or rhonchi. Not in respiratory distress  Cardiovascular:  Regular rate and rhythm, no murmurs, gallops, or rubs. 2+ distal pulses  Abdomen: Soft, non tender, non distended,   Extremities: Moves all extremities x 4. Warm and well perfused  Skin: warm and dry without rash  Neurologic: GCS 15,  Psych: Normal Affect      ------------------------------ ED COURSE/MEDICAL DECISION MAKING----------------------  Medications - No data to display      Medical Decision Making: Will be started on otic drops as well as systemic antibiotics and advised to follow-up with ENT. Counseling:    The emergency provider has spoken with the patient and discussed todays results, in addition to providing specific details for the plan of care and counseling regarding the diagnosis and prognosis. Questions are answered at this time and they are agreeable with the plan.      --------------------------------- IMPRESSION AND DISPOSITION ---------------------------------    IMPRESSION  1.  Acute serous otitis media of left ear, recurrence not specified        DISPOSITION  Disposition: Discharge to home  Patient condition is good                 Eben Bloom, PRIYANKA - EMETERIO  05/27/22 1923

## 2022-07-10 ENCOUNTER — HOSPITAL ENCOUNTER (EMERGENCY)
Age: 82
Discharge: HOME | End: 2022-07-10
Payer: MEDICARE

## 2022-07-10 VITALS
HEIGHT: 59 IN | HEART RATE: 68 BPM | SYSTOLIC BLOOD PRESSURE: 163 MMHG | TEMPERATURE: 97.9 F | OXYGEN SATURATION: 96 % | RESPIRATION RATE: 20 BRPM | BODY MASS INDEX: 26.21 KG/M2 | WEIGHT: 130 LBS | DIASTOLIC BLOOD PRESSURE: 80 MMHG

## 2022-07-10 DIAGNOSIS — L03.114 CELLULITIS OF LEFT UPPER EXTREMITY: Primary | ICD-10-CM

## 2022-07-10 PROCEDURE — 99211 OFF/OP EST MAY X REQ PHY/QHP: CPT

## 2022-07-10 RX ORDER — CIPROFLOXACIN/HYDROCORTISONE 0.2 %-1 %
3 SUSPENSION, DROPS(FINAL DOSAGE FORM)(ML) OTIC (EAR) 2 TIMES DAILY
Qty: 5 ML | Refills: 0 | Status: SHIPPED | OUTPATIENT
Start: 2022-07-10 | End: 2022-07-17

## 2022-07-10 RX ORDER — DOXYCYCLINE HYCLATE 100 MG
100 TABLET ORAL 2 TIMES DAILY
Qty: 20 TABLET | Refills: 0 | Status: SHIPPED | OUTPATIENT
Start: 2022-07-10 | End: 2022-07-20

## 2022-07-10 ASSESSMENT — PAIN - FUNCTIONAL ASSESSMENT: PAIN_FUNCTIONAL_ASSESSMENT: NONE - DENIES PAIN

## 2022-07-10 NOTE — ED PROVIDER NOTES
Department of Emergency Medicine  [address] [place] [address]  Provider Note  Admit Date/Time: 7/10/2022  2:42 PM  Room: 04/04  MRN: [id number]  Chief Complaint: Insect Bite (last night got bit on left arm   red swollen) and Ear Problem (states left ear draining   constant)       History of Present Illness   Source of history provided by:  Patient. History/Exam Limitations: None. Ignacio Denis is a 78 y.o. female with a history of atrial fibrillation on Xarelto. She has 2 complaints. She reports that she was stung by multiple bees in the left arm yesterday. The lesions were initially pruritic however become more erythematous and tender today. Denies any discharge or drainage. Denies any fevers or chills. Denies any nausea or vomiting. Is not diabetic. Additionally, she notes that she is had some increased drainage from her right ear with some discomfort over the last several days. Does wear a hearing aid. No aqueous exposures. ROS    Pertinent positives and negatives are stated within HPI, all other systems reviewed and are negative. Past Surgical History:   Procedure Laterality Date    CHOLECYSTECTOMY      HYSTERECTOMY (CERVIX STATUS UNKNOWN)      LITHOTRIPSY Left 8/05/2015    LEFT RENAL CALCULI    OTHER SURGICAL HISTORY  05-08-15    Cystoscopy retrograde pyelogram    SKIN BIOPSY      skin cancer nose    TOTAL HIP ARTHROPLASTY Right 06/02/2014    right hip ZANE Clayton MD    TOTAL HIP ARTHROPLASTY Left 07/18/2016    left hip ZANE Clayton MD   Social History:  reports that she has never smoked. She has never used smokeless tobacco. She reports current alcohol use. She reports that she does not use drugs. Family History: family history includes Heart Disease in her father; Kidney Disease in her mother.   Allergies: Iodine, Other, Cefoxitin sodium in dextrose, Hycomine compound [pe-cpm-hydrocod-apap-caff], Iodine, and Mefoxin [cefoxitin]    Physical Exam   Oxygen Saturation Interpretation: Normal.   ED Triage Vitals   BP Temp Temp Source Heart Rate Resp SpO2 Height Weight   07/10/22 1458 07/10/22 1450 07/10/22 1450 07/10/22 1450 07/10/22 1450 07/10/22 1450 07/10/22 1450 07/10/22 1450   (!) 163/80 97.9 °F (36.6 °C) Infrared 68 20 96 % 4' 11\" (1.499 m) 130 lb (59 kg)       Physical Exam  General: Vitals noted, no distress. Afebrile. EENT: Posterior oropharynx unremarkable. Right TM is unremarkable. The EAC is somewhat edematous and erythematous consistent with an otitis externa. Some mild tenderness of the tragus as well. No otitis media. No mastoiditis or malignant otitis. Cardiac: Regular, rate, rhythm, no murmur. Pulmonary: Lungs clear bilaterally with good aeration. No adventitious breath sounds. Abdomen: Soft, nonsurgical. Nontender. No peritoneal signs. Normoactive bowel sounds. Extremities: No peripheral edema. Negative Homans bilaterally, no cords. Neurovascularly intact throughout. Skin: There are 3 moderately sized erythematous lesions in the left upper extremity from the envenomation's. They are slightly tender and warm to touch. They are more erythematous than I would expect with a simple allergic reaction so do likely represent mild cellulitis. No foreign bodies. No fluctuance or drainable fluid collection. No lymphangitic streaking. No ecchymosis, bullae, or crepitance to suggest necrotizing fasciitis. Neuro: No gross neurologic deficits. Lab / Imaging Results   (All laboratory and radiology results have been personally reviewed by myself)  Labs:  No results found for this visit on 07/10/22. Imaging: All Radiology results interpreted by Radiologist unless otherwise noted.   No orders to display       ED Course / Medical Decision Making   Medications - No data to display       Consult(s):   None    Procedure(s):   None    Differential Diagnosis: Is extensive but includes cellulitis, lymphangitis, cutaneous abscess, retained foreign body, myositis, osteomyelitis, etc.    MDM:   This is a 78 y.o. female who presents with the above history and exam findings. Does appear to have an otitis externa on the right so will be home-going with Ciprodex otic. Does also have 3 areas of apparent cellulitis in the left upper extremity following recent hymenoptera envenomation. They are more tender and erythematous than I would expect with simple localized allergic reactions so will be home-going doxycycline as she apparently has a cephalosporin allergy. Counseling: I discussed the differential, results and discharge plan with the patient and/or family/friend/caregiver if present. I emphasized the importance of follow-up with the physician I referred them to in the timeframe recommended. I explained reasons for the patient to return to the Emergency Department. Additional verbal discharge instructions were also given and discussed with the patient to supplement those generated by the EMR. We also discussed medications that were prescribed (if any) including common side effects and interactions. The patient was advised to abstain from driving, operating heavy machinery or making significant decisions while taking medications such as opiates and muscle relaxers that may impair this. All questions were addressed. They understand return precautions and discharge instructions. The patient and/or family/friend/caregiver expressed understanding. Assessment      1. Cellulitis of left upper extremity      Plan   Discharge to home and advised to contact Magali Belcher MD  [address]  [address]  [phone]    In 2 days     Patient condition is good    New Medications     New Prescriptions    CIPROFLOXACIN-HYDROCORTISONE (CIPRO HC) 0.2-1 % OTIC SUSPENSION    Place 3 drops into the right ear 2 times daily for 7 days Use on affeted side only.     DOXYCYCLINE HYCLATE (VIBRA-TABS) 100 MG TABLET    Take 1 tablet by mouth 2 times daily for 10 days May substitute for Doxycycline Monohydrate     Electronically signed by TISHA Simms   DD: 7/10/22  **This report was transcribed using voice recognition software. Every effort was made to ensure accuracy; however, inadvertent computerized transcription errors may be present.   END OF ED PROVIDER NOTE          Levander Spurling [address] [place] [address]  07/10/22 4530

## 2022-07-14 ENCOUNTER — PROCEDURE VISIT (OUTPATIENT)
Dept: AUDIOLOGY | Age: 82
End: 2022-07-14
Payer: MEDICARE

## 2022-07-14 ENCOUNTER — OFFICE VISIT (OUTPATIENT)
Dept: ENT CLINIC | Age: 82
End: 2022-07-14
Payer: MEDICARE

## 2022-07-14 VITALS
DIASTOLIC BLOOD PRESSURE: 55 MMHG | HEART RATE: 73 BPM | WEIGHT: 129.7 LBS | HEIGHT: 59 IN | SYSTOLIC BLOOD PRESSURE: 138 MMHG | BODY MASS INDEX: 26.15 KG/M2

## 2022-07-14 DIAGNOSIS — H91.93 BILATERAL HEARING LOSS, UNSPECIFIED HEARING LOSS TYPE: ICD-10-CM

## 2022-07-14 DIAGNOSIS — R42 VERTIGO: ICD-10-CM

## 2022-07-14 DIAGNOSIS — H61.23 EXCESSIVE CERUMEN IN BOTH EAR CANALS: ICD-10-CM

## 2022-07-14 DIAGNOSIS — H66.92 ACUTE INFECTION OF LEFT EAR: Primary | ICD-10-CM

## 2022-07-14 DIAGNOSIS — H66.92 ACUTE EAR INFECTION, LEFT: ICD-10-CM

## 2022-07-14 PROCEDURE — 1090F PRES/ABSN URINE INCON ASSESS: CPT | Performed by: NURSE PRACTITIONER

## 2022-07-14 PROCEDURE — 92567 TYMPANOMETRY: CPT | Performed by: AUDIOLOGIST

## 2022-07-14 PROCEDURE — G8400 PT W/DXA NO RESULTS DOC: HCPCS | Performed by: NURSE PRACTITIONER

## 2022-07-14 PROCEDURE — 69210 REMOVE IMPACTED EAR WAX UNI: CPT | Performed by: NURSE PRACTITIONER

## 2022-07-14 PROCEDURE — 99213 OFFICE O/P EST LOW 20 MIN: CPT | Performed by: NURSE PRACTITIONER

## 2022-07-14 PROCEDURE — G8417 CALC BMI ABV UP PARAM F/U: HCPCS | Performed by: NURSE PRACTITIONER

## 2022-07-14 PROCEDURE — 1123F ACP DISCUSS/DSCN MKR DOCD: CPT | Performed by: NURSE PRACTITIONER

## 2022-07-14 PROCEDURE — G8427 DOCREV CUR MEDS BY ELIG CLIN: HCPCS | Performed by: NURSE PRACTITIONER

## 2022-07-14 PROCEDURE — 1036F TOBACCO NON-USER: CPT | Performed by: NURSE PRACTITIONER

## 2022-07-14 NOTE — PROGRESS NOTES
[facility] Otolaryngology  Dr. Harriet Shultz. Ceasar Dickson. Ms.Ed        Patient Name:  Shon Jaquez  :  1940     CHIEF C/O:    Chief Complaint   Patient presents with    Ear Drainage     R ear drainage clear    Dizziness     x 1 week since beginning drops that were prescribed by urgent care one week ago       HISTORY OBTAINED FROM:  patient    HISTORY OF PRESENT ILLNESS:       Suzzette Bumpers is a 78y.o. year old female, here today for follow up of right ear drainage and dizziness. Symptoms for 1 week   Drainage from right ear  States that she feels like her hearing aids make her ear drain more  Was recently seen at urgent care, started on drops for the right ear  States after starting drops began having symptoms of vertigo  Was also on an antibiotic for a skin infection  Stopped both medications and dizziness symptoms improved  No current ear pain or pressure  Feels like ears may need cleaned  No recent fevers  No sinus pain or pressure  No recent changes in hearing  Continues to have some issues with hearing aids, has followed up with audiologist.          Past Medical History:   Diagnosis Date    Anxiety     no medications, becomes short of breath when upset/ anxious    Cancer ([address]     uterine    DJD (degenerative joint disease) 2014    Rt. RICHARD 2014 ZANE Clayton MD    Hyperlipidemia     no meds    Hypertension     Irregular heart beat     1 episode while hospitalized for kidney stone    Osteoarthritis 2016    Lt. RICHARD 2016 ZANE Clayton MD    Preoperative clearance     Dr Uriel Sal, medical for right hip arthroplasty     Renal calculi     for or 2015    Ureteral perforation secondary to stent manipulation 2015     Past Surgical History:   Procedure Laterality Date    CHOLECYSTECTOMY      HYSTERECTOMY (CERVIX STATUS UNKNOWN)      LITHOTRIPSY Left 2015    LEFT RENAL CALCULI    OTHER SURGICAL HISTORY  05-08-15    Cystoscopy retrograde pyelogram    SKIN BIOPSY      skin cancer nose TOTAL HIP ARTHROPLASTY Right 06/02/2014    right hip ZANE Clayton MD    TOTAL HIP ARTHROPLASTY Left 07/18/2016    left hip ZANE Clayton MD       Current Outpatient Medications:     ciprofloxacin-hydrocortisone (CIPRO HC) 0.2-1 % otic suspension, Place 3 drops into the right ear 2 times daily for 7 days Use on affeted side only. , Disp: 5 mL, Rfl: 0    triamcinolone (KENALOG) 0.025 % cream, Apply Topically to both ears for itching 2-3 times per week as needed. Stop when symptoms resolve., Disp: 1 each, Rfl: 2    vitamin D 25 MCG (1000 UT) CAPS, Take by mouth, Disp: , Rfl:     amLODIPine (NORVASC) 5 MG tablet, TK 1 T PO QD, Disp: , Rfl:     rivaroxaban (XARELTO) 10 MG TABS tablet, Take 10 mg by mouth, Disp: , Rfl:     digoxin (LANOXIN) 125 MCG tablet, Take 1 tablet by mouth daily, Disp: 30 tablet, Rfl: 3    Multiple Vitamins-Minerals (RA VISION-MARIA ELENA PRESERVE PO), Take by mouth daily LD 7/12/16, Disp: , Rfl:     doxycycline hyclate (VIBRA-TABS) 100 MG tablet, Take 1 tablet by mouth 2 times daily for 10 days May substitute for Doxycycline Monohydrate (Patient not taking: Reported on 7/14/2022), Disp: 20 tablet, Rfl: 0    fluticasone (FLONASE) 50 MCG/ACT nasal spray, SHAKE LIQUID AND USE 2 SPRAYS IN EACH NOSTRIL DAILY (Patient not taking: Reported on 7/14/2022), Disp: 48 g, Rfl: 1    ondansetron (ZOFRAN ODT) 8 MG TBDP disintegrating tablet, Take 1 tablet by mouth every 8 hours as needed for Nausea (Patient not taking: Reported on 7/14/2022), Disp: 12 tablet, Rfl: 0    tamsulosin (FLOMAX) 0.4 MG capsule, Take 1 capsule by mouth daily (Patient not taking: Reported on 7/14/2022), Disp: 15 capsule, Rfl: 0  Iodine, Other, Cefoxitin sodium in dextrose, Hycomine compound [pe-cpm-hydrocod-apap-caff], Iodine, and Mefoxin [cefoxitin]  Social History     Tobacco Use    Smoking status: Never    Smokeless tobacco: Never   Substance Use Topics    Alcohol use:  Yes     Alcohol/week: 0.0 standard drinks     Comment: occassional     Drug warm and dry. Neurological:      General: No focal deficit present. Mental Status: She is alert and oriented to person, place, and time. Psychiatric:         Mood and Affect: Mood normal.         Behavior: Behavior normal.         Thought Content: Thought content normal.         Judgment: Judgment normal.       IMPRESSION/PLAN:  Cerumen removal     Auditory canal(s) both ears partially obstructed with cerumen. Cerumen was gently removed using soft plastic curette. Tympanic membranes are intact following the procedure. Auditory canals appear normal.          Caitlyn was seen today for ear drainage and dizziness. Diagnoses and all orders for this visit:    Acute infection of left ear  -     Tympanometry; Future    Excessive cerumen in both ear canals  -     MI REMOVAL IMPACTED CERUMEN INSTRUMENTATION UNILAT    Bilateral hearing loss, unspecified hearing loss type    Vertigo    Patient is seen and examined today for follow-up of right otitis externa. At this time upon exam there are no further signs of middle ear or external auditory canal infection. There is nonobstructing cerumen around the entire perimeter of both the left and right external auditory canals it was removed without difficulty to help from obstruction of her bilateral hearing aids. Patient is instructed to stop the supplied drops to her ear at this time as these seem to aggravate her symptoms of vertigo. Patient's audiologist was notified and most recent audiogram was performed in September 2021. Patient will be scheduled in 2 months for follow-up with full audiological exam and for repeat cerumen removal if necessary. She is instructed to call with any return of her vertigo symptoms or any other new symptoms. She agrees to this plan.       Sayda Johnson, MSN, FNP-C  [facility], Nose and Throat    The information contained in this note has been dictated using drug and medical speech recognition software and may

## 2022-07-14 NOTE — PROGRESS NOTES
This patient was referred for tympanometric testing by NIVIA Olguin due to a possible ear infection, per patient report. Tympanometry revealed normal middle ear peak pressure and compliance, bilaterally. ipsilateral acoustic reflexes were present, right ear and absent, left ear at 1000Hz. The results were reviewed with the patient. Recommendations for follow up will be made pending physician consult.     Travis Butler CCC/DEEDEE  Audiologist  [id number]  NPI#:  [id number]

## 2022-07-15 ASSESSMENT — ENCOUNTER SYMPTOMS
SHORTNESS OF BREATH: 0
SINUS PAIN: 0
EYES NEGATIVE: 1
RESPIRATORY NEGATIVE: 1
RHINORRHEA: 0
SINUS PRESSURE: 0
STRIDOR: 0

## 2022-07-16 ENCOUNTER — HOSPITAL ENCOUNTER (EMERGENCY)
Age: 82
Discharge: HOME | End: 2022-07-16
Payer: MEDICARE

## 2022-07-16 VITALS
DIASTOLIC BLOOD PRESSURE: 76 MMHG | BODY MASS INDEX: 26.21 KG/M2 | OXYGEN SATURATION: 98 % | HEART RATE: 74 BPM | WEIGHT: 130 LBS | TEMPERATURE: 98.1 F | HEIGHT: 59 IN | RESPIRATION RATE: 18 BRPM | SYSTOLIC BLOOD PRESSURE: 147 MMHG

## 2022-07-16 DIAGNOSIS — K11.20 SIALADENITIS: Primary | ICD-10-CM

## 2022-07-16 PROCEDURE — 99211 OFF/OP EST MAY X REQ PHY/QHP: CPT

## 2022-07-16 RX ORDER — AMOXICILLIN AND CLAVULANATE POTASSIUM 875; 125 MG/1; MG/1
1 TABLET, FILM COATED ORAL 2 TIMES DAILY
Qty: 14 TABLET | Refills: 0 | Status: SHIPPED | OUTPATIENT
Start: 2022-07-16 | End: 2022-07-23

## 2022-07-16 ASSESSMENT — PAIN SCALES - GENERAL: PAINLEVEL_OUTOF10: 5

## 2022-07-16 ASSESSMENT — PAIN DESCRIPTION - LOCATION: LOCATION: EAR

## 2022-07-16 ASSESSMENT — PAIN - FUNCTIONAL ASSESSMENT: PAIN_FUNCTIONAL_ASSESSMENT: 0-10

## 2022-07-16 NOTE — ED PROVIDER NOTES
Department of Emergency Medicine  [address] [place] [address]  Provider Note  Admit Date/Time: 2022 12:28 PM  Room:   NAME: Carlos Mueller  : 1940  MRN: [id number]     Chief Complaint:  Ear Injury (Has a lump on lower part of ear and chin wants ear checked  was at clinic  thurs to get it checked  )    History of Present Illness        Carlos Mueller is a 78 y.o. female who has a past medical history of:   Past Medical History:   Diagnosis Date    Anxiety     no medications, becomes short of breath when upset/ anxious    Cancer ([address] 2013    uterine    DJD (degenerative joint disease) 2014    Rt. RICHARD 2014 ZANE Clayton MD    Hyperlipidemia     no meds    Hypertension     Irregular heart beat     1 episode while hospitalized for kidney stone    Osteoarthritis 2016    Lt. RICHARD 2016 ZANE Clayton MD    Preoperative clearance     Dr Irene Candelario, medical for right hip arthroplasty     Renal calculi     for or 2015    Ureteral perforation secondary to stent manipulation 2015    presents to the urgent care center by private car for ration of a swelling and pain in front of her right ear. She had that same ear irrigated by her ENT a few days ago. She also was on eardrops for otitis externa which has resolved. She does not have a fever does not have sinus symptoms does not have a sore throat denying any dental pain or problems. Not complaining of any pain in the ear. ROS    Pertinent positives and negatives are stated within HPI, all other systems reviewed and are negative. Past Surgical History:   Procedure Laterality Date    CHOLECYSTECTOMY      HYSTERECTOMY (CERVIX STATUS UNKNOWN)      LITHOTRIPSY Left 2015    LEFT RENAL CALCULI    OTHER SURGICAL HISTORY  05-08-15    Cystoscopy retrograde pyelogram    SKIN BIOPSY      skin cancer nose    TOTAL HIP ARTHROPLASTY Right 2014    right hip ZANE Clayton MD    TOTAL HIP ARTHROPLASTY Left 2016 left hip ZANE Clayton MD   Social History:  reports that she has never smoked. She has never used smokeless tobacco. She reports current alcohol use. She reports that she does not use drugs. Family History: family history includes Heart Disease in her father; Kidney Disease in her mother. Allergies: Doxycycline, Iodine, Other, Cefoxitin sodium in dextrose, Hycomine compound [pe-cpm-hydrocod-apap-caff], Iodine, and Mefoxin [cefoxitin]    Physical Exam   Oxygen Saturation Interpretation: Normal.   ED Triage Vitals [07/16/22 1230]   BP Temp Temp src Heart Rate Resp SpO2 Height Weight   (!) 147/76 98.1 °F (36.7 °C) -- 74 18 98 % 4' 11\" (1.499 m) 130 lb (59 kg)       Physical Exam  Constitutional/General: Alert and oriented x3, well appearing, non toxic in NAD very hard of hearing even with hearing aids in. HEENT:  NC/NT. PERRLA,  Airway patent. No obvious dental decay no pharyngeal erythema no tenderness over the sinuses, right TM appears normal no signs of infection ear Canal clear left TM and ear canal are also normal.  She does have a palpable swelling in front of the right ear which appears to be a salivary gland problem. Neck: Supple, full ROM,   Respiratory:  Not in respiratory distress  CV:  Regular rate. Regular rhythm. Musculoskeletal: Moves all extremities x 4. Integument: skin warm and dry. No rashes. Lymphatic: no lymphadenopathy noted  Neurologic: GCS 15, no focal deficits,   Psychiatric: Normal Affect    Lab / Imaging Results   (All laboratory and radiology results have been personally reviewed by myself)  Labs:  No results found for this visit on 07/16/22. Imaging: All Radiology results interpreted by Radiologist unless otherwise noted. No orders to display       ED Course / Medical Decision Making   Medications - No data to display       Consult(s):   None      MDM:   Appears to be a salivary gland infection I did put her on Augmentin.   I asked her to follow-up with her ENT if no improvement drink plenty of fluids she can also try sour candy. Get reevaluated if anything worsens. Assessment      1. Sialadenitis      Plan   Discharge to home and advised to contact Josemanuel Thomas MD  [address] [place] [address]  [phone]    Schedule an appointment as soon as possible for a visit      Patient condition is good    New Medications     New Prescriptions    No medications on file     Electronically signed by PRIYANKA Strauss CNP   DD: 7/16/22  **This report was transcribed using voice recognition software. Every effort was made to ensure accuracy; however, inadvertent computerized transcription errors may be present.   END OF ED PROVIDER NOTE      PRIYANKA Strauss CNP  07/16/22 0639

## 2022-08-20 ENCOUNTER — HOSPITAL ENCOUNTER (EMERGENCY)
Age: 82
Discharge: HOME | End: 2022-08-20
Attending: EMERGENCY MEDICINE
Payer: MEDICARE

## 2022-08-20 ENCOUNTER — APPOINTMENT (OUTPATIENT)
Dept: CT IMAGING | Age: 82
End: 2022-08-20
Payer: MEDICARE

## 2022-08-20 VITALS
DIASTOLIC BLOOD PRESSURE: 80 MMHG | HEART RATE: 74 BPM | BODY MASS INDEX: 26.21 KG/M2 | RESPIRATION RATE: 18 BRPM | HEIGHT: 59 IN | SYSTOLIC BLOOD PRESSURE: 188 MMHG | TEMPERATURE: 98.2 F | WEIGHT: 130 LBS | OXYGEN SATURATION: 95 %

## 2022-08-20 DIAGNOSIS — S22.20XA CLOSED FRACTURE OF STERNUM, UNSPECIFIED PORTION OF STERNUM, INITIAL ENCOUNTER: Primary | ICD-10-CM

## 2022-08-20 LAB
ANION GAP SERPL CALCULATED.3IONS-SCNC: 12 MMOL/L (ref 7–16)
BUN BLDV-MCNC: 18 MG/DL (ref 6–23)
CALCIUM SERPL-MCNC: 9.7 MG/DL (ref 8.6–10.2)
CHLORIDE BLD-SCNC: 104 MMOL/L (ref 98–107)
CO2: 21 MMOL/L (ref 22–29)
CREAT SERPL-MCNC: 0.8 MG/DL (ref 0.5–1)
GFR AFRICAN AMERICAN: >60
GFR NON-AFRICAN AMERICAN: >60 ML/MIN/1.73
GLUCOSE BLD-MCNC: 84 MG/DL (ref 74–99)
HCT VFR BLD CALC: 47.7 % (ref 34–48)
HEMOGLOBIN: 15.5 G/DL (ref 11.5–15.5)
MCH RBC QN AUTO: 30.7 PG (ref 26–35)
MCHC RBC AUTO-ENTMCNC: 32.5 % (ref 32–34.5)
MCV RBC AUTO: 94.5 FL (ref 80–99.9)
PDW BLD-RTO: 14 FL (ref 11.5–15)
PLATELET # BLD: 174 E9/L (ref 130–450)
PMV BLD AUTO: 9.9 FL (ref 7–12)
POTASSIUM SERPL-SCNC: 4.5 MMOL/L (ref 3.5–5)
RBC # BLD: 5.05 E12/L (ref 3.5–5.5)
SODIUM BLD-SCNC: 137 MMOL/L (ref 132–146)
TROPONIN, HIGH SENSITIVITY: 23 NG/L (ref 0–9)
WBC # BLD: 8.7 E9/L (ref 4.5–11.5)

## 2022-08-20 PROCEDURE — 99284 EMERGENCY DEPT VISIT MOD MDM: CPT

## 2022-08-20 PROCEDURE — 96374 THER/PROPH/DIAG INJ IV PUSH: CPT

## 2022-08-20 PROCEDURE — 6360000002 HC RX W HCPCS: Performed by: EMERGENCY MEDICINE

## 2022-08-20 PROCEDURE — 71250 CT THORAX DX C-: CPT

## 2022-08-20 PROCEDURE — 85027 COMPLETE CBC AUTOMATED: CPT

## 2022-08-20 PROCEDURE — 6370000000 HC RX 637 (ALT 250 FOR IP): Performed by: EMERGENCY MEDICINE

## 2022-08-20 PROCEDURE — 84484 ASSAY OF TROPONIN QUANT: CPT

## 2022-08-20 PROCEDURE — 80048 BASIC METABOLIC PNL TOTAL CA: CPT

## 2022-08-20 PROCEDURE — 93005 ELECTROCARDIOGRAM TRACING: CPT | Performed by: EMERGENCY MEDICINE

## 2022-08-20 RX ORDER — HYDRALAZINE HYDROCHLORIDE 20 MG/ML
10 INJECTION INTRAMUSCULAR; INTRAVENOUS ONCE
Status: COMPLETED | OUTPATIENT
Start: 2022-08-20 | End: 2022-08-20

## 2022-08-20 RX ORDER — ACETAMINOPHEN 325 MG/1
650 TABLET ORAL ONCE
Status: COMPLETED | OUTPATIENT
Start: 2022-08-20 | End: 2022-08-20

## 2022-08-20 RX ORDER — IBUPROFEN 400 MG/1
400 TABLET ORAL ONCE
Status: COMPLETED | OUTPATIENT
Start: 2022-08-20 | End: 2022-08-20

## 2022-08-20 RX ADMIN — IBUPROFEN 400 MG: 400 TABLET, FILM COATED ORAL at 16:48

## 2022-08-20 RX ADMIN — HYDRALAZINE HYDROCHLORIDE 10 MG: 20 INJECTION, SOLUTION INTRAMUSCULAR; INTRAVENOUS at 16:33

## 2022-08-20 RX ADMIN — ACETAMINOPHEN 650 MG: 325 TABLET ORAL at 16:47

## 2022-08-20 ASSESSMENT — PAIN DESCRIPTION - LOCATION
LOCATION: STERNUM
LOCATION: CHEST

## 2022-08-20 ASSESSMENT — PAIN DESCRIPTION - PAIN TYPE
TYPE: ACUTE PAIN

## 2022-08-20 ASSESSMENT — ENCOUNTER SYMPTOMS
VOMITING: 0
FACIAL SWELLING: 0
BACK PAIN: 0
NAUSEA: 0
SHORTNESS OF BREATH: 0
ABDOMINAL PAIN: 0

## 2022-08-20 ASSESSMENT — PAIN DESCRIPTION - DESCRIPTORS
DESCRIPTORS: ACHING

## 2022-08-20 ASSESSMENT — PAIN SCALES - GENERAL
PAINLEVEL_OUTOF10: 10
PAINLEVEL_OUTOF10: 4
PAINLEVEL_OUTOF10: 8

## 2022-08-20 ASSESSMENT — PAIN DESCRIPTION - FREQUENCY
FREQUENCY: INTERMITTENT

## 2022-08-20 ASSESSMENT — PAIN - FUNCTIONAL ASSESSMENT
PAIN_FUNCTIONAL_ASSESSMENT: 0-10

## 2022-08-20 ASSESSMENT — LIFESTYLE VARIABLES: HOW OFTEN DO YOU HAVE A DRINK CONTAINING ALCOHOL: NEVER

## 2022-08-20 NOTE — ED PROVIDER NOTES
Patient had a mechanical fall yesterday. She states she came down on her chest, left forearm and left knee. She states she did not hit her head and has no headache or neck pain. She states the only discomfort is in her anterior chest wall. No SOB however there is increased pain with deep inspiration. No abdominal pain, n/v or urinary symptoms. She reports no pain with ambulation. Patient is on Xarelto. The history is provided by the patient. Fall  The accident occurred Yesterday. The fall occurred while walking. Distance fallen: tumbled on steps. She landed on A hard floor. The volume of blood lost was minimal (skin tear to the left forearm). The point of impact was the left knee (anterior chest wall). Pain location: anterior chest wall. Pertinent negatives include no fever, no numbness, no abdominal pain, no nausea, no vomiting, no hematuria, no headaches and no loss of consciousness. The symptoms are aggravated by pressure on the injury. She has tried nothing for the symptoms. Review of Systems   Constitutional: Negative. Negative for activity change, appetite change, fatigue and fever. HENT: Negative. Negative for facial swelling. Eyes:  Negative for visual disturbance. Respiratory:  Negative for shortness of breath. Cardiovascular:  Positive for chest pain. Negative for palpitations and leg swelling. Gastrointestinal:  Negative for abdominal pain, nausea and vomiting. Genitourinary:  Negative for flank pain and hematuria. Musculoskeletal:  Negative for arthralgias, back pain, gait problem, joint swelling, myalgias, neck pain and neck stiffness. Skin:  Positive for wound. Neurological:  Negative for loss of consciousness, syncope, light-headedness, numbness and headaches. Hematological:  Bruises/bleeds easily. Physical Exam  Vitals and nursing note reviewed. Constitutional:       General: She is not in acute distress. Appearance: Normal appearance.  She is well-developed and normal weight. She is not ill-appearing or toxic-appearing. HENT:      Head: Normocephalic and atraumatic. Eyes:      Pupils: Pupils are equal, round, and reactive to light. Cardiovascular:      Rate and Rhythm: Normal rate and regular rhythm. Pulses: Normal pulses. Heart sounds: Normal heart sounds. No murmur heard. Pulmonary:      Effort: Pulmonary effort is normal. No respiratory distress. Breath sounds: Normal breath sounds. No wheezing or rales. Chest:      Chest wall: Tenderness (along the sternum) present. Abdominal:      General: Bowel sounds are normal.      Palpations: Abdomen is soft. Tenderness: There is no abdominal tenderness. There is no right CVA tenderness, left CVA tenderness, guarding or rebound. Musculoskeletal:         General: No swelling or tenderness. Normal range of motion. Cervical back: Normal range of motion and neck supple. No rigidity or tenderness. Right lower leg: No edema. Left lower leg: No edema. Skin:     General: Skin is warm and dry. Findings: No bruising. Comments: Small nonbleeding skin tear of the left forearm. Abrasion of the left knee. Neurological:      General: No focal deficit present. Mental Status: She is alert and oriented to person, place, and time. Mental status is at baseline. Cranial Nerves: No cranial nerve deficit. Motor: No weakness. Coordination: Coordination normal.      Gait: Gait normal.   Psychiatric:         Mood and Affect: Mood normal.         Behavior: Behavior normal.         Thought Content:  Thought content normal.         Judgment: Judgment normal.       Procedures    MDM       EKG Interpretation    Interpreted by emergency department physician    Rhythm: normal sinus   Rate: normal  Axis: LAD  Ectopy: none  Conduction: normal  ST Segments: no acute change  T Waves: no acute change  Q Waves: II, III, and aVf    Clinical Impression: non-specific fL    MCH 30.7 26.0 - 35.0 pg    MCHC 32.5 32.0 - 34.5 %    RDW 14.0 11.5 - 15.0 fL    Platelets 386 133 - 433 E9/L    MPV 9.9 7.0 - 12.0 fL   Troponin   Result Value Ref Range    Troponin, High Sensitivity 23 (H) 0 - 9 ng/L       Radiology:  CT CHEST WO CONTRAST   Final Result   Mild cortical irregularity of the mid body of the sternum which could   represent a nondisplaced sternal fracture. Clinical assessment is   recommended. Partial visualization of an abdominal aortic aneurysm. Consider dedicated   CTA of the abdomen and pelvis.             ------------------------- NURSING NOTES AND VITALS REVIEWED ---------------------------  Date / Time Roomed:  8/20/2022 12:58 PM  ED Bed Assignment:  16/16    The nursing notes within the ED encounter and vital signs as below have been reviewed. BP (!) 235/100   Pulse 75   Temp 98.2 °F (36.8 °C) (Oral)   Resp 20   Ht 4' 11\" (1.499 m)   Wt 130 lb (59 kg)   LMP  (LMP Unknown)   SpO2 95%   BMI 26.26 kg/m²   Oxygen Saturation Interpretation: Normal      ------------------------------------------ PROGRESS NOTES ------------------------------------------  I have spoken with the patient and discussed todays results, in addition to providing specific details for the plan of care and counseling regarding the diagnosis and prognosis. Their questions are answered at this time and they are agreeable with the plan. I discussed at length with them reasons for immediate return here for re evaluation. They will followup with primary care by calling their office tomorrow. Case discussed with Dr Cathy March. Patient stable for discharge as there are no EKG changes and no elevated troponins. Tylenol for pain.  --------------------------------- ADDITIONAL PROVIDER NOTES ---------------------------------  At this time the patient is without objective evidence of an acute process requiring hospitalization or inpatient management.   They have remained hemodynamically stable throughout their entire ED visit and are stable for discharge with outpatient follow-up. The plan has been discussed in detail and they are aware of the specific conditions for emergent return, as well as the importance of follow-up. New Prescriptions    No medications on file       Diagnosis:  1. Closed fracture of sternum, unspecified portion of sternum, initial encounter        Disposition:  Patient's disposition: Discharge to home  Patient's condition is stable.          Tequila Cortez DO  08/20/22 9911

## 2022-08-21 LAB
EKG ATRIAL RATE: 75 BPM
EKG P AXIS: 54 DEGREES
EKG P-R INTERVAL: 192 MS
EKG Q-T INTERVAL: 374 MS
EKG QRS DURATION: 70 MS
EKG QTC CALCULATION (BAZETT): 417 MS
EKG R AXIS: -48 DEGREES
EKG T AXIS: 90 DEGREES
EKG VENTRICULAR RATE: 75 BPM

## 2022-08-24 ENCOUNTER — TELEPHONE (OUTPATIENT)
Dept: ENT CLINIC | Age: 82
End: 2022-08-24

## 2022-08-24 ENCOUNTER — TELEPHONE (OUTPATIENT)
Dept: AUDIOLOGY | Age: 82
End: 2022-08-24

## 2022-08-24 NOTE — TELEPHONE ENCOUNTER
Spoke with patient regarding hearing aids. She reported she lost one hearing aid, which she purchased at another office. She asked if she is able to rent a hearing aid at our office. Told her we are not able to do that. Patient asked about seeing Mirian Woodard NP for appointment sooner than her schedule appt at end of September. Told her ENT office can see if that is possible. Patient also asked about OTC hearing aids. Told her they are available at drugstores, advised her to call ahead to see if they are in stock.

## 2022-08-24 NOTE — TELEPHONE ENCOUNTER
Pt called in wanting to know if she can come in sooner than her appt on 9/29/22? She lost one of her hearing aid. She also wants to know if the office has hearing aids that she can either rent or borrow? Please, advise. Caitlyn can be reached at [phone] Thank you. She will be unavailable from 11:30-1:00.

## 2022-09-02 ENCOUNTER — HOSPITAL ENCOUNTER (OUTPATIENT)
Dept: ULTRASOUND IMAGING | Age: 82
Discharge: HOME | End: 2022-09-02
Payer: MEDICARE

## 2022-09-02 ENCOUNTER — HOSPITAL ENCOUNTER (OUTPATIENT)
Dept: NON INVASIVE DIAGNOSTICS | Age: 82
Discharge: HOME | End: 2022-09-02
Payer: MEDICARE

## 2022-09-02 DIAGNOSIS — I71.40 ABDOMINAL AORTIC ANEURYSM WITHOUT RUPTURE: ICD-10-CM

## 2022-09-02 PROCEDURE — 93225 XTRNL ECG REC<48 HRS REC: CPT

## 2022-09-02 PROCEDURE — 76775 US EXAM ABDO BACK WALL LIM: CPT

## 2022-09-02 PROCEDURE — 93226 XTRNL ECG REC<48 HR SCAN A/R: CPT

## 2022-09-29 ENCOUNTER — PROCEDURE VISIT (OUTPATIENT)
Dept: AUDIOLOGY | Age: 82
End: 2022-09-29
Payer: MEDICARE

## 2022-09-29 ENCOUNTER — OFFICE VISIT (OUTPATIENT)
Dept: ENT CLINIC | Age: 82
End: 2022-09-29
Payer: MEDICARE

## 2022-09-29 VITALS — SYSTOLIC BLOOD PRESSURE: 169 MMHG | HEART RATE: 76 BPM | DIASTOLIC BLOOD PRESSURE: 82 MMHG

## 2022-09-29 DIAGNOSIS — H93.8X1 SENSATION OF FULLNESS IN RIGHT EAR: Primary | ICD-10-CM

## 2022-09-29 DIAGNOSIS — H60.542 ECZEMATOID OTITIS EXTERNA OF LEFT EAR, UNSPECIFIED CHRONICITY: ICD-10-CM

## 2022-09-29 DIAGNOSIS — H93.8X1 PRESSURE SENSATION IN RIGHT EAR: ICD-10-CM

## 2022-09-29 DIAGNOSIS — H69.81 DYSFUNCTION OF RIGHT EUSTACHIAN TUBE: ICD-10-CM

## 2022-09-29 DIAGNOSIS — H91.93 BILATERAL HEARING LOSS, UNSPECIFIED HEARING LOSS TYPE: Primary | ICD-10-CM

## 2022-09-29 DIAGNOSIS — H65.91 RIGHT SEROUS OTITIS MEDIA, UNSPECIFIED CHRONICITY: ICD-10-CM

## 2022-09-29 PROCEDURE — 1090F PRES/ABSN URINE INCON ASSESS: CPT | Performed by: NURSE PRACTITIONER

## 2022-09-29 PROCEDURE — 1036F TOBACCO NON-USER: CPT | Performed by: NURSE PRACTITIONER

## 2022-09-29 PROCEDURE — 92567 TYMPANOMETRY: CPT | Performed by: AUDIOLOGIST

## 2022-09-29 PROCEDURE — G8427 DOCREV CUR MEDS BY ELIG CLIN: HCPCS | Performed by: NURSE PRACTITIONER

## 2022-09-29 PROCEDURE — 1123F ACP DISCUSS/DSCN MKR DOCD: CPT | Performed by: NURSE PRACTITIONER

## 2022-09-29 PROCEDURE — 4130F TOPICAL PREP RX AOE: CPT | Performed by: NURSE PRACTITIONER

## 2022-09-29 PROCEDURE — G8417 CALC BMI ABV UP PARAM F/U: HCPCS | Performed by: NURSE PRACTITIONER

## 2022-09-29 PROCEDURE — G8400 PT W/DXA NO RESULTS DOC: HCPCS | Performed by: NURSE PRACTITIONER

## 2022-09-29 PROCEDURE — 99213 OFFICE O/P EST LOW 20 MIN: CPT | Performed by: NURSE PRACTITIONER

## 2022-09-29 RX ORDER — FLUTICASONE PROPIONATE 50 MCG
2 SPRAY, SUSPENSION (ML) NASAL DAILY
Qty: 16 G | Refills: 1 | Status: SHIPPED | OUTPATIENT
Start: 2022-09-29

## 2022-09-29 ASSESSMENT — ENCOUNTER SYMPTOMS
RESPIRATORY NEGATIVE: 1
STRIDOR: 0
SHORTNESS OF BREATH: 0
EYES NEGATIVE: 1
RHINORRHEA: 0
SINUS PRESSURE: 0
SINUS PAIN: 0

## 2022-09-29 NOTE — PROGRESS NOTES
This patient was referred for tympanometric testing by NIVIA Valencia due to ear pressure/fullness, right ear. Tympanometry revealed a flat tympanogram,  right ear and normal middle eaar peak pressure, left ear. Ipsilateral acoustic reflexes were absent, bilaterally at 1000Hz. The results were reviewed with the patient. Recommendations for follow up will be made pending physician consult.     Maribel Felder CCC/DEEDEE  Audiologist  [id number]  NPI#:  [id number]      Electronically signed by Janett Sanchez on 9/29/2022 at 12:16 PM

## 2022-09-29 NOTE — PROGRESS NOTES
[address] [facility] Otolaryngology  Dr. Leonidas Isabel. Clair Nagy. Ms.Ed        Patient Name:  Marbella Serrato  :  1940     CHIEF C/O:    Chief Complaint   Patient presents with    Follow-up     Pt states ears are not doing good. Pt is having a hard time hearing. HISTORY OBTAINED FROM:  patient    HISTORY OF PRESENT ILLNESS:       Wilfrido Baptiste is a 78y.o. year old female, here today for follow up of hearing loss. Last seen 2 months ago  Was due for her yearly audio evaluation today  Patient had tympanogram which revealed type B flat curve in the right ear. Patient states she has recently had to send both of her hearing aids in for repair and is trying to use an over-the-counter product   she denies any ear pain or pressure with some difficulty. She denies any sinus congestion, rhinorrhea, postnasal drainage  She continues to lose Kenalog cream in both ears as needed for itching and dry skin. She denies any new dizziness or tinnitus      Past Medical History:   Diagnosis Date    Anxiety     no medications, becomes short of breath when upset/ anxious    Cancer ([address] 2013    uterine    DJD (degenerative joint disease) 2014    Rt. RICHARD 2014 ZANE Miranda MD    History of Holter monitoring 2022    Hyperlipidemia     no meds    Hypertension     Irregular heart beat     1 episode while hospitalized for kidney stone    Osteoarthritis 2016    Lt. RICHARD 2016 ZANE Clayton MD    Preoperative clearance     Dr Geena Webb, medical for right hip arthroplasty     Renal calculi     for or 2015    Ureteral perforation secondary to stent manipulation 2015     Past Surgical History:   Procedure Laterality Date    CHOLECYSTECTOMY      HYSTERECTOMY (CERVIX STATUS UNKNOWN)      LITHOTRIPSY Left 2015    LEFT RENAL CALCULI    OTHER SURGICAL HISTORY  05-08-15    Cystoscopy retrograde pyelogram    SKIN BIOPSY      skin cancer nose    TOTAL HIP ARTHROPLASTY Right 2014    right hip ZANE Clayton MD    TOTAL HIP ARTHROPLASTY Left 07/18/2016    left hip ZANE Clayton MD       Current Outpatient Medications:     triamcinolone (KENALOG) 0.025 % cream, Apply Topically to both ears for itching 2-3 times per week as needed. Stop when symptoms resolve. (Patient taking differently: Apply Topically to both ears for itching 2-3 times per week as needed. Stop when symptoms resolve.), Disp: 1 each, Rfl: 2    vitamin D 25 MCG (1000 UT) CAPS, Take by mouth, Disp: , Rfl:     ondansetron (ZOFRAN ODT) 8 MG TBDP disintegrating tablet, Take 1 tablet by mouth every 8 hours as needed for Nausea, Disp: 12 tablet, Rfl: 0    tamsulosin (FLOMAX) 0.4 MG capsule, Take 1 capsule by mouth daily, Disp: 15 capsule, Rfl: 0    amLODIPine (NORVASC) 5 MG tablet, TK 1 T PO QD, Disp: , Rfl:     rivaroxaban (XARELTO) 10 MG TABS tablet, Take 10 mg by mouth, Disp: , Rfl:     digoxin (LANOXIN) 125 MCG tablet, Take 1 tablet by mouth daily, Disp: 30 tablet, Rfl: 3    Multiple Vitamins-Minerals (RA VISION-MARIA ELENA PRESERVE PO), Take by mouth daily LD 7/12/16, Disp: , Rfl:   Doxycycline, Iodine, Other, Cefoxitin sodium in dextrose, Hycomine compound [pe-cpm-hydrocod-apap-caff], Iodine, and Mefoxin [cefoxitin]  Social History     Tobacco Use    Smoking status: Never    Smokeless tobacco: Never   Vaping Use    Vaping Use: Never used   Substance Use Topics    Alcohol use: Yes     Alcohol/week: 0.0 standard drinks     Comment: occassional     Drug use: No     Family History   Problem Relation Age of Onset    Kidney Disease Mother     Heart Disease Father        Review of Systems   Constitutional: Negative. Negative for activity change and appetite change. HENT:  Positive for hearing loss. Negative for congestion, ear discharge, ear pain, postnasal drip, rhinorrhea, sinus pressure and sinus pain. Eyes: Negative. Respiratory: Negative. Negative for shortness of breath and stridor. Cardiovascular: Negative. Negative for chest pain and palpitations.    Endocrine: Negative. Musculoskeletal: Negative. Skin: Negative. Neurological:  Negative for dizziness. Hematological: Negative. Psychiatric/Behavioral: Negative. BP (!) 169/82 (Site: Right Upper Arm, Position: Sitting, Cuff Size: Medium Adult)   Pulse 76   LMP  (LMP Unknown)   Physical Exam  Constitutional:       Appearance: Normal appearance. HENT:      Head: Normocephalic. Right Ear: Ear canal and external ear normal. Decreased hearing noted. A middle ear effusion is present. Left Ear: Tympanic membrane, ear canal and external ear normal. Decreased hearing noted. Ears:        Nose: Nose normal. No rhinorrhea. Right Turbinates: Not swollen or pale. Left Turbinates: Not swollen or pale. Mouth/Throat:      Lips: Pink. Mouth: Mucous membranes are moist.      Pharynx: Oropharynx is clear. Eyes:      Conjunctiva/sclera: Conjunctivae normal.      Pupils: Pupils are equal, round, and reactive to light. Cardiovascular:      Rate and Rhythm: Normal rate and regular rhythm. Pulses: Normal pulses. Pulmonary:      Effort: Pulmonary effort is normal. No respiratory distress. Breath sounds: No stridor. Musculoskeletal:         General: Normal range of motion. Cervical back: Normal range of motion. No rigidity. No muscular tenderness. Skin:     General: Skin is warm and dry. Neurological:      General: No focal deficit present. Mental Status: She is alert and oriented to person, place, and time. Psychiatric:         Mood and Affect: Mood normal.         Behavior: Behavior normal.         Thought Content: Thought content normal.         Judgment: Judgment normal.         Tympanogram reviewed with patient. Reveals type Flat curve in the right ear, with type A curve in the left ear. IMPRESSION/PLAN:    Mario Galdamez was seen today for follow-up.     Diagnoses and all orders for this visit:    Bilateral hearing loss, unspecified hearing loss

## 2022-11-09 ENCOUNTER — FOLLOWUP TELEPHONE ENCOUNTER (OUTPATIENT)
Dept: AUDIOLOGY | Age: 82
End: 2022-11-09

## 2022-11-09 NOTE — PROGRESS NOTES
The patient called and lvm at [facility] audiology regarding her hearing. Phone call returned. Spoke to the patient and she noted that she is having a lot of difficulty hearing and she is very frustrated with her hearing aids. She noted that her hearing aid  is not working right now. She has an appointment tomorrow with ENT and audiology. Confirmed her appointment. Will send note to audiologist covering [facility] tomorrow.    Electronically signed by Janett Dill on 11/9/2022 at 5:14 PM

## 2022-11-10 ENCOUNTER — OFFICE VISIT (OUTPATIENT)
Dept: ENT CLINIC | Age: 82
End: 2022-11-10
Payer: MEDICARE

## 2022-11-10 ENCOUNTER — PROCEDURE VISIT (OUTPATIENT)
Dept: AUDIOLOGY | Age: 82
End: 2022-11-10
Payer: MEDICARE

## 2022-11-10 VITALS
WEIGHT: 131 LBS | HEIGHT: 59 IN | DIASTOLIC BLOOD PRESSURE: 85 MMHG | BODY MASS INDEX: 26.41 KG/M2 | SYSTOLIC BLOOD PRESSURE: 167 MMHG | HEART RATE: 73 BPM

## 2022-11-10 DIAGNOSIS — H90.3 SENSORINEURAL HEARING LOSS (SNHL) OF BOTH EARS: ICD-10-CM

## 2022-11-10 DIAGNOSIS — H69.81 DYSFUNCTION OF RIGHT EUSTACHIAN TUBE: Primary | ICD-10-CM

## 2022-11-10 DIAGNOSIS — H90.3 SENSORINEURAL HEARING LOSS, BILATERAL: ICD-10-CM

## 2022-11-10 DIAGNOSIS — H69.83 CHRONIC DYSFUNCTION OF BOTH EUSTACHIAN TUBES: Primary | ICD-10-CM

## 2022-11-10 PROCEDURE — 1123F ACP DISCUSS/DSCN MKR DOCD: CPT | Performed by: NURSE PRACTITIONER

## 2022-11-10 PROCEDURE — 99213 OFFICE O/P EST LOW 20 MIN: CPT | Performed by: NURSE PRACTITIONER

## 2022-11-10 PROCEDURE — G8400 PT W/DXA NO RESULTS DOC: HCPCS | Performed by: NURSE PRACTITIONER

## 2022-11-10 PROCEDURE — G8484 FLU IMMUNIZE NO ADMIN: HCPCS | Performed by: NURSE PRACTITIONER

## 2022-11-10 PROCEDURE — G8417 CALC BMI ABV UP PARAM F/U: HCPCS | Performed by: NURSE PRACTITIONER

## 2022-11-10 PROCEDURE — 1090F PRES/ABSN URINE INCON ASSESS: CPT | Performed by: NURSE PRACTITIONER

## 2022-11-10 PROCEDURE — 92557 COMPREHENSIVE HEARING TEST: CPT | Performed by: AUDIOLOGIST

## 2022-11-10 PROCEDURE — 92567 TYMPANOMETRY: CPT | Performed by: AUDIOLOGIST

## 2022-11-10 PROCEDURE — G8427 DOCREV CUR MEDS BY ELIG CLIN: HCPCS | Performed by: NURSE PRACTITIONER

## 2022-11-10 PROCEDURE — 3078F DIAST BP <80 MM HG: CPT | Performed by: NURSE PRACTITIONER

## 2022-11-10 PROCEDURE — 1036F TOBACCO NON-USER: CPT | Performed by: NURSE PRACTITIONER

## 2022-11-10 PROCEDURE — 3074F SYST BP LT 130 MM HG: CPT | Performed by: NURSE PRACTITIONER

## 2022-11-10 ASSESSMENT — ENCOUNTER SYMPTOMS
EYES NEGATIVE: 1
SHORTNESS OF BREATH: 0
STRIDOR: 0
RHINORRHEA: 0
SINUS PRESSURE: 0
RESPIRATORY NEGATIVE: 1
SINUS PAIN: 0

## 2022-11-10 NOTE — PROGRESS NOTES
[facility] Otolaryngology  Dr. Mirtha Johnson. Iram Haque. Ms.Ed        Patient Name:  Marisabel Morris  :  1940     CHIEF C/O:    Chief Complaint   Patient presents with    Follow-up     6 wk hearing loss       HISTORY OBTAINED FROM:  patient    HISTORY OF PRESENT ILLNESS:       Liberty Urena is a 78y.o. year old female, here today for follow up of eustachian tube dysfunction, right middle ear effusion, and hearing loss. Patient was last seen 6 weeks ago and found to have a right middle ear effusion when coming for an audio evaluation. She was placed on Flonase which she has used intermittently over the past 6 weeks but denies any noticed changes. She denies any current pain or pressure in the sinuses. She denies any current rhinorrhea or postnasal drainage. She continues to have problems with her hearing aids and states her  is not currently working. She does have over-the-counter hearing aid also not functioning well at this time. Past Medical History:   Diagnosis Date    Anxiety     no medications, becomes short of breath when upset/ anxious    Cancer ([address] 2013    uterine    DJD (degenerative joint disease) 2014    Rt. RICHARD 2014 ZANE Henderson MD    History of Holter monitoring 2022    Hyperlipidemia     no meds    Hypertension     Irregular heart beat     1 episode while hospitalized for kidney stone    Osteoarthritis 2016    Lt. RICHARD 2016 ZANE Clayton MD    Preoperative clearance     Dr Mark Bettencourt, medical for right hip arthroplasty     Renal calculi     for or 2015    Ureteral perforation secondary to stent manipulation 2015     Past Surgical History:   Procedure Laterality Date    CHOLECYSTECTOMY      HYSTERECTOMY (CERVIX STATUS UNKNOWN)      LITHOTRIPSY Left 2015    LEFT RENAL CALCULI    OTHER SURGICAL HISTORY  05-08-15    Cystoscopy retrograde pyelogram    SKIN BIOPSY      skin cancer nose    TOTAL HIP ARTHROPLASTY Right 2014    right hip ZANE Clayton MD TOTAL HIP ARTHROPLASTY Left 07/18/2016    left hip ZANE Clayton MD       Current Outpatient Medications:     fluticasone (FLONASE) 50 MCG/ACT nasal spray, 2 sprays by Each Nostril route daily, Disp: 16 g, Rfl: 1    triamcinolone (KENALOG) 0.025 % cream, Apply Topically to both ears for itching 2-3 times per week as needed. Stop when symptoms resolve. (Patient taking differently: Apply Topically to both ears for itching 2-3 times per week as needed. Stop when symptoms resolve.), Disp: 1 each, Rfl: 2    vitamin D 25 MCG (1000 UT) CAPS, Take by mouth, Disp: , Rfl:     ondansetron (ZOFRAN ODT) 8 MG TBDP disintegrating tablet, Take 1 tablet by mouth every 8 hours as needed for Nausea, Disp: 12 tablet, Rfl: 0    tamsulosin (FLOMAX) 0.4 MG capsule, Take 1 capsule by mouth daily, Disp: 15 capsule, Rfl: 0    amLODIPine (NORVASC) 5 MG tablet, TK 1 T PO QD, Disp: , Rfl:     rivaroxaban (XARELTO) 10 MG TABS tablet, Take 10 mg by mouth, Disp: , Rfl:     digoxin (LANOXIN) 125 MCG tablet, Take 1 tablet by mouth daily, Disp: 30 tablet, Rfl: 3    Multiple Vitamins-Minerals (RA VISION-MARIA ELENA PRESERVE PO), Take by mouth daily LD 7/12/16, Disp: , Rfl:   Doxycycline, Iodine, Other, Cefoxitin sodium in dextrose, Hycomine compound [pe-cpm-hydrocod-apap-caff], Iodine, and Mefoxin [cefoxitin]  Social History     Tobacco Use    Smoking status: Never    Smokeless tobacco: Never   Vaping Use    Vaping Use: Never used   Substance Use Topics    Alcohol use: Yes     Alcohol/week: 0.0 standard drinks     Comment: occassional     Drug use: No     Family History   Problem Relation Age of Onset    Kidney Disease Mother     Heart Disease Father        Review of Systems   Constitutional: Negative. Negative for activity change and appetite change. HENT:  Positive for hearing loss. Negative for congestion, ear discharge, ear pain, postnasal drip, rhinorrhea, sinus pressure and sinus pain. Eyes: Negative. Respiratory: Negative.   Negative for shortness of breath and stridor. Cardiovascular: Negative. Negative for chest pain and palpitations. Endocrine: Negative. Musculoskeletal: Negative. Skin: Negative. Neurological:  Negative for dizziness. Hematological: Negative. Psychiatric/Behavioral: Negative. BP (!) 167/85   Pulse 73   Ht 4' 11\" (1.499 m)   Wt 131 lb (59.4 kg)   LMP  (LMP Unknown)   BMI 26.46 kg/m²   Physical Exam  Constitutional:       Appearance: Normal appearance. HENT:      Head: Normocephalic. Right Ear: Tympanic membrane, ear canal and external ear normal. Decreased hearing noted. No middle ear effusion. Left Ear: Tympanic membrane, ear canal and external ear normal. Decreased hearing noted. No middle ear effusion. Ears:        Nose: Nose normal. No rhinorrhea. Right Turbinates: Not swollen or pale. Left Turbinates: Not swollen or pale. Mouth/Throat:      Lips: Pink. Mouth: Mucous membranes are moist.      Pharynx: Oropharynx is clear. Eyes:      Conjunctiva/sclera: Conjunctivae normal.      Pupils: Pupils are equal, round, and reactive to light. Cardiovascular:      Rate and Rhythm: Normal rate and regular rhythm. Pulses: Normal pulses. Pulmonary:      Effort: Pulmonary effort is normal. No respiratory distress. Breath sounds: No stridor. Musculoskeletal:         General: Normal range of motion. Cervical back: Normal range of motion. No rigidity. No muscular tenderness. Skin:     General: Skin is warm and dry. Neurological:      General: No focal deficit present. Mental Status: She is alert and oriented to person, place, and time. Psychiatric:         Mood and Affect: Mood normal.         Behavior: Behavior normal.         Thought Content: Thought content normal.         Judgment: Judgment normal.     Audiogram and tympanogram reviewed with patient.   Audiogram reveals 60 dB hearing loss in the right ear with 64% discrimination at 90 dB, 60 dB of hearing loss in the left ear with 64% discrimination at 90 dB. Audiogram is symmetrical. Tympanogram reveals type A curve in the right ear, with type A curve in the left ear. IMPRESSION/PLAN:    Ladi Smith was seen today for follow-up. Diagnoses and all orders for this visit:    Sensorineural hearing loss (SNHL) of both ears    Dysfunction of right eustachian tube      At this time patient will continue with Flonase, 2 sprays each nostril once daily for ongoing treatment of eustachian tube dysfunction. Audiogram is reviewed with the patient and found to be consistent with her most recent study of 1 year ago. She will be directed to audiology for replacement of her  for her hearing aids. She will follow-up in 6 months for reevaluation.   She is instructed to call with any new or worsening symptoms prior to her next appointment    Hailee Galicia, MSN, FNP-C  [facility], Nose and Throat    The information contained in this note has been dictated using drug and medical speech recognition software and may contain errors

## 2022-11-10 NOTE — PROGRESS NOTES
This patient was referred for tympanometric testing by NIVIA Ospina due to bilateral ear pressure and a longstanding bilateral hearing loss. Patient has been wearing hearing aids for several years. Pure tone air and bone conduction testing revealed a moderately-severe sensorineural hearing loss, through the frequency range, bilaterally. Reliability was fair. Speech reception thresholds were in agreement with pure tone averages, bilaterally. Word recognition scores were 64% bilaterally at 90dBHL. Tympanometry revealed slight negative middle ear peak pressure and normal compliance, bilaterally. The results were reviewed with the patient. Recommendations for follow up will be made pending physician consult.     Terri Khanna CCC/DEEDEE  Audiologist  [id number]  NPI#:  [id number]      Electronically signed by Janett Acuña on 11/10/2022 at 2:14 PM

## 2022-11-11 ENCOUNTER — TELEPHONE (OUTPATIENT)
Dept: AUDIOLOGY | Age: 82
End: 2022-11-11

## 2022-11-11 NOTE — TELEPHONE ENCOUNTER
Hearing aid  ordered from [address] [place] [address]   Total cost to patient $300.00  Confirmation for order, per Signia:  [id number]    Allen Abel, [address]  [id number]  NPI#:  [id number]

## 2022-12-01 RX ORDER — FLUTICASONE PROPIONATE 50 MCG
SPRAY, SUSPENSION (ML) NASAL
Qty: 3 EACH | Refills: 1 | Status: SHIPPED | OUTPATIENT
Start: 2022-12-01

## 2023-02-16 ENCOUNTER — HOSPITAL ENCOUNTER (EMERGENCY)
Age: 83
Discharge: HOME | End: 2023-02-16
Payer: MEDICARE

## 2023-02-16 ENCOUNTER — APPOINTMENT (OUTPATIENT)
Dept: GENERAL RADIOLOGY | Age: 83
End: 2023-02-16
Payer: MEDICARE

## 2023-02-16 VITALS
BODY MASS INDEX: 26.21 KG/M2 | DIASTOLIC BLOOD PRESSURE: 84 MMHG | OXYGEN SATURATION: 98 % | TEMPERATURE: 98.3 F | HEIGHT: 59 IN | HEART RATE: 82 BPM | SYSTOLIC BLOOD PRESSURE: 160 MMHG | RESPIRATION RATE: 20 BRPM | WEIGHT: 130 LBS

## 2023-02-16 DIAGNOSIS — S50.311A ABRASION OF RIGHT ELBOW, INITIAL ENCOUNTER: ICD-10-CM

## 2023-02-16 DIAGNOSIS — S50.01XA CONTUSION OF RIGHT ELBOW, INITIAL ENCOUNTER: Primary | ICD-10-CM

## 2023-02-16 PROCEDURE — 90715 TDAP VACCINE 7 YRS/> IM: CPT | Performed by: PHYSICIAN ASSISTANT

## 2023-02-16 PROCEDURE — 6360000002 HC RX W HCPCS: Performed by: PHYSICIAN ASSISTANT

## 2023-02-16 PROCEDURE — 99211 OFF/OP EST MAY X REQ PHY/QHP: CPT

## 2023-02-16 PROCEDURE — 90471 IMMUNIZATION ADMIN: CPT | Performed by: PHYSICIAN ASSISTANT

## 2023-02-16 PROCEDURE — 73070 X-RAY EXAM OF ELBOW: CPT | Performed by: RADIOLOGY

## 2023-02-16 RX ADMIN — TETANUS TOXOID, REDUCED DIPHTHERIA TOXOID AND ACELLULAR PERTUSSIS VACCINE, ADSORBED 0.5 ML: 5; 2.5; 8; 8; 2.5 SUSPENSION INTRAMUSCULAR at 11:03

## 2023-02-16 ASSESSMENT — PAIN DESCRIPTION - ONSET: ONSET: SUDDEN

## 2023-02-16 ASSESSMENT — PAIN DESCRIPTION - FREQUENCY: FREQUENCY: INTERMITTENT

## 2023-02-16 ASSESSMENT — PAIN DESCRIPTION - DESCRIPTORS: DESCRIPTORS: SORE;TENDER

## 2023-02-16 ASSESSMENT — PAIN DESCRIPTION - PAIN TYPE: TYPE: ACUTE PAIN

## 2023-02-16 ASSESSMENT — PAIN - FUNCTIONAL ASSESSMENT: PAIN_FUNCTIONAL_ASSESSMENT: 0-10

## 2023-02-16 ASSESSMENT — PAIN DESCRIPTION - ORIENTATION: ORIENTATION: RIGHT

## 2023-02-16 ASSESSMENT — PAIN SCALES - GENERAL: PAINLEVEL_OUTOF10: 5

## 2023-02-16 ASSESSMENT — PAIN DESCRIPTION - LOCATION: LOCATION: ELBOW

## 2023-02-16 NOTE — ED NOTES
Wound cleansed with NSS. Triple antibiotic ointment and DSD applied.      Ronit Siddiqi, MURTAZA  34/34/44 [id number]

## 2023-02-16 NOTE — ED PROVIDER NOTES
[facility] URGENT CARE  EMERGENCY DEPARTMENT ENCOUNTER        NAME: Tamara Graff  :  1940  MRN:  [id number]  Date of evaluation: 2023  Provider: Shyam Ball PA-C  PCP: Mayelin Padilla MD  Note Started : 9:48 AM EST 23    Chief Complaint: Elbow Injury (States she hit her elbow on a door frame yesterday. Has abrasion and ecchymotic area on right elbow.)      This is a 64-year-old female that presents to urgent care complaining of a right elbow injury that happened yesterday. She states that she hit her elbow against a door frame yesterday. The area is swollen and bruised and there is an abrasion there as well. Patient is on a blood thinner. She denies head neck back chest or other extremity pain. No numbness or tingling. On first contact patient she appears to be in no acute distress. Review of Systems  Pertinent positives and negatives are stated within HPI, all other systems reviewed and are negative. Allergies: Doxycycline, Iodine, Other, Cefoxitin sodium in dextrose, Hycomine compound [pe-cpm-hydrocod-apap-caff], Iodine, and Mefoxin [cefoxitin]     --------------------------------------------- PAST HISTORY ---------------------------------------------  Past Medical History:  has a past medical history of Anxiety, Cancer ([facility] [address] DJD (degenerative joint disease), History of Holter monitoring, Hyperlipidemia, Hypertension, Irregular heart beat, Osteoarthritis, Preoperative clearance, Renal calculi, and Ureteral perforation secondary to stent manipulation. Past Surgical History:  has a past surgical history that includes Cholecystectomy; Hysterectomy; skin biopsy; other surgical history (05-08-15); Lithotripsy (Left, 2015); Total hip arthroplasty (Right, 2014); and Total hip arthroplasty (Left, 2016). Social History:  reports that she has never smoked. She has never used smokeless tobacco. She reports current alcohol use.  She reports that she does not use drugs. Family History: family history includes Heart Disease in her father; Kidney Disease in her mother. The patients home medications have been reviewed. The nursing notes within the ED encounter have been reviewed. ------------------------------------------------SCREENINGS----------------------------------------------                        CIWA Assessment  BP: (!) 160/84  Heart Rate: 82           ---------------------------------------------PHYSICAL EXAM --------------------------------------------    Vitals:    02/16/23 0924   BP: (!) 160/84   Pulse: 82   Resp: 20   Temp: 98.3 °F (36.8 °C)   TempSrc: Infrared   SpO2: 98%   Weight: 130 lb (59 kg)   Height: 4' 11\" (1.499 m)     Oxygen Saturation Interpretation: Normal     Physical Exam  Vitals and nursing note reviewed. Constitutional:       Appearance: She is well-developed. HENT:      Head: Normocephalic and atraumatic. Right Ear: Hearing and external ear normal.      Left Ear: Hearing and external ear normal.      Nose: Nose normal.      Mouth/Throat:      Pharynx: Uvula midline. Eyes:      General: Lids are normal.      Conjunctiva/sclera: Conjunctivae normal.      Pupils: Pupils are equal, round, and reactive to light. Cardiovascular:      Rate and Rhythm: Normal rate and regular rhythm. Heart sounds: Normal heart sounds. Pulmonary:      Effort: Pulmonary effort is normal.      Breath sounds: Normal breath sounds. Abdominal:      Palpations: Abdomen is not rigid. Tenderness: There is no rebound. Musculoskeletal:      Cervical back: Full passive range of motion without pain, normal range of motion and neck supple. No spinous process tenderness or muscular tenderness. Comments: Lateral aspect of the right elbow is tender to palpation. Range of motion is full but painful. Has no wrist or shoulder tenderness. Skin:     General: Skin is warm and dry. Findings: Abrasion and wound present. Comments: Area of abrasion and bruising noted to the lateral aspect of the right elbow right elbow has full range of motion there is tenderness with movement and palpation. No active bleeding at this time. No laceration. No skin tear. Neurological:      General: No focal deficit present. Mental Status: She is alert and oriented to person, place, and time. GCS: GCS eye subscore is 4. GCS verbal subscore is 5. GCS motor subscore is 6.       -------------------------------------------------- RESULTS -------------------------------------------------  No results found for this visit on 02/16/23. XR ELBOW RIGHT (2 VIEWS)   Final Result   Degenerative changes of the elbow. No acute osseous abnormality. Labs Reviewed - No data to display    When ordered only abnormal lab results are displayed. All other labs were within normal range or not returned as of this dictation. Interpretation per the Radiologist below, if available at the time of this note:    XR ELBOW RIGHT (2 VIEWS)   Final Result   Degenerative changes of the elbow. No acute osseous abnormality. No results found. No results found.     -------------------------------------------------PROCEDURES--------------------------------------------  Unless otherwise noted below, none      Procedures      ---EMERGENCY DEPARTMENT COURSE and DIFFERENTIAL DIAGNOSIS/MDM---  (CC/HPI Summary, DDx, ED Course, and Reassessment:) (Disposition Considerations (include 1 Tests not done, Admit vs D/C, Shared Decision Making, Pt Expectation of Test or Tx., Consults, Social Determinants, Chronic Conditiions, Records reviewed)    MDM  Number of Diagnoses or Management Options  Abrasion of right elbow, initial encounter  Contusion of right elbow, initial encounter  Diagnosis management comments: No acute distress. X-ray of right elbow shows no fracture or dislocation. Treat for bruise.   Also has an abrasion to the site wound care was discussed. Instructions given tetanus updated. DISCHARGE MEDICATIONS:  Discharge Medication List as of 2/16/2023 10:59 AM          DISCONTINUED MEDICATIONS:  Discharge Medication List as of 2/16/2023 10:59 AM          PATIENT REFERRED TO:  Masha Byrd MD  [address] [place] [address] [address]    Schedule an appointment as soon as possible for a visit       --------------------------------- ADDITIONAL PROVIDER NOTES ---------------------------------  I have spoken with the patient and discussed todays results, in addition to providing specific details for the plan of care and counseling regarding the diagnosis and prognosis. Their questions are answered at this time and they are agreeable with the plan. This patient is stable for discharge. I have shared the specific conditions for return, as well as the importance of follow-up. * NOTE: (Please note that portions of this note were completed with a voice recognition program.  Efforts were made to edit the dictations but occasionally words are mis-transcribed. )    --------------------------------- IMPRESSION AND DISPOSITION ---------------------------------    IMPRESSION  1. Contusion of right elbow, initial encounter    2. Abrasion of right elbow, initial encounter        DISPOSITION Decision To Discharge 02/16/2023 10:59:13 AM    Disposition: Discharge to home  Patient condition is good    I am the Primary Clinician of Record.      Aj Gaming PA-C (electronically signed) on 2/16/2023 at 1:35 PM        Aj Gaming PA-C  02/16/23 3311

## 2023-04-17 ENCOUNTER — TELEPHONE (OUTPATIENT)
Dept: ENT CLINIC | Age: 83
End: 2023-04-17

## 2023-04-17 NOTE — TELEPHONE ENCOUNTER
Pt saw her audiologist due to having difficulty hearing, and they told her that her ears are compacted. She has an appt with [facility] on 5/15 but she said she cannot wait that long, due to not being able to hear. Please advise if pt can be seen sooner?

## 2023-04-17 NOTE — TELEPHONE ENCOUNTER
Called pt and spoke to pt sister in law Jimmie Armstrong, advised that pt should keep appointment on 5/15, there are no sooner appointments available here or [place]. Jimmie Armstrong understood and said she would relay the message.

## 2023-05-05 ENCOUNTER — OFFICE VISIT (OUTPATIENT)
Dept: SURGERY | Age: 83
End: 2023-05-05

## 2023-05-05 VITALS — OXYGEN SATURATION: 98 % | HEART RATE: 88 BPM | TEMPERATURE: 97.5 F

## 2023-05-05 DIAGNOSIS — C44.92 SCC (SQUAMOUS CELL CARCINOMA): Primary | ICD-10-CM

## 2023-05-05 NOTE — PROGRESS NOTES
Department of Plastic Surgery - Adult  Attending Consult Note      CHIEF COMPLAINT:   Squamous Cell Cancer of right upper arm    History Obtained From:  patient    HISTORY OF PRESENT ILLNESS:                The patient is a 78 y.o. 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 The patient states that they first noticed the lesion several weeks ago. It has rapidly grown in size since they first noticed the lesion. The lesion has  changed in color and has not had discharge or bleeding. The pt has had the lesion biopsied previously. The patient has not had the lesion removed previously. The patient states the lesion is at times painfull. The pt denies any associated symptoms. Past Medical History:    Past Medical History:   Diagnosis Date    Anxiety     no medications, becomes short of breath when upset/ anxious    Cancer ([address] 2013    uterine    DJD (degenerative joint disease) 06/02/2014    Rt. RICHARD 6/2/2014 ZANE Dean MD    History of Holter monitoring 09/02/2022    Hyperlipidemia     no meds    Hypertension     Irregular heart beat     1 episode while hospitalized for kidney stone    Osteoarthritis 07/18/2016    Lt. RICHARD 7/18/2016 ZANE Clayton MD    Preoperative clearance     Dr Valeria Dejesus, medical for right hip arthroplasty 6/14    Renal calculi     for or 8/5/2015    Ureteral perforation secondary to stent manipulation 05/08/2015     Past Surgical History:    Past Surgical History:   Procedure Laterality Date    CHOLECYSTECTOMY      HYSTERECTOMY (CERVIX STATUS UNKNOWN)      LITHOTRIPSY Left 8/05/2015    LEFT RENAL CALCULI    OTHER SURGICAL HISTORY  05-08-15    Cystoscopy retrograde pyelogram    SKIN BIOPSY      skin cancer nose    TOTAL HIP ARTHROPLASTY Right 06/02/2014    right hip ZANE Clayton MD    TOTAL HIP ARTHROPLASTY Left 07/18/2016    left hip ZANE Clayton MD     Current Medications:       Current Outpatient Medications   Medication Sig Dispense Refill

## 2023-05-15 ENCOUNTER — PROCEDURE VISIT (OUTPATIENT)
Dept: AUDIOLOGY | Age: 83
End: 2023-05-15
Payer: MEDICARE

## 2023-05-15 ENCOUNTER — OFFICE VISIT (OUTPATIENT)
Dept: ENT CLINIC | Age: 83
End: 2023-05-15
Payer: MEDICARE

## 2023-05-15 VITALS — SYSTOLIC BLOOD PRESSURE: 181 MMHG | HEART RATE: 80 BPM | DIASTOLIC BLOOD PRESSURE: 84 MMHG

## 2023-05-15 DIAGNOSIS — J34.89 NASAL DRYNESS: ICD-10-CM

## 2023-05-15 DIAGNOSIS — H65.90 FLUID COLLECTION OF MIDDLE EAR: Primary | ICD-10-CM

## 2023-05-15 DIAGNOSIS — R09.82 POST-NASAL DRAINAGE: ICD-10-CM

## 2023-05-15 DIAGNOSIS — H65.91 RIGHT SEROUS OTITIS MEDIA, UNSPECIFIED CHRONICITY: ICD-10-CM

## 2023-05-15 DIAGNOSIS — H90.3 SENSORINEURAL HEARING LOSS (SNHL) OF BOTH EARS: Primary | ICD-10-CM

## 2023-05-15 DIAGNOSIS — H69.81 DYSFUNCTION OF RIGHT EUSTACHIAN TUBE: ICD-10-CM

## 2023-05-15 PROCEDURE — 92567 TYMPANOMETRY: CPT | Performed by: AUDIOLOGIST

## 2023-05-15 PROCEDURE — 3077F SYST BP >= 140 MM HG: CPT | Performed by: NURSE PRACTITIONER

## 2023-05-15 PROCEDURE — 3079F DIAST BP 80-89 MM HG: CPT | Performed by: NURSE PRACTITIONER

## 2023-05-15 PROCEDURE — 1090F PRES/ABSN URINE INCON ASSESS: CPT | Performed by: NURSE PRACTITIONER

## 2023-05-15 PROCEDURE — G8400 PT W/DXA NO RESULTS DOC: HCPCS | Performed by: NURSE PRACTITIONER

## 2023-05-15 PROCEDURE — 1123F ACP DISCUSS/DSCN MKR DOCD: CPT | Performed by: NURSE PRACTITIONER

## 2023-05-15 PROCEDURE — 1036F TOBACCO NON-USER: CPT | Performed by: NURSE PRACTITIONER

## 2023-05-15 PROCEDURE — 99213 OFFICE O/P EST LOW 20 MIN: CPT | Performed by: NURSE PRACTITIONER

## 2023-05-15 PROCEDURE — G8417 CALC BMI ABV UP PARAM F/U: HCPCS | Performed by: NURSE PRACTITIONER

## 2023-05-15 PROCEDURE — G8427 DOCREV CUR MEDS BY ELIG CLIN: HCPCS | Performed by: NURSE PRACTITIONER

## 2023-05-15 RX ORDER — FLUTICASONE PROPIONATE 50 MCG
SPRAY, SUSPENSION (ML) NASAL
Qty: 48 G | OUTPATIENT
Start: 2023-05-15

## 2023-05-15 RX ORDER — FLUTICASONE PROPIONATE 50 MCG
2 SPRAY, SUSPENSION (ML) NASAL DAILY
Qty: 16 G | Refills: 1 | Status: SHIPPED | OUTPATIENT
Start: 2023-05-15

## 2023-05-15 ASSESSMENT — ENCOUNTER SYMPTOMS
SHORTNESS OF BREATH: 0
RESPIRATORY NEGATIVE: 1
EYES NEGATIVE: 1
STRIDOR: 0
SINUS PRESSURE: 0
SINUS PAIN: 0
RHINORRHEA: 0

## 2023-05-15 NOTE — PROGRESS NOTES
This patient was referred for tympanometric testing by NIVIA Esquivel due to ear fullness, right ear. Tympanometry revealed a flat tympanogram, right ear and normal middle ear peak pressure and compliance, , left ear. The results were reviewed with the patient. Patient has elected to purchase hearing aids, through the department. Will contact patient when received. Recommendations for follow up will be made pending physician consult.     Michelle Hopkins CCC/DEEDEE  Audiologist  [id number]  NPI#:  [id number]      Electronically signed by Janett Venegas on 5/15/2023 at 11:01 AM

## 2023-05-15 NOTE — PROGRESS NOTES
[facility]  Dr. Elvi Leon. [place]. Ms.Ed        Patient Name:  Gage Valenzuela  :  1940     CHIEF C/O:    Chief Complaint   Patient presents with    Follow-up     6 month hearing loss        HISTORY OBTAINED FROM:  patient    HISTORY OF PRESENT ILLNESS:       Shelbi Del Valle is a 78y.o. year old female, here today for follow up of eustachian tube dysfunction. Patient lasting 6 months ago and has since lost and broken all of her hearing aids. She states she is struggling at this time has been to multiple audiologist but has not been fit for hearing aids as of yet. She states that she notices increased fullness in the right ear with decreased hearing intermittently. She was on Flonase in the past for eustachian tube dysfunction but states she stopped the medication because it was not helping her allergy symptoms. Denies current ear pain or pressure. She does note some increased muffled hearing in the right ear. She denies any significant congestion, rhinorrhea, but does have some postnasal drainage symptoms at this time. Denies any recent fevers or recent antibiotics. Patient is interested in being fit for new hearing aids at this time. Past Medical History:   Diagnosis Date    Anxiety     no medications, becomes short of breath when upset/ anxious    Cancer ([address]     uterine    DJD (degenerative joint disease) 2014    Rt. RICHARD 2014 ZANE Valerio MD    History of Holter monitoring 2022    Hyperlipidemia     no meds    Hypertension     Irregular heart beat     1 episode while hospitalized for kidney stone    Osteoarthritis 2016    Lt. RICHARD 2016 ZANE Clayton MD    Preoperative clearance     Dr Amalia King, medical for right hip arthroplasty     Renal calculi     for or 2015    Ureteral perforation secondary to stent manipulation 2015     Past Surgical History:   Procedure Laterality Date    CHOLECYSTECTOMY      HYSTERECTOMY (CERVIX STATUS UNKNOWN)

## 2023-06-05 ENCOUNTER — TELEPHONE (OUTPATIENT)
Dept: AUDIOLOGY | Age: 83
End: 2023-06-05

## 2023-06-05 NOTE — TELEPHONE ENCOUNTER
Spoke to patient regarding hearing aid fitting appointment. Patient was very nasty during the phone call. , swearing about the ENT medications not working/swearing about the length of time for appointments, etc.  Explained to patient we want to help her but will not allow her to berate us, verbally, on the phone or in person. Explained to patient that I was charting this event in her chart.   Patient did schedule a hearing aid fitting on 6/12/23 at 11:15am.    Megan Angeles CCC/DEEDEE  Audiologist  [id number]  NPI#:  [id number]

## 2023-06-21 ENCOUNTER — PROCEDURE VISIT (OUTPATIENT)
Dept: SURGERY | Age: 83
End: 2023-06-21

## 2023-06-21 ENCOUNTER — PROCEDURE VISIT (OUTPATIENT)
Dept: AUDIOLOGY | Age: 83
End: 2023-06-21

## 2023-06-21 DIAGNOSIS — C44.92 SCC (SQUAMOUS CELL CARCINOMA): Primary | ICD-10-CM

## 2023-06-21 DIAGNOSIS — H90.3 SENSORINEURAL HEARING LOSS, BILATERAL: Primary | ICD-10-CM

## 2023-06-21 PROCEDURE — 99024 POSTOP FOLLOW-UP VISIT: CPT | Performed by: AUDIOLOGIST

## 2023-06-21 RX ORDER — LIDOCAINE HYDROCHLORIDE AND EPINEPHRINE BITARTRATE 20; .01 MG/ML; MG/ML
1 INJECTION, SOLUTION SUBCUTANEOUS ONCE
Status: COMPLETED | OUTPATIENT
Start: 2023-06-21 | End: 2023-06-21

## 2023-06-21 RX ADMIN — LIDOCAINE HYDROCHLORIDE AND EPINEPHRINE BITARTRATE 1 ML: 20; .01 INJECTION, SOLUTION SUBCUTANEOUS at 12:27

## 2023-06-21 NOTE — PROGRESS NOTES
Patient seen for 10-day hearing aid check. Patient requested an increase in gain, bilaterally. No other issues noted. Patient scheduled to return for 30-day hearing aid check.     Omero Espinal CCC/A  Audiologist  [id number]  NPI#:  [id number]

## 2023-07-06 ENCOUNTER — TELEPHONE (OUTPATIENT)
Dept: AUDIOLOGY | Age: 83
End: 2023-07-06

## 2023-07-06 ENCOUNTER — OFFICE VISIT (OUTPATIENT)
Dept: SURGERY | Age: 83
End: 2023-07-06

## 2023-07-06 ENCOUNTER — PROCEDURE VISIT (OUTPATIENT)
Dept: AUDIOLOGY | Age: 83
End: 2023-07-06

## 2023-07-06 VITALS — TEMPERATURE: 97.6 F

## 2023-07-06 DIAGNOSIS — C44.92 SCC (SQUAMOUS CELL CARCINOMA): Primary | ICD-10-CM

## 2023-07-06 DIAGNOSIS — H90.3 SENSORINEURAL HEARING LOSS, BILATERAL: Primary | ICD-10-CM

## 2023-07-06 PROCEDURE — 99024 POSTOP FOLLOW-UP VISIT: CPT | Performed by: PHYSICIAN ASSISTANT

## 2023-07-06 PROCEDURE — 99024 POSTOP FOLLOW-UP VISIT: CPT | Performed by: AUDIOLOGIST

## 2023-07-06 NOTE — TELEPHONE ENCOUNTER
Patient's family member called and left a voicemail message. Returned patient message.  Left a voicemail for patient to call back for scheduling    Electronically signed by Janett Contreras on 7/6/2023 at 8:37 AM

## 2023-07-07 NOTE — PROGRESS NOTES
Patient seen for hearing aid check, for increase in gain. Explained to patient, hearing aids are at the limit for gain, using the power dome fitting. Will have to move to custom molds, if this increase is not sufficient. Patient understands this plan of care. Patient scheduled for 7/18/23, for a 30-day hearing aid check/billing, if patient keeping hearing aids.     Hallie Kuhn CCC/DEEDEE  Audiologist  [id number]  NPI#:  [id number]

## 2023-07-18 ENCOUNTER — OFFICE VISIT (OUTPATIENT)
Dept: ENT CLINIC | Age: 83
End: 2023-07-18
Payer: MEDICARE

## 2023-07-18 VITALS
HEIGHT: 59 IN | SYSTOLIC BLOOD PRESSURE: 163 MMHG | DIASTOLIC BLOOD PRESSURE: 79 MMHG | HEART RATE: 72 BPM | BODY MASS INDEX: 27.82 KG/M2 | WEIGHT: 138 LBS

## 2023-07-18 DIAGNOSIS — H69.83 DYSFUNCTION OF BOTH EUSTACHIAN TUBES: ICD-10-CM

## 2023-07-18 DIAGNOSIS — H90.3 SENSORINEURAL HEARING LOSS (SNHL) OF BOTH EARS: Primary | ICD-10-CM

## 2023-07-18 DIAGNOSIS — H93.8X3 EAR PRESSURE, BILATERAL: ICD-10-CM

## 2023-07-18 PROCEDURE — 99213 OFFICE O/P EST LOW 20 MIN: CPT | Performed by: OTOLARYNGOLOGY

## 2023-07-18 PROCEDURE — 1090F PRES/ABSN URINE INCON ASSESS: CPT | Performed by: OTOLARYNGOLOGY

## 2023-07-18 PROCEDURE — 3077F SYST BP >= 140 MM HG: CPT | Performed by: OTOLARYNGOLOGY

## 2023-07-18 PROCEDURE — 1036F TOBACCO NON-USER: CPT | Performed by: OTOLARYNGOLOGY

## 2023-07-18 PROCEDURE — G8417 CALC BMI ABV UP PARAM F/U: HCPCS | Performed by: OTOLARYNGOLOGY

## 2023-07-18 PROCEDURE — 3078F DIAST BP <80 MM HG: CPT | Performed by: OTOLARYNGOLOGY

## 2023-07-18 PROCEDURE — G8427 DOCREV CUR MEDS BY ELIG CLIN: HCPCS | Performed by: OTOLARYNGOLOGY

## 2023-07-18 PROCEDURE — 1123F ACP DISCUSS/DSCN MKR DOCD: CPT | Performed by: OTOLARYNGOLOGY

## 2023-07-18 PROCEDURE — G8400 PT W/DXA NO RESULTS DOC: HCPCS | Performed by: OTOLARYNGOLOGY

## 2023-07-18 ASSESSMENT — ENCOUNTER SYMPTOMS
EYES NEGATIVE: 1
RHINORRHEA: 0
SINUS PAIN: 0
SHORTNESS OF BREATH: 0
SINUS PRESSURE: 0
RESPIRATORY NEGATIVE: 1
STRIDOR: 0

## 2023-07-18 NOTE — PROGRESS NOTES
audiogram            Pearla Moles  1940      I have discussed the case, including pertinent history and exam findings with the resident. I have seen and examined the patient and the key elements of the encounter have been performed by me. I agree with the assessment, plan and orders as documented by the resident. Patient here for follow up of medical problems. Remainder of medical problems as per resident note.       [facility],   7/18/23

## 2023-07-24 ENCOUNTER — PROCEDURE VISIT (OUTPATIENT)
Dept: AUDIOLOGY | Age: 83
End: 2023-07-24
Payer: MEDICARE

## 2023-07-24 DIAGNOSIS — H90.3 SENSORINEURAL HEARING LOSS, BILATERAL: Primary | ICD-10-CM

## 2023-07-24 PROCEDURE — V5140 BEHIND EAR BINAUR HEARING AI: HCPCS | Performed by: AUDIOLOGIST

## 2023-07-24 PROCEDURE — V5160 DISPENSING FEE BINAURAL: HCPCS | Performed by: AUDIOLOGIST

## 2023-07-24 NOTE — PROGRESS NOTES
The patient came in for a 30 day hearing aid follow up. Patient reported they are doing well with the hearing aids. and they have chosen to keep the them. Changes to hearing aids  today: ear mold impressions taken for custom molds. . Counseled patient on: N/A. Patient is satisfied with the hearing aids and billing will be done today as a self-pay patient. No insurance coverage for hearing aids through [facility] ([facility]), patient has agreed upon payment and understands. Will contact patient when ear molds are delivered to the department. Patient paid balance in full with [employer].     Erik Mandel CCC/DEEDEE  Audiologist  [id number]  NPI#:  [id number]      Electronically signed by Janett Lamar on 7/24/2023 at 2:55 PM

## 2023-08-24 ENCOUNTER — PROCEDURE VISIT (OUTPATIENT)
Dept: AUDIOLOGY | Age: 83
End: 2023-08-24

## 2023-08-24 DIAGNOSIS — H90.3 SENSORINEURAL HEARING LOSS, BILATERAL: Primary | ICD-10-CM

## 2023-08-24 PROCEDURE — 99024 POSTOP FOLLOW-UP VISIT: CPT | Performed by: AUDIOLOGIST

## 2023-08-24 NOTE — PROGRESS NOTES
Patient's speakers switched to custom skeleton ear molds with power speakers. Hearing aids re-programmed and patient will use this fitting. She will update after she wears the hearing aids, with custom molds for a couple weeks.     Shirley Cole CCC/DEEDEE  Audiologist  [id number]  NPI#:  [id number]

## 2023-08-29 ENCOUNTER — TELEPHONE (OUTPATIENT)
Dept: AUDIOLOGY | Age: 83
End: 2023-08-29

## 2023-08-29 NOTE — TELEPHONE ENCOUNTER
Patient's family called the [facility] number regarding her new earmolds. She wants to schedule with Lowell regarding the earmolds and discomfort. Told her to keep the hearing aid out of her ear if it is hurting. They know Kenrick Butterfield is out of the office this week and will call and leave a message for her to call them back next week.     Electronically signed by Janett Nathan on 8/29/2023 at 5:17 PM

## 2023-09-05 ENCOUNTER — TELEPHONE (OUTPATIENT)
Dept: AUDIOLOGY | Age: 83
End: 2023-09-05

## 2023-09-05 NOTE — TELEPHONE ENCOUNTER
Patient called and left a voicemail message. Returned patient message. Left a voicemail at the patient contact number.     Electronically signed by Janett Das on 9/5/2023 at 8:42 AM

## 2023-09-07 ENCOUNTER — PROCEDURE VISIT (OUTPATIENT)
Dept: AUDIOLOGY | Age: 83
End: 2023-09-07

## 2023-09-07 DIAGNOSIS — H91.93 SEVERE HEARING LOSS OF BOTH EARS: Primary | ICD-10-CM

## 2023-09-07 PROCEDURE — 99024 POSTOP FOLLOW-UP VISIT: CPT | Performed by: AUDIOLOGIST

## 2023-09-07 NOTE — PROGRESS NOTES
Ear mold modification completed to right mold. Patient will contact department PRN.     Stephanie Douglas CCC/DEEDEE  Audiologist  [id number]  NPI#:  [id number]

## 2023-09-27 ENCOUNTER — OFFICE VISIT (OUTPATIENT)
Dept: FAMILY MEDICINE CLINIC | Age: 83
End: 2023-09-27
Payer: MEDICARE

## 2023-09-27 VITALS
SYSTOLIC BLOOD PRESSURE: 134 MMHG | DIASTOLIC BLOOD PRESSURE: 79 MMHG | TEMPERATURE: 97.6 F | HEIGHT: 59 IN | WEIGHT: 138 LBS | OXYGEN SATURATION: 97 % | BODY MASS INDEX: 27.82 KG/M2 | HEART RATE: 81 BPM | RESPIRATION RATE: 17 BRPM
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
## 2023-09-27 DIAGNOSIS — S10.96XA INSECT BITE OF NECK, INITIAL ENCOUNTER: Primary | ICD-10-CM

## 2023-09-27 PROCEDURE — 3078F DIAST BP <80 MM HG: CPT

## 2023-09-27 PROCEDURE — G8400 PT W/DXA NO RESULTS DOC: HCPCS

## 2023-09-27 PROCEDURE — G8417 CALC BMI ABV UP PARAM F/U: HCPCS

## 2023-09-27 PROCEDURE — 1090F PRES/ABSN URINE INCON ASSESS: CPT

## 2023-09-27 PROCEDURE — 1123F ACP DISCUSS/DSCN MKR DOCD: CPT

## 2023-09-27 PROCEDURE — 99213 OFFICE O/P EST LOW 20 MIN: CPT

## 2023-09-27 PROCEDURE — G8427 DOCREV CUR MEDS BY ELIG CLIN: HCPCS

## 2023-09-27 PROCEDURE — 1036F TOBACCO NON-USER: CPT

## 2023-09-27 PROCEDURE — 3075F SYST BP GE 130 - 139MM HG: CPT

## 2023-09-27 RX ORDER — LORATADINE 10 MG/1
10 TABLET ORAL DAILY
Qty: 5 TABLET | Refills: 0 | Status: SHIPPED | OUTPATIENT
Start: 2023-09-27

## 2023-09-27 RX ORDER — METHYLPREDNISOLONE 4 MG/1
TABLET ORAL
Qty: 1 KIT | Refills: 0 | Status: SHIPPED | OUTPATIENT
Start: 2023-09-27

## 2023-09-27 SDOH — ECONOMIC STABILITY: FOOD INSECURITY: WITHIN THE PAST 12 MONTHS, THE FOOD YOU BOUGHT JUST DIDN'T LAST AND YOU DIDN'T HAVE MONEY TO GET MORE.: NEVER TRUE

## 2023-09-27 SDOH — ECONOMIC STABILITY: FOOD INSECURITY: WITHIN THE PAST 12 MONTHS, YOU WORRIED THAT YOUR FOOD WOULD RUN OUT BEFORE YOU GOT MONEY TO BUY MORE.: NEVER TRUE

## 2023-09-27 SDOH — ECONOMIC STABILITY: INCOME INSECURITY: HOW HARD IS IT FOR YOU TO PAY FOR THE VERY BASICS LIKE FOOD, HOUSING, MEDICAL CARE, AND HEATING?: NOT HARD AT ALL

## 2023-09-27 SDOH — ECONOMIC STABILITY: HOUSING INSECURITY
IN THE LAST 12 MONTHS, WAS THERE A TIME WHEN YOU DID NOT HAVE A STEADY PLACE TO SLEEP OR SLEPT IN A SHELTER (INCLUDING NOW)?: NO

## 2023-09-27 ASSESSMENT — PATIENT HEALTH QUESTIONNAIRE - PHQ9
SUM OF ALL RESPONSES TO PHQ QUESTIONS 1-9: 0
SUM OF ALL RESPONSES TO PHQ QUESTIONS 1-9: 0
2. FEELING DOWN, DEPRESSED OR HOPELESS: 0
1. LITTLE INTEREST OR PLEASURE IN DOING THINGS: 0
SUM OF ALL RESPONSES TO PHQ QUESTIONS 1-9: 0
SUM OF ALL RESPONSES TO PHQ QUESTIONS 1-9: 0
SUM OF ALL RESPONSES TO PHQ9 QUESTIONS 1 & 2: 0
DEPRESSION UNABLE TO ASSESS: FUNCTIONAL CAPACITY MOTIVATION LIMITS ACCURACY

## 2023-10-05 ENCOUNTER — TELEPHONE (OUTPATIENT)
Dept: AUDIOLOGY | Age: 83
End: 2023-10-05

## 2023-10-05 NOTE — TELEPHONE ENCOUNTER
Patient's family member called and left a voicemail message. Returned patient's cousin's message. Left a voicemail for the patient's cousin to call us back.     Electronically signed by Janett Apple on 10/5/2023 at 9:09 AM

## 2023-10-12 ENCOUNTER — TELEPHONE (OUTPATIENT)
Dept: AUDIOLOGY | Age: 83
End: 2023-10-12

## 2023-10-12 ENCOUNTER — PROCEDURE VISIT (OUTPATIENT)
Dept: AUDIOLOGY | Age: 83
End: 2023-10-12

## 2023-10-12 DIAGNOSIS — H90.3 SENSORINEURAL HEARING LOSS, BILATERAL: Primary | ICD-10-CM

## 2023-10-12 PROCEDURE — 99024 POSTOP FOLLOW-UP VISIT: CPT | Performed by: AUDIOLOGIST

## 2023-10-12 NOTE — TELEPHONE ENCOUNTER
Patient needs ear cleaned. Creating issues with hearing aids. Will take appointment with whomever has first available.   Please call her cousin to schedule:  BRAYAN Barros/DEEDEE  Audiologist  [id number]  NPI#:  [id number]

## 2023-10-12 NOTE — PROGRESS NOTES
Patient's hearing aids cleaned and checked. Referral to ENT for ear cleaning was sent today.     Robert Wagner CCC/DEEDEE  Audiologist  [id number]  NPI#:  [id number]

## 2023-11-15 ENCOUNTER — OFFICE VISIT (OUTPATIENT)
Dept: ENT CLINIC | Age: 83
End: 2023-11-15
Payer: MEDICARE

## 2023-11-15 ENCOUNTER — PROCEDURE VISIT (OUTPATIENT)
Dept: AUDIOLOGY | Age: 83
End: 2023-11-15

## 2023-11-15 VITALS
HEIGHT: 59 IN | BODY MASS INDEX: 27.86 KG/M2 | HEART RATE: 89 BPM | SYSTOLIC BLOOD PRESSURE: 145 MMHG | DIASTOLIC BLOOD PRESSURE: 84 MMHG

## 2023-11-15 DIAGNOSIS — H90.3 SENSORINEURAL HEARING LOSS, BILATERAL: Primary | ICD-10-CM

## 2023-11-15 DIAGNOSIS — H90.3 SENSORINEURAL HEARING LOSS (SNHL) OF BOTH EARS: Primary | ICD-10-CM

## 2023-11-15 PROCEDURE — G8427 DOCREV CUR MEDS BY ELIG CLIN: HCPCS | Performed by: OTOLARYNGOLOGY

## 2023-11-15 PROCEDURE — 3078F DIAST BP <80 MM HG: CPT | Performed by: OTOLARYNGOLOGY

## 2023-11-15 PROCEDURE — 1036F TOBACCO NON-USER: CPT | Performed by: OTOLARYNGOLOGY

## 2023-11-15 PROCEDURE — G8400 PT W/DXA NO RESULTS DOC: HCPCS | Performed by: OTOLARYNGOLOGY

## 2023-11-15 PROCEDURE — 1123F ACP DISCUSS/DSCN MKR DOCD: CPT | Performed by: OTOLARYNGOLOGY

## 2023-11-15 PROCEDURE — 3074F SYST BP LT 130 MM HG: CPT | Performed by: OTOLARYNGOLOGY

## 2023-11-15 PROCEDURE — G8417 CALC BMI ABV UP PARAM F/U: HCPCS | Performed by: OTOLARYNGOLOGY

## 2023-11-15 PROCEDURE — 1090F PRES/ABSN URINE INCON ASSESS: CPT | Performed by: OTOLARYNGOLOGY

## 2023-11-15 PROCEDURE — G8484 FLU IMMUNIZE NO ADMIN: HCPCS | Performed by: OTOLARYNGOLOGY

## 2023-11-15 PROCEDURE — 99213 OFFICE O/P EST LOW 20 MIN: CPT | Performed by: OTOLARYNGOLOGY

## 2023-11-15 PROCEDURE — 99024 POSTOP FOLLOW-UP VISIT: CPT | Performed by: AUDIOLOGIST

## 2023-11-15 ASSESSMENT — ENCOUNTER SYMPTOMS
STRIDOR: 0
SINUS PAIN: 0
SHORTNESS OF BREATH: 0
EYES NEGATIVE: 1
RHINORRHEA: 0
RESPIRATORY NEGATIVE: 1
SINUS PRESSURE: 0

## 2023-11-15 NOTE — PROGRESS NOTES
including pertinent history and exam findings with the resident. I have seen and examined the patient and the key elements of the encounter have been performed by me. I agree with the assessment, plan and orders as documented by the resident. Patient here for follow up of medical problems. Remainder of medical problems as per resident note.       [facility],   11/16/23

## 2023-11-15 NOTE — PROGRESS NOTES
Hearing aids cleaned and checked. No other issues noted. Patient to return PRN.     Robert Wagner CCC/DEEDEE  Audiologist  [id number]  NPI#:  [id number]

## 2023-12-07 ENCOUNTER — PROCEDURE VISIT (OUTPATIENT)
Dept: AUDIOLOGY | Age: 83
End: 2023-12-07

## 2023-12-07 DIAGNOSIS — H90.3 SENSORINEURAL HEARING LOSS, BILATERAL: Primary | ICD-10-CM

## 2023-12-07 PROCEDURE — 99024 POSTOP FOLLOW-UP VISIT: CPT | Performed by: AUDIOLOGIST

## 2023-12-07 NOTE — PROGRESS NOTES
Patient seen for hearing aid check. Hearing aids cleaned/checked. No issues noted. Patient to return PRN.     Mony Lanza CCC/DEEDEE  Audiologist  [id number]  NPI#:  [id number]

## 2024-01-08 ENCOUNTER — PROCEDURE VISIT (OUTPATIENT)
Dept: AUDIOLOGY | Age: 84
End: 2024-01-08

## 2024-01-08 DIAGNOSIS — H90.3 SENSORINEURAL HEARING LOSS, BILATERAL: Primary | ICD-10-CM

## 2024-01-08 PROCEDURE — 99024 POSTOP FOLLOW-UP VISIT: CPT | Performed by: AUDIOLOGIST

## 2024-01-08 NOTE — PROGRESS NOTES
Patient seen for hearing aid cleaning/check.  No other issues noted.  Patient scheduled to return in 4 weeks, for hearing aid cleaning.    Lowell Horner CCC/A  Audiologist  [id number]  NPI#:  [id number]

## 2024-02-05 ENCOUNTER — PROCEDURE VISIT (OUTPATIENT)
Dept: AUDIOLOGY | Age: 84
End: 2024-02-05

## 2024-02-05 DIAGNOSIS — H90.3 SENSORY HEARING LOSS, BILATERAL: Primary | ICD-10-CM

## 2024-02-05 NOTE — PROGRESS NOTES
Hearing aid check.  She reported being very displeased with the hearing aids.  She noted having to have numerous appts and ear molds not fitting correctly and cut her ear.  She then had to go to  for ear issues due to her ear mold.      Today, Her right hearing aid is  not charging.  Her plug is also loose in the back of her .      Right will need repaired and will need to ask for replacement .  She will bring old  back to exchange.      Left hearing aid, attempted to increase gain.  Hearing aids are already up in volume.     Recommend power speaker unit be installed in ear molds.  Explained they would have to be sent to co.  Rec get right one switched out while in for repair, and then will have to send the left one out .    She noted she just wants a whole brand new set of hearing aids for all the troubles she has had.   I will relay information to Lowell.      Electronically signed by Janett Sheikh on 2/5/2024 at 12:48 PM

## 2024-02-07 ENCOUNTER — FOLLOWUP TELEPHONE ENCOUNTER (OUTPATIENT)
Dept: AUDIOLOGY | Age: 84
End: 2024-02-07

## 2024-02-07 NOTE — TELEPHONE ENCOUNTER
The patient called and left 2 voicemails to check on her hearing aid repair that she spoke to [place] about on Monday.  She also reported that her hearing aids do not fit well and have not for a long time. Please call the patient back to update her on her hearing aids/address the fit issue.    Electronically signed by Janett Perez on 2/7/2024 at 4:45 PM

## 2024-02-08 ENCOUNTER — TELEPHONE (OUTPATIENT)
Dept: AUDIOLOGY | Age: 84
End: 2024-02-08

## 2024-02-08 NOTE — TELEPHONE ENCOUNTER
Spoke to patient regarding hearing aid repair that was sent for repair.  Explained to patient that we would call her when hearing aid arrives from repair.  Will send other hearing aid and  in for repair when this one is returned.    Again reiterated to patient, that is continuing to call the wrong number to leave messages for [facility].  Gave her contact information again.      Lowell Horner CCC/DEEDEE  Audiologist  [id number]  NPI#:  [id number]

## 2024-03-11 ENCOUNTER — PROCEDURE VISIT (OUTPATIENT)
Dept: AUDIOLOGY | Age: 84
End: 2024-03-11

## 2024-03-11 DIAGNOSIS — H90.3 SENSORINEURAL HEARING LOSS, BILATERAL: Primary | ICD-10-CM

## 2024-03-11 PROCEDURE — 99024 POSTOP FOLLOW-UP VISIT: CPT | Performed by: AUDIOLOGIST

## 2024-03-12 NOTE — PROGRESS NOTES
Right repaired/replaced hearing aid picked up, by patient.  Left hearing aid to be sent to [employer] for repair/replacement.  Will contact department, when returned to department.    Lowell Horner CCC/A  Audiologist  [id number]  NPI#:  [id number]

## 2024-03-13 ENCOUNTER — TELEPHONE (OUTPATIENT)
Dept: AUDIOLOGY | Age: 84
End: 2024-03-13

## 2024-03-13 NOTE — TELEPHONE ENCOUNTER
Patient called and lvm stating that her hearing aid is not working with her cell phone.  Called back and spoke to the sister-in-law gave her the [facility] phone number for Caitlyn to call.     Electronically signed by Janett Perez on 3/13/2024 at 4:41 PM

## 2024-03-18 ENCOUNTER — TELEPHONE (OUTPATIENT)
Dept: ENT CLINIC | Age: 84
End: 2024-03-18

## 2024-03-18 NOTE — TELEPHONE ENCOUNTER
Left message with Linnea requesting return call. There is no current referral in chart. Due to change in provider status if patient wishes to discuss Cochlear implant, can offer alternative locations.

## 2024-03-19 ENCOUNTER — PROCEDURE VISIT (OUTPATIENT)
Dept: AUDIOLOGY | Age: 84
End: 2024-03-19

## 2024-03-19 DIAGNOSIS — H90.3 SENSORINEURAL HEARING LOSS, BILATERAL: Primary | ICD-10-CM

## 2024-03-19 PROCEDURE — 99024 POSTOP FOLLOW-UP VISIT: CPT | Performed by: AUDIOLOGIST

## 2024-03-19 NOTE — PROGRESS NOTES
Right hearing aid cleaned/checked.  No issues noted.  Patient will be contacted when left hearing aid is returned from repair.    Lowell Horner CCC/DEEDEE  Audiologist  [id number]  NPI#:  [id number]

## 2024-03-28 ENCOUNTER — TRANSCRIBE ORDERS (OUTPATIENT)
Age: 84
End: 2024-03-28

## 2024-03-28 DIAGNOSIS — R55 SYNCOPE AND COLLAPSE: Primary | ICD-10-CM

## 2024-04-01 ENCOUNTER — TELEPHONE (OUTPATIENT)
Dept: AUDIOLOGY | Age: 84
End: 2024-04-01

## 2024-04-01 NOTE — TELEPHONE ENCOUNTER
Left voice mail for patient contact to call and schedule audiology appointment, to  hearing aid.    Lowell Horner CCC/A  Audiologist  [id number]  NPI#:  [id number]

## 2024-04-03 ENCOUNTER — HOSPITAL ENCOUNTER (OUTPATIENT)
Age: 84
Discharge: HOME | End: 2024-04-05
Payer: MEDICARE

## 2024-04-03 ENCOUNTER — HOSPITAL ENCOUNTER (OUTPATIENT)
Age: 84
Discharge: HOME | End: 2024-04-03
Payer: MEDICARE

## 2024-04-03 DIAGNOSIS — R55 SYNCOPE AND COLLAPSE: ICD-10-CM

## 2024-04-03 LAB
DATE LAST DOSE: ABNORMAL
DIGOXIN DOSE TIME: ABNORMAL
DIGOXIN DOSE: ABNORMAL MG
DIGOXIN SERPL-MCNC: 0.6 NG/ML (ref 0.8–2)

## 2024-04-03 PROCEDURE — 80162 ASSAY OF DIGOXIN TOTAL: CPT

## 2024-04-03 PROCEDURE — 36415 COLL VENOUS BLD VENIPUNCTURE: CPT

## 2024-04-03 PROCEDURE — 93225 XTRNL ECG REC<48 HRS REC: CPT

## 2024-04-10 ENCOUNTER — PROCEDURE VISIT (OUTPATIENT)
Dept: AUDIOLOGY | Age: 84
End: 2024-04-10

## 2024-04-10 DIAGNOSIS — H90.3 SENSORINEURAL HEARING LOSS, BILATERAL: Primary | ICD-10-CM

## 2024-04-10 PROCEDURE — 99024 POSTOP FOLLOW-UP VISIT: CPT | Performed by: AUDIOLOGIST

## 2024-04-10 NOTE — PROGRESS NOTES
Patient seen for pickup of left hearing aid repair.    No issues noted.    Lowell Horner CCC/A  Audiologist  [id number]  NPI#:  [id number]

## 2024-04-23 ENCOUNTER — PROCEDURE VISIT (OUTPATIENT)
Dept: AUDIOLOGY | Age: 84
End: 2024-04-23

## 2024-04-23 DIAGNOSIS — H90.3 SENSORINEURAL HEARING LOSS, BILATERAL: Primary | ICD-10-CM

## 2024-04-23 PROCEDURE — 99024 POSTOP FOLLOW-UP VISIT: CPT | Performed by: AUDIOLOGIST

## 2024-04-23 NOTE — PROGRESS NOTES
Hearing aids cleaned and checked.  No issues noted.  Patient to return in 5 weeks.    Lowell Horner CCC/A  Audiologist  [id number]  NPI#:  [id number]

## 2024-05-13 ENCOUNTER — PROCEDURE VISIT (OUTPATIENT)
Dept: AUDIOLOGY | Age: 84
End: 2024-05-13

## 2024-05-13 DIAGNOSIS — H90.3 SENSORINEURAL HEARING LOSS, BILATERAL: Primary | ICD-10-CM

## 2024-05-13 PROCEDURE — 99024 POSTOP FOLLOW-UP VISIT: CPT | Performed by: AUDIOLOGIST

## 2024-05-13 NOTE — PROGRESS NOTES
Patient seen for hearing aid check.  Patient reported one day where there was no sound from the left hearing aid.  Hearing aids cleaned and no issues noted.    Lowell Horner CCC/A  Audiologist  [id number]  NPI#:  [id number]

## 2024-05-21 ENCOUNTER — APPOINTMENT (OUTPATIENT)
Dept: GENERAL RADIOLOGY | Age: 84
End: 2024-05-21
Payer: MEDICARE

## 2024-05-21 ENCOUNTER — APPOINTMENT (OUTPATIENT)
Dept: CT IMAGING | Age: 84
End: 2024-05-21
Payer: MEDICARE

## 2024-05-21 ENCOUNTER — HOSPITAL ENCOUNTER (INPATIENT)
Age: 84
LOS: 4 days | Discharge: NURSING, REHAB OR LONG-TERM CARE | End: 2024-05-25
Attending: EMERGENCY MEDICINE | Admitting: STUDENT IN AN ORGANIZED HEALTH CARE EDUCATION/TRAINING PROGRAM
Payer: MEDICARE

## 2024-05-21 DIAGNOSIS — I50.9 NEW ONSET OF CONGESTIVE HEART FAILURE (HCC): Primary | ICD-10-CM

## 2024-05-21 DIAGNOSIS — R06.02 SHORTNESS OF BREATH: ICD-10-CM

## 2024-05-21 DIAGNOSIS — M19.012 PRIMARY OSTEOARTHRITIS OF LEFT SHOULDER: ICD-10-CM

## 2024-05-21 DIAGNOSIS — I16.0 HYPERTENSIVE URGENCY: ICD-10-CM

## 2024-05-21 PROBLEM — I50.21 ACUTE SYSTOLIC CHF (CONGESTIVE HEART FAILURE) (HCC): Status: ACTIVE | Noted: 2024-05-21

## 2024-05-21 LAB
ALBUMIN SERPL-MCNC: 3.6 G/DL (ref 3.5–5.2)
ALP SERPL-CCNC: 122 U/L (ref 35–104)
ALT SERPL-CCNC: 56 U/L (ref 0–32)
ANION GAP SERPL CALCULATED.3IONS-SCNC: 12 MMOL/L (ref 7–16)
AST SERPL-CCNC: 40 U/L (ref 0–31)
BASOPHILS # BLD: 0.02 K/UL (ref 0–0.2)
BASOPHILS NFR BLD: 0 % (ref 0–2)
BILIRUB SERPL-MCNC: 0.5 MG/DL (ref 0–1.2)
BNP SERPL-MCNC: 5362 PG/ML (ref 0–450)
BUN SERPL-MCNC: 22 MG/DL (ref 6–23)
CALCIUM SERPL-MCNC: 9.5 MG/DL (ref 8.6–10.2)
CHLORIDE SERPL-SCNC: 110 MMOL/L (ref 98–107)
CO2 SERPL-SCNC: 22 MMOL/L (ref 22–29)
CREAT SERPL-MCNC: 1 MG/DL (ref 0.5–1)
DATE LAST DOSE: NORMAL
DIGOXIN DOSE TIME: NORMAL
DIGOXIN DOSE: NORMAL MG
DIGOXIN SERPL-MCNC: 1 NG/ML (ref 0.8–2)
EOSINOPHIL # BLD: 0.22 K/UL (ref 0.05–0.5)
EOSINOPHILS RELATIVE PERCENT: 3 % (ref 0–6)
ERYTHROCYTE [DISTWIDTH] IN BLOOD BY AUTOMATED COUNT: 15.7 % (ref 11.5–15)
GFR, ESTIMATED: 56 ML/MIN/1.73M2
GLUCOSE SERPL-MCNC: 103 MG/DL (ref 74–99)
HCT VFR BLD AUTO: 43.6 % (ref 34–48)
HGB BLD-MCNC: 14 G/DL (ref 11.5–15.5)
IMM GRANULOCYTES # BLD AUTO: 0.03 K/UL (ref 0–0.58)
IMM GRANULOCYTES NFR BLD: 0 % (ref 0–5)
LYMPHOCYTES NFR BLD: 0.86 K/UL (ref 1.5–4)
LYMPHOCYTES RELATIVE PERCENT: 11 % (ref 20–42)
MAGNESIUM SERPL-MCNC: 2.1 MG/DL (ref 1.6–2.6)
MCH RBC QN AUTO: 31 PG (ref 26–35)
MCHC RBC AUTO-ENTMCNC: 32.1 G/DL (ref 32–34.5)
MCV RBC AUTO: 96.7 FL (ref 80–99.9)
MONOCYTES NFR BLD: 0.56 K/UL (ref 0.1–0.95)
MONOCYTES NFR BLD: 7 % (ref 2–12)
NEUTROPHILS NFR BLD: 78 % (ref 43–80)
NEUTS SEG NFR BLD: 5.97 K/UL (ref 1.8–7.3)
PLATELET # BLD AUTO: 206 K/UL (ref 130–450)
PMV BLD AUTO: 10.2 FL (ref 7–12)
POTASSIUM SERPL-SCNC: 4.4 MMOL/L (ref 3.5–5)
POTASSIUM SERPL-SCNC: 4.6 MMOL/L (ref 3.5–5)
PROT SERPL-MCNC: 6 G/DL (ref 6.4–8.3)
RBC # BLD AUTO: 4.51 M/UL (ref 3.5–5.5)
SODIUM SERPL-SCNC: 144 MMOL/L (ref 132–146)
TROPONIN I SERPL HS-MCNC: 28 NG/L (ref 0–9)
TROPONIN I SERPL HS-MCNC: 29 NG/L (ref 0–9)
WBC OTHER # BLD: 7.7 K/UL (ref 4.5–11.5)

## 2024-05-21 PROCEDURE — 72125 CT NECK SPINE W/O DYE: CPT

## 2024-05-21 PROCEDURE — 99233 SBSQ HOSP IP/OBS HIGH 50: CPT | Performed by: STUDENT IN AN ORGANIZED HEALTH CARE EDUCATION/TRAINING PROGRAM

## 2024-05-21 PROCEDURE — 99285 EMERGENCY DEPT VISIT HI MDM: CPT

## 2024-05-21 PROCEDURE — 6370000000 HC RX 637 (ALT 250 FOR IP): Performed by: EMERGENCY MEDICINE

## 2024-05-21 PROCEDURE — 6360000002 HC RX W HCPCS: Performed by: EMERGENCY MEDICINE

## 2024-05-21 PROCEDURE — 93005 ELECTROCARDIOGRAM TRACING: CPT | Performed by: EMERGENCY MEDICINE

## 2024-05-21 PROCEDURE — 73030 X-RAY EXAM OF SHOULDER: CPT

## 2024-05-21 PROCEDURE — 2060000000 HC ICU INTERMEDIATE R&B

## 2024-05-21 PROCEDURE — 83880 ASSAY OF NATRIURETIC PEPTIDE: CPT

## 2024-05-21 PROCEDURE — 80053 COMPREHEN METABOLIC PANEL: CPT

## 2024-05-21 PROCEDURE — 2580000003 HC RX 258: Performed by: STUDENT IN AN ORGANIZED HEALTH CARE EDUCATION/TRAINING PROGRAM

## 2024-05-21 PROCEDURE — 80162 ASSAY OF DIGOXIN TOTAL: CPT

## 2024-05-21 PROCEDURE — 85025 COMPLETE CBC W/AUTO DIFF WBC: CPT

## 2024-05-21 PROCEDURE — 99223 1ST HOSP IP/OBS HIGH 75: CPT | Performed by: STUDENT IN AN ORGANIZED HEALTH CARE EDUCATION/TRAINING PROGRAM

## 2024-05-21 PROCEDURE — 71046 X-RAY EXAM CHEST 2 VIEWS: CPT

## 2024-05-21 PROCEDURE — 83735 ASSAY OF MAGNESIUM: CPT

## 2024-05-21 PROCEDURE — 84132 ASSAY OF SERUM POTASSIUM: CPT

## 2024-05-21 PROCEDURE — 70450 CT HEAD/BRAIN W/O DYE: CPT

## 2024-05-21 PROCEDURE — 84484 ASSAY OF TROPONIN QUANT: CPT

## 2024-05-21 PROCEDURE — 6370000000 HC RX 637 (ALT 250 FOR IP): Performed by: INTERNAL MEDICINE

## 2024-05-21 RX ORDER — ONDANSETRON 2 MG/ML
4 INJECTION INTRAMUSCULAR; INTRAVENOUS EVERY 6 HOURS PRN
Status: DISCONTINUED | OUTPATIENT
Start: 2024-05-21 | End: 2024-05-25 | Stop reason: HOSPADM

## 2024-05-21 RX ORDER — FUROSEMIDE 10 MG/ML
40 INJECTION INTRAMUSCULAR; INTRAVENOUS 2 TIMES DAILY
Status: DISCONTINUED | OUTPATIENT
Start: 2024-05-21 | End: 2024-05-22

## 2024-05-21 RX ORDER — SODIUM CHLORIDE 0.9 % (FLUSH) 0.9 %
5-40 SYRINGE (ML) INJECTION PRN
Status: DISCONTINUED | OUTPATIENT
Start: 2024-05-21 | End: 2024-05-25 | Stop reason: HOSPADM

## 2024-05-21 RX ORDER — POTASSIUM CHLORIDE 20 MEQ/1
40 TABLET, EXTENDED RELEASE ORAL PRN
Status: DISCONTINUED | OUTPATIENT
Start: 2024-05-21 | End: 2024-05-25 | Stop reason: HOSPADM

## 2024-05-21 RX ORDER — HYDRALAZINE HYDROCHLORIDE 20 MG/ML
20 INJECTION INTRAMUSCULAR; INTRAVENOUS EVERY 6 HOURS PRN
Status: DISCONTINUED | OUTPATIENT
Start: 2024-05-21 | End: 2024-05-25 | Stop reason: HOSPADM

## 2024-05-21 RX ORDER — SODIUM CHLORIDE 0.9 % (FLUSH) 0.9 %
5-40 SYRINGE (ML) INJECTION EVERY 12 HOURS SCHEDULED
Status: DISCONTINUED | OUTPATIENT
Start: 2024-05-21 | End: 2024-05-25 | Stop reason: HOSPADM

## 2024-05-21 RX ORDER — CARVEDILOL 3.12 MG/1
3.12 TABLET ORAL 2 TIMES DAILY WITH MEALS
Status: DISCONTINUED | OUTPATIENT
Start: 2024-05-21 | End: 2024-05-24

## 2024-05-21 RX ORDER — ACETAMINOPHEN 650 MG/1
650 SUPPOSITORY RECTAL EVERY 6 HOURS PRN
Status: DISCONTINUED | OUTPATIENT
Start: 2024-05-21 | End: 2024-05-25 | Stop reason: HOSPADM

## 2024-05-21 RX ORDER — POLYETHYLENE GLYCOL 3350 17 G/17G
17 POWDER, FOR SOLUTION ORAL DAILY PRN
Status: DISCONTINUED | OUTPATIENT
Start: 2024-05-21 | End: 2024-05-25 | Stop reason: HOSPADM

## 2024-05-21 RX ORDER — POTASSIUM CHLORIDE 7.45 MG/ML
10 INJECTION INTRAVENOUS PRN
Status: DISCONTINUED | OUTPATIENT
Start: 2024-05-21 | End: 2024-05-25 | Stop reason: HOSPADM

## 2024-05-21 RX ORDER — ACETAMINOPHEN 325 MG/1
650 TABLET ORAL EVERY 6 HOURS PRN
Status: DISCONTINUED | OUTPATIENT
Start: 2024-05-21 | End: 2024-05-25 | Stop reason: HOSPADM

## 2024-05-21 RX ORDER — ACETAMINOPHEN 325 MG/1
650 TABLET ORAL EVERY 4 HOURS PRN
Status: DISCONTINUED | OUTPATIENT
Start: 2024-05-21 | End: 2024-05-25 | Stop reason: HOSPADM

## 2024-05-21 RX ORDER — VALSARTAN 40 MG/1
40 TABLET ORAL DAILY
Status: DISCONTINUED | OUTPATIENT
Start: 2024-05-22 | End: 2024-05-21

## 2024-05-21 RX ORDER — ACETAMINOPHEN 500 MG
1000 TABLET ORAL ONCE
Status: COMPLETED | OUTPATIENT
Start: 2024-05-21 | End: 2024-05-21

## 2024-05-21 RX ORDER — DIGOXIN 125 MCG
125 TABLET ORAL
Status: DISCONTINUED | OUTPATIENT
Start: 2024-05-22 | End: 2024-05-22

## 2024-05-21 RX ORDER — SODIUM CHLORIDE 9 MG/ML
INJECTION, SOLUTION INTRAVENOUS PRN
Status: DISCONTINUED | OUTPATIENT
Start: 2024-05-21 | End: 2024-05-25 | Stop reason: HOSPADM

## 2024-05-21 RX ORDER — VALSARTAN 40 MG/1
40 TABLET ORAL DAILY
Status: DISCONTINUED | OUTPATIENT
Start: 2024-05-21 | End: 2024-05-25 | Stop reason: HOSPADM

## 2024-05-21 RX ORDER — ONDANSETRON 4 MG/1
4 TABLET, ORALLY DISINTEGRATING ORAL EVERY 8 HOURS PRN
Status: DISCONTINUED | OUTPATIENT
Start: 2024-05-21 | End: 2024-05-25 | Stop reason: HOSPADM

## 2024-05-21 RX ORDER — CARVEDILOL 3.12 MG/1
3.12 TABLET ORAL 2 TIMES DAILY WITH MEALS
Status: DISCONTINUED | OUTPATIENT
Start: 2024-05-22 | End: 2024-05-21

## 2024-05-21 RX ORDER — DIGOXIN 125 MCG
125 TABLET ORAL
Status: DISCONTINUED | OUTPATIENT
Start: 2024-05-23 | End: 2024-05-21

## 2024-05-21 RX ORDER — LABETALOL HYDROCHLORIDE 5 MG/ML
20 INJECTION, SOLUTION INTRAVENOUS EVERY 4 HOURS PRN
Status: DISCONTINUED | OUTPATIENT
Start: 2024-05-21 | End: 2024-05-21

## 2024-05-21 RX ORDER — KETOROLAC TROMETHAMINE 15 MG/ML
15 INJECTION, SOLUTION INTRAMUSCULAR; INTRAVENOUS ONCE
Status: COMPLETED | OUTPATIENT
Start: 2024-05-21 | End: 2024-05-21

## 2024-05-21 RX ORDER — MAGNESIUM SULFATE IN WATER 40 MG/ML
2000 INJECTION, SOLUTION INTRAVENOUS PRN
Status: DISCONTINUED | OUTPATIENT
Start: 2024-05-21 | End: 2024-05-25 | Stop reason: HOSPADM

## 2024-05-21 RX ADMIN — VALSARTAN 40 MG: 40 TABLET, FILM COATED ORAL at 22:45

## 2024-05-21 RX ADMIN — ACETAMINOPHEN 1000 MG: 500 TABLET ORAL at 14:28

## 2024-05-21 RX ADMIN — CARVEDILOL 3.12 MG: 3.12 TABLET, FILM COATED ORAL at 22:45

## 2024-05-21 RX ADMIN — KETOROLAC TROMETHAMINE 15 MG: 15 INJECTION, SOLUTION INTRAMUSCULAR; INTRAVENOUS at 19:02

## 2024-05-21 RX ADMIN — Medication 10 ML: at 22:45

## 2024-05-21 ASSESSMENT — LIFESTYLE VARIABLES
HOW OFTEN DO YOU HAVE A DRINK CONTAINING ALCOHOL: NEVER
HOW MANY STANDARD DRINKS CONTAINING ALCOHOL DO YOU HAVE ON A TYPICAL DAY: PATIENT DOES NOT DRINK

## 2024-05-21 NOTE — H&P
for or 8/5/2015    Ureteral perforation secondary to stent manipulation 05/08/2015        Past Surgical History:     Past Surgical History:   Procedure Laterality Date    CHOLECYSTECTOMY N/A     HYSTERECTOMY (CERVIX STATUS UNKNOWN) N/A     LITHOTRIPSY Left 08/05/2015    LEFT RENAL CALCULI    OTHER SURGICAL HISTORY  05/08/2015    Cystoscopy retrograde pyelogram    SKIN BIOPSY      skin cancer nose    TOTAL HIP ARTHROPLASTY Right 06/02/2014    right hip ZANE Clayton MD    TOTAL HIP ARTHROPLASTY Left 07/18/2016    left hip ZANE Clayton MD        Medications Prior to Admission:     Prior to Admission medications    Medication Sig Start Date End Date Taking? Authorizing Provider   methylPREDNISolone (MEDROL DOSEPACK) 4 MG tablet Take by mouth. 9/27/23   Alejandro Avila APRN - CNP   loratadine (CLARITIN) 10 MG tablet Take 1 tablet by mouth daily 9/27/23   Alejandro Avila APRN - CNP   triamcinolone (KENALOG) 0.025 % cream Apply Topically to both ears for itching 2-3 times per week as needed.  Stop when symptoms resolve. 10/12/21   Fercho Reyes APRN - CNP   amLODIPine (NORVASC) 5 MG tablet TK 1 T PO QD 1/23/20   Destin Mueller MD   rivaroxaban (XARELTO) 10 MG TABS tablet Take 1 tablet by mouth    Destin Mueller MD   digoxin (LANOXIN) 125 MCG tablet Take 1 tablet by mouth daily 5/1/19   Tai Massey MD   Multiple Vitamins-Minerals (RA VISION-MARIA ELENA PRESERVE PO) Take by mouth daily LD 7/12/16    Destin Mueller MD        Allergies:     Iodine, Doxycycline, Other, Cefoxitin sodium in dextrose, Hycomine compound [pe-cpm-hydrocod-apap-caff], Iodine, and Mefoxin [cefoxitin]    Social History:     Tobacco:    reports that she has never smoked. She has never used smokeless tobacco.  Alcohol:      reports current alcohol use.  Drug Use:  reports no history of drug use.    Family History:     Family History   Problem Relation Age of Onset    Kidney Disease Mother     Heart Disease Father        Review of  20.0 - 42.0 %    Monocytes % 7 2.0 - 12.0 %    Eosinophils % 3 0 - 6 %    Basophils % 0 0.0 - 2.0 %    Immature Granulocytes % 0 0.0 - 5.0 %    Neutrophils Absolute 5.97 1.80 - 7.30 k/uL    Lymphocytes Absolute 0.86 (L) 1.50 - 4.00 k/uL    Monocytes Absolute 0.56 0.10 - 0.95 k/uL    Eosinophils Absolute 0.22 0.05 - 0.50 k/uL    Basophils Absolute 0.02 0.00 - 0.20 k/uL    Immature Granulocytes Absolute 0.03 0.00 - 0.58 k/uL   CMP    Collection Time: 05/21/24  2:07 PM   Result Value Ref Range    Sodium 144 132 - 146 mmol/L    Potassium 4.6 3.5 - 5.0 mmol/L    Chloride 110 (H) 98 - 107 mmol/L    CO2 22 22 - 29 mmol/L    Anion Gap 12 7 - 16 mmol/L    Glucose 103 (H) 74 - 99 mg/dL    BUN 22 6 - 23 mg/dL    Creatinine 1.0 0.50 - 1.00 mg/dL    Est, Glom Filt Rate 56 (L) >60 mL/min/1.73m2    Calcium 9.5 8.6 - 10.2 mg/dL    Total Protein 6.0 (L) 6.4 - 8.3 g/dL    Albumin 3.6 3.5 - 5.2 g/dL    Total Bilirubin 0.5 0.0 - 1.2 mg/dL    Alkaline Phosphatase 122 (H) 35 - 104 U/L    ALT 56 (H) 0 - 32 U/L    AST 40 (H) 0 - 31 U/L   Magnesium    Collection Time: 05/21/24  2:07 PM   Result Value Ref Range    Magnesium 2.1 1.6 - 2.6 mg/dL   Digoxin Level    Collection Time: 05/21/24  2:07 PM   Result Value Ref Range    Digoxin Lvl 1.0 0.8 - 2.0 ng/mL    Digoxin Dose Amount Unknown     Digoxin Date Last Dose UNK^Unknown^L     Digoxin Dose Time UNK^Unknown^L    Brain Natriuretic Peptide    Collection Time: 05/21/24  2:07 PM   Result Value Ref Range    Pro-BNP 5,362 (H) 0 - 450 pg/mL   EKG 12 Lead    Collection Time: 05/21/24  2:23 PM   Result Value Ref Range    Ventricular Rate 92 BPM    Atrial Rate 144 BPM    QRS Duration 66 ms    Q-T Interval 310 ms    QTc Calculation (Bazett) 383 ms    R Axis -32 degrees    T Axis 154 degrees   Troponin    Collection Time: 05/21/24  3:28 PM   Result Value Ref Range    Troponin, High Sensitivity 28 (H) 0 - 9 ng/L       Imaging/Diagnostics:  CT CSpine W/O Contrast    Result Date: 5/21/2024  No acute

## 2024-05-21 NOTE — ED NOTES
Patient arrived in the ED, found to have bedbugs. Patient was then decontaminated per protocol. Belongings are in double bags on the back of bed.

## 2024-05-21 NOTE — ED PROVIDER NOTES
ED PROVIDER NOTE    Chief Complaint   Patient presents with    Joint Pain     Patient reporting joint pain to all extremities. Worse in the left arm.        HPI:  5/21/24,   Time: 1:09 PM EDT       Caitlyn Mitchell is a 84 y.o. female presenting to the ED for left shoulder pain and left arm weakness.  Gradual onset at 3 AM this morning, progressively worsening, moderate in severity.  Pain is in the left shoulder and radiates to the left side of her neck, worse with movement of the left arm.  Also notes additional chronic complaints such as orthopnea and leg swelling that has been ongoing for at least the last 1 month.  She states that she has been on diuretics in the past, however does not tolerate them and therefore does not take any oral diuretics.  Denies associated fever, chills, cough, chest pain, shortness of breath, abdominal pain, nausea, vomiting.  Normal p.o. intake and urine output.  No recent falls or injuries.  Did do some heavy lifting a couple of weeks ago.    Chart review: hx of HTN, afib on xarelto, endometrial ca s/p hysterectomy, OA of both shoulders  Lab Results   Component Value Date    LVEF 76 04/26/2019       Review of Systems:     Review of Systems  Pertinent positives and negatives as stated in HPI     --------------------------------------------- PAST HISTORY ---------------------------------------------  Past Medical History:   Past Medical History:   Diagnosis Date    Anxiety     no medications, becomes short of breath when upset/ anxious    Cancer (HCC) 2013    uterine    DJD (degenerative joint disease) 06/02/2014    Rt. RICHARD 6/2/2014 ZANE Clayton MD    History of Holter monitoring 09/02/2022    Hyperlipidemia     no meds    Hypertension     Irregular heart beat     1 episode while hospitalized for kidney stone    Osteoarthritis 07/18/2016    Lt. RICHARD 7/18/2016 ZANE Clayton MD    Preoperative clearance     Dr Petersen, medical for right hip arthroplasty 6/14    Renal calculi     for or 8/5/2015  NURSING NOTES AND VITALS REVIEWED ---------------------------   The nursing notes within the ED encounter and vital signs as below have been reviewed by myself.  BP (!) 206/121   Pulse (!) 48   Temp 97.8 °F (36.6 °C) (Oral)   Resp 18   LMP  (LMP Unknown)   SpO2 92%   Oxygen Saturation Interpretation: Normal    The patient’s available past medical records and past encounters were reviewed.        ------------------------------ ED COURSE/MEDICAL DECISION MAKING----------------------  Medications   ketorolac (TORADOL) injection 15 mg (has no administration in time range)   acetaminophen (TYLENOL) tablet 1,000 mg (1,000 mg Oral Given 24 1428)     Consultations:             Cardiology - discussed w/ Dr. Feliciano who recommends admission to internal medicine service, diuresis, and echocardiogram.    Independent interpretation of tests:  CXR - trace bilateral pleural effusions    Social determinants of health affecting patient care:  poor medical literacy    The patient presents with a new acute problem or complaint.        Counseling:   The emergency provider has spoken with the patient and discussed today’s results, in addition to providing specific details for the plan of care and counseling regarding the diagnosis and prognosis.  Questions are answered at this time and they are agreeable with the plan.    ED Course/Medical Decision Makin y.o. female here with left shoulder pain and shortness of breath.  On arrival patient is well-appearing and in no acute distress.  She is hypertensive and bradycardic on arrival.  O2 sats 92% on room air at rest.  Patient has difficulty laying backwards due to orthopnea.  Plain films of left shoulder negative for acute fracture or dislocation.  Patient does have significant degenerative changes of bilateral shoulders in the past as well as today on plain films.  CXR concerning for bilateral pleural effusions.  Patient also has elevated proBNP and is in A-fib currently.

## 2024-05-22 ENCOUNTER — APPOINTMENT (OUTPATIENT)
Age: 84
End: 2024-05-22
Attending: STUDENT IN AN ORGANIZED HEALTH CARE EDUCATION/TRAINING PROGRAM
Payer: MEDICARE

## 2024-05-22 LAB
ANION GAP SERPL CALCULATED.3IONS-SCNC: 8 MMOL/L (ref 7–16)
BUN SERPL-MCNC: 27 MG/DL (ref 6–23)
CALCIUM SERPL-MCNC: 8.8 MG/DL (ref 8.6–10.2)
CHLORIDE SERPL-SCNC: 113 MMOL/L (ref 98–107)
CHOLEST SERPL-MCNC: 117 MG/DL
CO2 SERPL-SCNC: 22 MMOL/L (ref 22–29)
CREAT SERPL-MCNC: 1 MG/DL (ref 0.5–1)
ECHO AO ASC DIAM: 2.6 CM
ECHO AO ASCENDING AORTA INDEX: 1.65 CM/M2
ECHO AR MAX VEL PISA: 4.4 M/S
ECHO AV AREA PEAK VELOCITY: 1.9 CM2
ECHO AV AREA VTI: 1.8 CM2
ECHO AV AREA/BSA PEAK VELOCITY: 1.2 CM2/M2
ECHO AV AREA/BSA VTI: 1.1 CM2/M2
ECHO AV CUSP MM: 1.1 CM
ECHO AV MEAN GRADIENT: 7 MMHG
ECHO AV MEAN VELOCITY: 1.3 M/S
ECHO AV PEAK GRADIENT: 13 MMHG
ECHO AV PEAK VELOCITY: 1.8 M/S
ECHO AV REGURGITANT PHT: 626 MILLISECOND
ECHO AV VELOCITY RATIO: 0.61
ECHO AV VTI: 28.7 CM
ECHO BSA: 1.62 M2
ECHO EST RA PRESSURE: 3 MMHG
ECHO LA DIAMETER INDEX: 2.85 CM/M2
ECHO LA DIAMETER: 4.5 CM
ECHO LA VOL A-L A2C: 49 ML (ref 22–52)
ECHO LA VOL A-L A4C: 41 ML (ref 22–52)
ECHO LA VOL BP: 43 ML (ref 22–52)
ECHO LA VOL MOD A2C: 47 ML (ref 22–52)
ECHO LA VOL MOD A4C: 38 ML (ref 22–52)
ECHO LA VOL/BSA BIPLANE: 27 ML/M2 (ref 16–34)
ECHO LA VOLUME AREA LENGTH: 45 ML
ECHO LA VOLUME INDEX A-L A2C: 31 ML/M2 (ref 16–34)
ECHO LA VOLUME INDEX A-L A4C: 26 ML/M2 (ref 16–34)
ECHO LA VOLUME INDEX AREA LENGTH: 28 ML/M2 (ref 16–34)
ECHO LA VOLUME INDEX MOD A2C: 30 ML/M2 (ref 16–34)
ECHO LA VOLUME INDEX MOD A4C: 24 ML/M2 (ref 16–34)
ECHO LV EDV A2C: 42 ML
ECHO LV EDV A4C: 52 ML
ECHO LV EDV BP: 47 ML (ref 56–104)
ECHO LV EDV INDEX A4C: 33 ML/M2
ECHO LV EDV INDEX BP: 30 ML/M2
ECHO LV EDV NDEX A2C: 27 ML/M2
ECHO LV EJECTION FRACTION A2C: 64 %
ECHO LV EJECTION FRACTION A4C: 62 %
ECHO LV EJECTION FRACTION BIPLANE: 62 % (ref 55–100)
ECHO LV ESV A2C: 15 ML
ECHO LV ESV A4C: 20 ML
ECHO LV ESV BP: 18 ML (ref 19–49)
ECHO LV ESV INDEX A2C: 9 ML/M2
ECHO LV ESV INDEX A4C: 13 ML/M2
ECHO LV ESV INDEX BP: 11 ML/M2
ECHO LV FRACTIONAL SHORTENING: 30 % (ref 28–44)
ECHO LV INTERNAL DIMENSION DIASTOLE INDEX: 1.71 CM/M2
ECHO LV INTERNAL DIMENSION DIASTOLIC: 2.7 CM (ref 3.9–5.3)
ECHO LV INTERNAL DIMENSION SYSTOLIC INDEX: 1.2 CM/M2
ECHO LV INTERNAL DIMENSION SYSTOLIC: 1.9 CM
ECHO LV ISOVOLUMETRIC RELAXATION TIME (IVRT): 99 MS
ECHO LV IVSD: 1.5 CM (ref 0.6–0.9)
ECHO LV MASS 2D: 155.1 G (ref 67–162)
ECHO LV MASS INDEX 2D: 98.2 G/M2 (ref 43–95)
ECHO LV POSTERIOR WALL DIASTOLIC: 1.7 CM (ref 0.6–0.9)
ECHO LV RELATIVE WALL THICKNESS RATIO: 1.26
ECHO LVOT AREA: 3.1 CM2
ECHO LVOT AV VTI INDEX: 0.6
ECHO LVOT DIAM: 2 CM
ECHO LVOT MEAN GRADIENT: 3 MMHG
ECHO LVOT PEAK GRADIENT: 5 MMHG
ECHO LVOT PEAK VELOCITY: 1.1 M/S
ECHO LVOT STROKE VOLUME INDEX: 34.4 ML/M2
ECHO LVOT SV: 54.3 ML
ECHO LVOT VTI: 17.3 CM
ECHO MV A VELOCITY: 0.34 M/S
ECHO MV AREA PHT: 5 CM2
ECHO MV AREA VTI: 2.4 CM2
ECHO MV E DECELERATION TIME (DT): 146.5 MS
ECHO MV E VELOCITY: 1.21 M/S
ECHO MV E/A RATIO: 3.56
ECHO MV LVOT VTI INDEX: 1.31
ECHO MV MAX VELOCITY: 1.3 M/S
ECHO MV MEAN GRADIENT: 2 MMHG
ECHO MV MEAN VELOCITY: 0.7 M/S
ECHO MV PEAK GRADIENT: 6 MMHG
ECHO MV PRESSURE HALF TIME (PHT): 44 MS
ECHO MV VTI: 22.7 CM
ECHO PULMONARY ARTERY END DIASTOLIC PRESSURE: 7 MMHG
ECHO PV MAX VELOCITY: 1 M/S
ECHO PV MEAN GRADIENT: 2 MMHG
ECHO PV MEAN VELOCITY: 0.6 M/S
ECHO PV PEAK GRADIENT: 4 MMHG
ECHO PV REGURGITANT MAX VELOCITY: 1.4 M/S
ECHO PV VTI: 17.4 CM
ECHO RIGHT VENTRICULAR SYSTOLIC PRESSURE (RVSP): 39 MMHG
ECHO RV INTERNAL DIMENSION: 2.9 CM
ECHO RV LONGITUDINAL DIMENSION: 6.9 CM
ECHO RV MID DIMENSION: 1.9 CM
ECHO TV REGURGITANT MAX VELOCITY: 2.99 M/S
ECHO TV REGURGITANT PEAK GRADIENT: 36 MMHG
EKG ATRIAL RATE: 227 BPM
EKG Q-T INTERVAL: 332 MS
EKG QRS DURATION: 62 MS
EKG QTC CALCULATION (BAZETT): 403 MS
EKG R AXIS: -33 DEGREES
EKG T AXIS: 152 DEGREES
EKG VENTRICULAR RATE: 89 BPM
GFR, ESTIMATED: 56 ML/MIN/1.73M2
GLUCOSE SERPL-MCNC: 108 MG/DL (ref 74–99)
HBA1C MFR BLD: 5.9 % (ref 4–5.6)
HDLC SERPL-MCNC: 43 MG/DL
LDLC SERPL CALC-MCNC: 55 MG/DL
MAGNESIUM SERPL-MCNC: 2 MG/DL (ref 1.6–2.6)
POTASSIUM SERPL-SCNC: 4.4 MMOL/L (ref 3.5–5)
SODIUM SERPL-SCNC: 143 MMOL/L (ref 132–146)
TRIGL SERPL-MCNC: 94 MG/DL
VLDLC SERPL CALC-MCNC: 19 MG/DL

## 2024-05-22 PROCEDURE — 93005 ELECTROCARDIOGRAM TRACING: CPT | Performed by: INTERNAL MEDICINE

## 2024-05-22 PROCEDURE — 80061 LIPID PANEL: CPT

## 2024-05-22 PROCEDURE — 2580000003 HC RX 258: Performed by: STUDENT IN AN ORGANIZED HEALTH CARE EDUCATION/TRAINING PROGRAM

## 2024-05-22 PROCEDURE — 6370000000 HC RX 637 (ALT 250 FOR IP)

## 2024-05-22 PROCEDURE — 83735 ASSAY OF MAGNESIUM: CPT

## 2024-05-22 PROCEDURE — 6360000002 HC RX W HCPCS: Performed by: STUDENT IN AN ORGANIZED HEALTH CARE EDUCATION/TRAINING PROGRAM

## 2024-05-22 PROCEDURE — 93306 TTE W/DOPPLER COMPLETE: CPT

## 2024-05-22 PROCEDURE — 6370000000 HC RX 637 (ALT 250 FOR IP): Performed by: STUDENT IN AN ORGANIZED HEALTH CARE EDUCATION/TRAINING PROGRAM

## 2024-05-22 PROCEDURE — 80048 BASIC METABOLIC PNL TOTAL CA: CPT

## 2024-05-22 PROCEDURE — 99233 SBSQ HOSP IP/OBS HIGH 50: CPT | Performed by: STUDENT IN AN ORGANIZED HEALTH CARE EDUCATION/TRAINING PROGRAM

## 2024-05-22 PROCEDURE — 36415 COLL VENOUS BLD VENIPUNCTURE: CPT

## 2024-05-22 PROCEDURE — 97116 GAIT TRAINING THERAPY: CPT | Performed by: PHYSICAL THERAPIST

## 2024-05-22 PROCEDURE — 97161 PT EVAL LOW COMPLEX 20 MIN: CPT | Performed by: PHYSICAL THERAPIST

## 2024-05-22 PROCEDURE — 83036 HEMOGLOBIN GLYCOSYLATED A1C: CPT

## 2024-05-22 PROCEDURE — 6370000000 HC RX 637 (ALT 250 FOR IP): Performed by: INTERNAL MEDICINE

## 2024-05-22 PROCEDURE — 2060000000 HC ICU INTERMEDIATE R&B

## 2024-05-22 PROCEDURE — 97530 THERAPEUTIC ACTIVITIES: CPT | Performed by: PHYSICAL THERAPIST

## 2024-05-22 RX ORDER — CETIRIZINE HYDROCHLORIDE 10 MG/1
5 TABLET ORAL DAILY
Status: DISCONTINUED | OUTPATIENT
Start: 2024-05-22 | End: 2024-05-25 | Stop reason: HOSPADM

## 2024-05-22 RX ORDER — DIGOXIN 125 MCG
125 TABLET ORAL DAILY
Status: DISCONTINUED | OUTPATIENT
Start: 2024-05-22 | End: 2024-05-23

## 2024-05-22 RX ORDER — AMLODIPINE BESYLATE 5 MG/1
5 TABLET ORAL DAILY
Status: DISCONTINUED | OUTPATIENT
Start: 2024-05-22 | End: 2024-05-22

## 2024-05-22 RX ORDER — FUROSEMIDE 20 MG/1
20 TABLET ORAL DAILY
Status: DISCONTINUED | OUTPATIENT
Start: 2024-05-23 | End: 2024-05-23

## 2024-05-22 RX ORDER — LIDOCAINE 4 G/G
1 PATCH TOPICAL DAILY
Status: DISCONTINUED | OUTPATIENT
Start: 2024-05-22 | End: 2024-05-25 | Stop reason: HOSPADM

## 2024-05-22 RX ADMIN — CARVEDILOL 3.12 MG: 3.12 TABLET, FILM COATED ORAL at 10:44

## 2024-05-22 RX ADMIN — RIVAROXABAN 20 MG: 20 TABLET, FILM COATED ORAL at 10:43

## 2024-05-22 RX ADMIN — FUROSEMIDE 40 MG: 10 INJECTION, SOLUTION INTRAMUSCULAR; INTRAVENOUS at 17:32

## 2024-05-22 RX ADMIN — RIVAROXABAN 10 MG: 20 TABLET, FILM COATED ORAL at 17:36

## 2024-05-22 RX ADMIN — FUROSEMIDE 40 MG: 10 INJECTION, SOLUTION INTRAMUSCULAR; INTRAVENOUS at 11:06

## 2024-05-22 RX ADMIN — Medication 10 ML: at 11:06

## 2024-05-22 RX ADMIN — CARVEDILOL 3.12 MG: 3.12 TABLET, FILM COATED ORAL at 17:30

## 2024-05-22 RX ADMIN — ONDANSETRON 4 MG: 2 INJECTION INTRAMUSCULAR; INTRAVENOUS at 20:03

## 2024-05-22 RX ADMIN — VALSARTAN 40 MG: 40 TABLET, FILM COATED ORAL at 10:44

## 2024-05-22 RX ADMIN — HYDRALAZINE HYDROCHLORIDE 20 MG: 20 INJECTION INTRAMUSCULAR; INTRAVENOUS at 18:00

## 2024-05-22 RX ADMIN — DIGOXIN 125 MCG: 125 TABLET ORAL at 10:44

## 2024-05-22 RX ADMIN — AMLODIPINE BESYLATE 5 MG: 5 TABLET ORAL at 10:44

## 2024-05-22 RX ADMIN — Medication 10 ML: at 20:03

## 2024-05-22 RX ADMIN — CETIRIZINE HYDROCHLORIDE 5 MG: 10 TABLET, FILM COATED ORAL at 10:44

## 2024-05-22 ASSESSMENT — PAIN SCALES - GENERAL: PAINLEVEL_OUTOF10: 4
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
## 2024-05-22 NOTE — PROGRESS NOTES
room air.  She has been compliant with home medications or outpatient follow-ups.  She denies chest pain, nausea, vomiting, diarrhea, shortness, cough, headache, numbness, or tingling.     Review of Systems:     Constitutional:  negative for chills, fevers, sweats  Respiratory:  negative for cough, dyspnea on exertion, shortness of breath, wheezing  Cardiovascular:  negative for chest pain, chest pressure/discomfort, lower extremity edema, palpitations  Gastrointestinal:  negative for abdominal pain, constipation, diarrhea, nausea, vomiting  Neurological:  negative for dizziness, headache    Medications:     Allergies:    Allergies   Allergen Reactions    Iodine      All reactions per patient     Doxycycline      Unknown reaction per patient     Other      Nuclear injection for stress test      Legs weak  Nauseated  Pale stomach pain     Cefoxitin Sodium In Dextrose Nausea And Vomiting     mefoxin    Hycomine Compound [Pe-Cpm-Hydrocod-Apap-Caff] Nausea And Vomiting    Iodine Nausea And Vomiting and Anxiety    Mefoxin [Cefoxitin] Nausea And Vomiting       Current Meds:   Scheduled Meds:    amLODIPine  5 mg Oral Daily    digoxin  125 mcg Oral Daily    cetirizine  5 mg Oral Daily    rivaroxaban  10 mg Oral Daily    sodium chloride flush  5-40 mL IntraVENous 2 times per day    furosemide  40 mg IntraVENous BID    rivaroxaban  20 mg Oral Daily    carvedilol  3.125 mg Oral BID WC    valsartan  40 mg Oral Daily     Continuous Infusions:    sodium chloride       PRN Meds: sodium chloride flush, sodium chloride, ondansetron **OR** ondansetron, polyethylene glycol, acetaminophen **OR** acetaminophen, potassium chloride **OR** potassium alternative oral replacement **OR** potassium chloride, magnesium sulfate, hydrALAZINE, acetaminophen    Data:     Past Medical History:   has a past medical history of Anxiety, Cancer (HCC), DJD (degenerative joint disease), History of Holter monitoring, Hyperlipidemia, Hypertension, Irregular  petechiae rash   Skin:  no gross lesions, rashes, induration    Assessment:        Hospital Problems             Last Modified POA    * (Principal) Acute decompensated heart failure (HCC) 5/21/2024 Yes    Acute systolic CHF (congestive heart failure) ([facility]) 5/21/2024 Yes       Plan:        Acute on chronic CHF-elevated proBNP.  Trace bilateral pleural effusions.  Continue Lasix twice daily.  Strict I's and O's and daily weights.  Continue patient's p.o. meds.  Cardiac consult.  K-api-ogadijfy digoxin and Xarelto.  Cardiology consult.  Monitor on telemetry  Hypertension urgency-hydralazine as needed.  Continue home medications.  Correct less than 20 %/day.  Head CT negative   Left shoulder pain- ortho consult     Amy Solano MD  5/22/2024  2:47 PM

## 2024-05-22 NOTE — ED NOTES
Report to floor attempted. Floor did not answer. Will re-attempt in 15 minutes then send patient to floor with bedside report.
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
## 2024-05-22 NOTE — CARE COORDINATION
Case Management Assessment  Initial Evaluation    Date/Time of Evaluation: 5/22/2024 4:47 PM  Assessment Completed by: Layne Doyle RN    If patient is discharged prior to next notation, then this note serves as note for discharge by case management.    Patient Name: Caitlyn Mitchell                   YOB: 1940  Diagnosis: Shortness of breath [R06.02]  Hypertensive urgency [I16.0]  Primary osteoarthritis of left shoulder [M19.012]  Acute systolic CHF (congestive heart failure) (HCC) [I50.21]  Acute decompensated heart failure (HCC) [I50.9]  New onset of congestive heart failure (HCC) [I50.9]                   Date / Time: 5/21/2024 11:37 AM    Patient Admission Status: Inpatient   Readmission Risk (Low < 19, Mod (19-27), High > 27): Readmission Risk Score: 11.6    Current PCP: Miguel Coleman MD  PCP verified by CM? Yes (Miguel Coleman)    Chart Reviewed: Yes      History Provided by: Patient  Patient Orientation: Alert and Oriented    Patient Cognition: Alert    Hospitalization in the last 30 days (Readmission):  No    If yes, Readmission Assessment in CM Navigator will be completed.    Advance Directives:      Code Status: Full Code   Patient's Primary Decision Maker is: Patient Declined (Legal Next of Kin Remains as Decision Maker)      Discharge Planning:    Patient lives with: Alone Type of Home: House  Primary Care Giver: Self  Patient Support Systems include: Family Members   Current Financial resources:    Current community resources:    Current services prior to admission: None            Current DME:              Type of Home Care services:  None    ADLS  Prior functional level: Independent in ADLs/IADLs  Current functional level: Independent in ADLs/IADLs    PT AM-PAC: 17 /24  OT AM-PAC:   /24    Family can provide assistance at DC: Other (comment) (unclear how much assistance her family can provide, pt states she private pays when she needs assistance)  Would you like Case Management to  discuss the discharge plan with any other family members/significant others, and if so, who? Yes (pts sister-in-law Linnea and niece Silva Sage)  Plans to Return to Present Housing: Yes  Other Identified Issues/Barriers to RETURNING to current housing: none  Potential Assistance needed at discharge: N/A            Potential DME:    Patient expects to discharge to: House  Plan for transportation at discharge:      Financial    Payor: MEDICARE / Plan: MEDICARE PART A AND B / Product Type: *No Product type* /       Potential assistance Purchasing Medications:    Meds-to-Beds request:        [id number] - [place], [place] - [address] - P [phone] - F [phone] [address]  [place] [address]  Phone: [phone] Fax: [phone]    [id number] - [place], [place] - [address] - P [phone] - F [phone]  [address]  [place] [address]  Phone: [phone] Fax: [phone]      Notes:    Factors facilitating achievement of predicted outcomes: Cooperative and Pleasant    Barriers to discharge: Limited family support and Medical complications    Additional Case Management Notes: 5/22/24 1141 CM note: no covid testing. IV lasix. Room air. Hx uterine cancer, afib-on xarelto. Met with patient at the bedside to discuss transition of care at discharge. Pt lives alone in a 2 floor home plus attic and basement. She is independent with ADLs, drives short distances but also private pays for transportation for longer distances. She has DME available to include a cane, ww, BSC, shower chair, and wc but states she doesn't currently use it. Her PCP is Miguel Coleman and she states she follows with Dr Charley jasmine/ nephrology. Her pharmacy is [facility] on [address] in [place]. No hx HHC or SNF but she is currently going to outpt therapy at [facility] in [place]. Discharge plan is home with resumption of outpt therapy. Pt evidently decontaminated for bed bugs in

## 2024-05-22 NOTE — PLAN OF CARE
Patient's chart updated to reflect:        - HF care plan, HF education points and HF discharge instructions.  -Orders: 2 gram sodium diet, daily weights, I/O.  -PCP and cardiology follow up appointments to be scheduled within 7 days of hospital discharge.  -CHF education session will be provided to the patient prior to hospital discharge.    Karla Telles, RN MSN,RN  Heart Failure Navigator

## 2024-05-22 NOTE — ED NOTES
Report given to floor. Floor expresses concern of patient condition and the need for a single/double room. Supervisor advices that patient can go to floor.

## 2024-05-22 NOTE — PLAN OF CARE
Problem: Discharge Planning  Goal: Discharge to home or other facility with appropriate resources  Outcome: Progressing  Flowsheets (Taken 5/22/2024 1038)  Discharge to home or other facility with appropriate resources: Identify barriers to discharge with patient and caregiver     Problem: Safety - Adult  Goal: Free from fall injury  Outcome: Progressing     Problem: ABCDS Injury Assessment  Goal: Absence of physical injury  Outcome: Progressing  Flowsheets (Taken 5/22/2024 1038)  Absence of Physical Injury: Implement safety measures based on patient assessment     Problem: Cardiovascular - Adult  Goal: Maintains optimal cardiac output and hemodynamic stability  Outcome: Progressing  Goal: Absence of cardiac dysrhythmias or at baseline  Outcome: Progressing     Problem: Metabolic/Fluid and Electrolytes - Adult  Goal: Electrolytes maintained within normal limits  Outcome: Progressing  Goal: Hemodynamic stability and optimal renal function maintained  Outcome: Progressing     Problem: Chronic Conditions and Co-morbidities  Goal: Patient's chronic conditions and co-morbidity symptoms are monitored and maintained or improved  Outcome: Progressing

## 2024-05-22 NOTE — CONSULTS
Department of Orthopedic Surgery  Consult Note    Reason for Consult: Left shoulder pain    HISTORY OF PRESENT ILLNESS:       Patient is a right-hand-dominant 84 y.o. female who presents with a longstanding history of pain in the left shoulder.  Patient was evaluated by PCP, and referred to therapy which has been helping with her pain.  Denies any trauma or any event preceding the shoulder pain.  She reports bilateral shoulder pains for at least the last few years.  Patient stated that about a day or 2 ago, she started experiencing progressive pain to the left shoulder, worse than she ever experienced before.  She was evaluated in the emergency department and started admitted to the floor with a diagnosis of acute on chronic CHF.  Her medical history is also significant for A-fib for which she is on Xarelto for anticoagulation.   Orthopedic history significant for bilateral total hip arthroplasties which have been unremarkable to date.  Denies any other orthopedic complaints at this time.      Past Medical History:        Diagnosis Date    Anxiety     no medications, becomes short of breath when upset/ anxious    Cancer (HCC) 2013    uterine    DJD (degenerative joint disease) 06/02/2014    Rt. RICHARD 6/2/2014 ZANE Clayton MD    History of Holter monitoring 09/02/2022    Hyperlipidemia     no meds    Hypertension     Irregular heart beat     1 episode while hospitalized for kidney stone    Osteoarthritis 07/18/2016    Lt. RICHARD 7/18/2016 ZANE Clayton MD    Preoperative clearance     Dr Petersen, medical for right hip arthroplasty 6/14    Renal calculi     for or 8/5/2015    Ureteral perforation secondary to stent manipulation 05/08/2015     Past Surgical History:        Procedure Laterality Date    CHOLECYSTECTOMY N/A     HYSTERECTOMY (CERVIX STATUS UNKNOWN) N/A     LITHOTRIPSY Left 08/05/2015    LEFT RENAL CALCULI    OTHER SURGICAL HISTORY  05/08/2015    Cystoscopy retrograde pyelogram    SKIN BIOPSY      skin cancer nose

## 2024-05-22 NOTE — CONSULTS
Nutrition Education:  Educated on CHF/Hearty healthy diet tip, salt free seasoning   Learners: Patient  Readiness: Acceptance  Method: Explanation and Handout  Response: Verbalizes Understanding  Contact name and number provided.     Deaflakito Del Cid  [phone]

## 2024-05-22 NOTE — PROGRESS NOTES
SPIRITUAL HEALTH SERVICES - [facility]ANAY [facility] Encounter    Name: Caitlyn Mitchell                  Referral: Routine Visit    Sacraments  Anointed (Last Rites): Yes  Apostolic [place]: No  Confession: No  Communion: Yes     Assessment:  Patient receptive to  visit.      Intervention:   provided spiritual support and sacramental ministry for patient.     Outcome:  Patient expressed gratitude for visit.    Plan:  Chaplains will remain available to offer spiritual and emotional support as needed.      Electronically signed by Chaplain Denise, on 5/22/2024 at 4:19 PM.  Spiritual Care Department  [facility]  [phone]

## 2024-05-23 ENCOUNTER — APPOINTMENT (OUTPATIENT)
Dept: GENERAL RADIOLOGY | Age: 84
End: 2024-05-23
Payer: MEDICARE

## 2024-05-23 LAB
ANION GAP SERPL CALCULATED.3IONS-SCNC: 13 MMOL/L (ref 7–16)
BUN SERPL-MCNC: 30 MG/DL (ref 6–23)
CALCIUM SERPL-MCNC: 9.2 MG/DL (ref 8.6–10.2)
CHLORIDE SERPL-SCNC: 106 MMOL/L (ref 98–107)
CO2 SERPL-SCNC: 22 MMOL/L (ref 22–29)
CREAT SERPL-MCNC: 1.2 MG/DL (ref 0.5–1)
EKG ATRIAL RATE: 144 BPM
EKG Q-T INTERVAL: 310 MS
EKG QRS DURATION: 66 MS
EKG QTC CALCULATION (BAZETT): 383 MS
EKG R AXIS: -32 DEGREES
EKG T AXIS: 154 DEGREES
EKG VENTRICULAR RATE: 92 BPM
GFR, ESTIMATED: 43 ML/MIN/1.73M2
GLUCOSE SERPL-MCNC: 100 MG/DL (ref 74–99)
MAGNESIUM SERPL-MCNC: 1.9 MG/DL (ref 1.6–2.6)
POTASSIUM SERPL-SCNC: 4.3 MMOL/L (ref 3.5–5)
SODIUM SERPL-SCNC: 141 MMOL/L (ref 132–146)
TSH SERPL DL<=0.05 MIU/L-ACNC: 2.61 UIU/ML (ref 0.27–4.2)

## 2024-05-23 PROCEDURE — 2060000000 HC ICU INTERMEDIATE R&B

## 2024-05-23 PROCEDURE — 6370000000 HC RX 637 (ALT 250 FOR IP): Performed by: INTERNAL MEDICINE

## 2024-05-23 PROCEDURE — 6370000000 HC RX 637 (ALT 250 FOR IP): Performed by: STUDENT IN AN ORGANIZED HEALTH CARE EDUCATION/TRAINING PROGRAM

## 2024-05-23 PROCEDURE — 6360000002 HC RX W HCPCS: Performed by: STUDENT IN AN ORGANIZED HEALTH CARE EDUCATION/TRAINING PROGRAM

## 2024-05-23 PROCEDURE — 6370000000 HC RX 637 (ALT 250 FOR IP)

## 2024-05-23 PROCEDURE — 99232 SBSQ HOSP IP/OBS MODERATE 35: CPT | Performed by: STUDENT IN AN ORGANIZED HEALTH CARE EDUCATION/TRAINING PROGRAM

## 2024-05-23 PROCEDURE — 97530 THERAPEUTIC ACTIVITIES: CPT

## 2024-05-23 PROCEDURE — 2580000003 HC RX 258: Performed by: STUDENT IN AN ORGANIZED HEALTH CARE EDUCATION/TRAINING PROGRAM

## 2024-05-23 PROCEDURE — 83735 ASSAY OF MAGNESIUM: CPT

## 2024-05-23 PROCEDURE — 84443 ASSAY THYROID STIM HORMONE: CPT

## 2024-05-23 PROCEDURE — 80048 BASIC METABOLIC PNL TOTAL CA: CPT

## 2024-05-23 PROCEDURE — 97165 OT EVAL LOW COMPLEX 30 MIN: CPT

## 2024-05-23 PROCEDURE — 73502 X-RAY EXAM HIP UNI 2-3 VIEWS: CPT

## 2024-05-23 PROCEDURE — 93010 ELECTROCARDIOGRAM REPORT: CPT | Performed by: INTERNAL MEDICINE

## 2024-05-23 PROCEDURE — 36415 COLL VENOUS BLD VENIPUNCTURE: CPT

## 2024-05-23 RX ORDER — DIGOXIN 125 MCG
125 TABLET ORAL
Status: DISCONTINUED | OUTPATIENT
Start: 2024-05-25 | End: 2024-05-25 | Stop reason: HOSPADM

## 2024-05-23 RX ADMIN — FUROSEMIDE 20 MG: 20 TABLET ORAL at 08:01

## 2024-05-23 RX ADMIN — VALSARTAN 40 MG: 40 TABLET, FILM COATED ORAL at 08:01

## 2024-05-23 RX ADMIN — HYDRALAZINE HYDROCHLORIDE 20 MG: 20 INJECTION INTRAMUSCULAR; INTRAVENOUS at 23:54

## 2024-05-23 RX ADMIN — ONDANSETRON 4 MG: 2 INJECTION INTRAMUSCULAR; INTRAVENOUS at 06:38

## 2024-05-23 RX ADMIN — Medication 10 ML: at 08:03

## 2024-05-23 RX ADMIN — CARVEDILOL 3.12 MG: 3.12 TABLET, FILM COATED ORAL at 17:52

## 2024-05-23 RX ADMIN — CARVEDILOL 3.12 MG: 3.12 TABLET, FILM COATED ORAL at 08:00

## 2024-05-23 RX ADMIN — DIGOXIN 125 MCG: 125 TABLET ORAL at 08:00

## 2024-05-23 RX ADMIN — Medication 10 ML: at 20:02

## 2024-05-23 RX ADMIN — CETIRIZINE HYDROCHLORIDE 5 MG: 10 TABLET, FILM COATED ORAL at 08:01

## 2024-05-23 RX ADMIN — HYDRALAZINE HYDROCHLORIDE 20 MG: 20 INJECTION INTRAMUSCULAR; INTRAVENOUS at 08:05

## 2024-05-23 ASSESSMENT — PAIN SCALES - GENERAL
PAINLEVEL_OUTOF10: 3
PAINLEVEL_OUTOF10: 0
PAINLEVEL_OUTOF10: 3
PAINLEVEL_OUTOF10: 4
PAINLEVEL_OUTOF10: 0
PAINLEVEL_OUTOF10: 0

## 2024-05-23 NOTE — PROGRESS NOTES
Pt gave verbal consent to update Grand Mikey Brantley over the phone. Fercho given update on pt.   Phone number [phone]

## 2024-05-23 NOTE — PROGRESS NOTES
OCCUPATIONAL THERAPY INITIAL EVALUATION    [facility]  [address] [place]. [place]        Date:2024                                                  Patient Name: Caitlyn Mitchell    MRN: [id number]    : 1940    Room: [address] [place] [address]      Evaluating OT: Xavier Morin OTR/L; #[id number]     Referring Provider and Specific Provider Orders/Date:      24 1015  OT eval and treat  Start:  24 1015,   End:  24 1015,   ONE TIME,   Standing Count:  1 Occurrences,   R         Amy Solano MD      Placement Recommendation: Home no occupational therapy       Diagnosis:   1. New onset of congestive heart failure (HCC)    2. Hypertensive urgency    3. Primary osteoarthritis of left shoulder    4. Shortness of breath         Surgery: None      Pertinent Medical History:       Past Medical History:   Diagnosis Date    Anxiety     no medications, becomes short of breath when upset/ anxious    Cancer (HCC)     uterine    DJD (degenerative joint disease) 2014    Rt. RICHARD 2014 ZANE Clayton MD    History of Holter monitoring 2022    Hyperlipidemia     no meds    Hypertension     Irregular heart beat     1 episode while hospitalized for kidney stone    Osteoarthritis 2016    Lt. RICHARD 2016 ZANE Clayton MD    Preoperative clearance     Dr Petersen, medical for right hip arthroplasty     Renal calculi     for or 2015    Ureteral perforation secondary to stent manipulation 2015         Past Surgical History:   Procedure Laterality Date    CHOLECYSTECTOMY N/A     HYSTERECTOMY (CERVIX STATUS UNKNOWN) N/A     LITHOTRIPSY Left 2015    LEFT RENAL CALCULI    OTHER SURGICAL HISTORY  2015    Cystoscopy retrograde pyelogram    SKIN BIOPSY      skin cancer nose    TOTAL HIP ARTHROPLASTY Right 2014    right hip ZANE Clayton MD    TOTAL HIP ARTHROPLASTY Left 2016    left hip ZANE Clayton MD        Precautions:   falls, hard of hearing,      Assessment of current deficits:     [x] Functional mobility  [x]ADLs  [x] Strength               []Cognition    [x] Functional transfers   [x] IADLs         [] Safety Awareness   [x]Endurance    [] Fine Coordination              [x] Balance      [] Vision/perception   []Sensation     []Gross Motor Coordination  [] ROM  [] Delirium                   [] Motor Control     OT PLAN OF CARE   OT POC based on physician orders, patient diagnosis and results of clinical assessment    Frequency/Duration 1-3 days/wk for 2 weeks PRN     Specific OT Treatment Interventions to include:   * Instruction/training on adapted ADL techniques and AE recommendations to increase functional independence within precautions       * Training on energy conservation strategies, correct breathing pattern and techniques to improve independence/tolerance for self-care routine  * Functional transfer/mobility training/DME recommendations for increased independence, safety, and fall prevention  * Therapeutic exercise to improve motor endurance, ROM, and functional strength for ADLs/functional transfers  * Therapeutic activities to facilitate/challenge dynamic balance, stand tolerance for increased safety and independence with ADLs    Recommended Adaptive Equipment: None at this time    Home Living: Patient lives alone in a two story home resides first  with 3 steps, without rail on side, has steps in back and front with rail  to enter home.   Tub shower      Equipment owned: Cane, Wheeled Walker, Bedside commode, and Shower chair,       Prior Level of Function: Independent with ADLs , Independent with IADLs; ambulated without AD    Driving: Yes      Pain Level: 0/10 pain reported      Cognition: A&O: 4/4; Follows 1-2 step directions   Memory: good    Sequencing: good    Problem solving: good    Judgement/safety: good     [facility]       AM-PAC Daily Activity - Inpatient   How much help is needed for putting on and taking off

## 2024-05-23 NOTE — PROGRESS NOTES
Cardiology Progress Note  5/23/2024 7:23 PM    Symptoms: She is feeling somewhat better today.  She still has trouble with that shoulder.  The hip x-ray apparently did not show anything that could be repaired.  She needs to work with some orthopedic doctor I told her and physical therapy.       Scheduled meds    [START ON 5/25/2024] digoxin  125 mcg Oral Q48H    [START ON 5/24/2024] apixaban  2.5 mg Oral BID    cetirizine  5 mg Oral Daily    lidocaine  1 patch TransDERmal Daily    sodium chloride flush  5-40 mL IntraVENous 2 times per day    carvedilol  3.125 mg Oral BID WC    valsartan  40 mg Oral Daily         Objective:  Blood pressure 126/77, pulse (!) 133, temperature 97.8 °F (36.6 °C), temperature source Oral, resp. rate 20, height 1.499 m (4' 11\"), weight 62.9 kg (138 lb 9.6 oz), SpO2 94 %, not currently breastfeeding.   Intake/Output Summary (Last 24 hours) at 5/23/2024 1923  Last data filed at 5/23/2024 0803  Gross per 24 hour   Intake 140 ml   Output --   Net 140 ml      Wt Readings from Last 3 Encounters:   05/23/24 62.9 kg (138 lb 9.6 oz)   09/27/23 62.6 kg (138 lb)   07/18/23 62.6 kg (138 lb)      There is no S3.  The S1 is variable.  The rate is faster than it has been but she is sitting up in bed and has a visitor and she has been talking a lot to her.    BMP:    Lab Results   Component Value Date/Time     05/23/2024 03:47 AM    K 4.3 05/23/2024 03:47 AM    K 4.1 05/28/2021 01:00 AM     05/23/2024 03:47 AM    CO2 22 05/23/2024 03:47 AM    BUN 30 05/23/2024 03:47 AM    CREATININE 1.2 05/23/2024 03:47 AM    CALCIUM 9.2 05/23/2024 03:47 AM    GFRAA >60 08/20/2022 03:35 PM    LABGLOM 43 05/23/2024 03:47 AM    GLUCOSE 100 05/23/2024 03:47 AM       Assessment:   Her heart failure was probably acute diastolic heart failure meaning it was heart failure with preserved ejection fraction.  It was probably due to hypertensive heart disease.  It seems to have corrected well with the medicine and I

## 2024-05-23 NOTE — PROGRESS NOTES
[facility]   IN-PATIENT SERVICE      Progress Note    5/23/2024    12:29 PM    Name:   Caitlyn Mitchell  MRN:     [id number]     Acct:      [id number]   Room:   [facility]/0518-01   Day:  2  Admit Date:  5/21/2024 11:37 AM    PCP:   Miguel Coleman MD  Code Status:  Full Code    Subjective:     C/C:   Chief Complaint   Patient presents with    Joint Pain     Patient reporting joint pain to all extremities. Worse in the left arm.      Interval History Status:      Pt refuses snf or rehab  Cardio managing bp and chf   Now on po lasix     Slight increased in cr today, likely azotemia from diuresis     Bp is labile and needs further management, appreciate cardio assistance       Brief History:     84-year-old female with a past medical history of atrial fibrillation on digoxin and Xarelto presents emergency department for left shoulder pain.  Patient is presented by her daughter and provides most of the history.  Patient states that she does have orthopnea.  She denies any PND.  She is currently on room air.  She has been compliant with home medications or outpatient follow-ups.  She denies chest pain, nausea, vomiting, diarrhea, shortness, cough, headache, numbness, or tingling.     Review of Systems:     Constitutional:  negative for chills, fevers, sweats  Respiratory:  negative for cough, dyspnea on exertion, shortness of breath, wheezing  Cardiovascular:  negative for chest pain, chest pressure/discomfort, lower extremity edema, palpitations  Gastrointestinal:  negative for abdominal pain, constipation, diarrhea, nausea, vomiting  Neurological:  negative for dizziness, headache    Medications:     Allergies:    Allergies   Allergen Reactions    Iodine      All reactions per patient     Doxycycline      Unknown reaction per patient     Other      Nuclear injection for stress test      Legs weak  Nauseated  Pale stomach pain     Cefoxitin Sodium In Dextrose Nausea And Vomiting     mefoxin     alert, cooperative and no distress  Mental Status:  oriented to person, place and time and normal affect  Lungs:  clear to auscultation bilaterally, normal effort  Heart:  regular rate and rhythm, no murmur  Abdomen:  soft, nontender, nondistended, normal bowel sounds, no masses, hepatomegaly, splenomegaly  Extremities: 1-2 + pitting edema b/l with petechiae rash   Skin:  no gross lesions, rashes, induration    Assessment:        Hospital Problems             Last Modified POA    * (Principal) Acute decompensated heart failure (HCC) 5/21/2024 Yes    Acute systolic CHF (congestive heart failure) ([facility]) 5/21/2024 Yes     Plan:        Acute on chronic CHF-elevated proBNP.  Trace bilateral pleural effusions.  Continue po lasix.  Strict I's and O's and daily weights.  Continue patient's p.o. meds.  Cardiac consult.  W-rqm-xoaqypul digoxin and Xarelto.  Cardiology consult.  Monitor on telemetry  Hypertension urgency-hydralazine as needed.  Continue home medications.  Appreciate cardio assistance   Left shoulder pain- ortho consult and recommend sling for comfort and lidocaine patch.     Cardio managing bp and chf   Now on po lasix   Slight increased in cr today, likely azotemia from diuresis   Bp is labile and needs further management, appreciate cardio assistance       Amy Solano MD  5/23/2024  12:29 PM

## 2024-05-23 NOTE — PLAN OF CARE
Problem: Safety - Adult  Goal: Free from fall injury  5/23/2024 0559 by Georgie Stewart, RN  Outcome: Progressing     Problem: ABCDS Injury Assessment  Goal: Absence of physical injury  5/23/2024 0559 by Georgie Stewart, RN  Outcome: Progressing

## 2024-05-23 NOTE — PROGRESS NOTES
Cardiology Progress Note  5/22/2024 8:43 PM    Symptoms: She seemed to be sleeping comfortable when I came in.  She says she is urinating a lot from the IV Lasix.  She said she vomited today.  She might have bent over and that prompted it.  When she bends over, she gets lightheaded.  Her right hip bothers her besides the left shoulder.       Scheduled meds    digoxin  125 mcg Oral Daily    cetirizine  5 mg Oral Daily    lidocaine  1 patch TransDERmal Daily    sodium chloride flush  5-40 mL IntraVENous 2 times per day    furosemide  40 mg IntraVENous BID    rivaroxaban  20 mg Oral Daily    carvedilol  3.125 mg Oral BID WC    valsartan  40 mg Oral Daily         Objective:  Blood pressure (!) 104/58, pulse (!) 110, temperature 97.6 °F (36.4 °C), temperature source Oral, resp. rate 16, height 1.499 m (4' 11\"), weight 63 kg (139 lb), SpO2 95 %, not currently breastfeeding. No intake or output data in the 24 hours ending 05/22/24 2043   Wt Readings from Last 3 Encounters:   05/21/24 63 kg (139 lb)   09/27/23 62.6 kg (138 lb)   07/18/23 62.6 kg (138 lb)      She still has a few rales but her breathing seems good.  There is no loud S3.  She still has the atrial fibrillation which she has had.    CBC:   Lab Results   Component Value Date/Time    WBC 7.7 05/21/2024 02:07 PM    RBC 4.51 05/21/2024 02:07 PM    HGB 14.0 05/21/2024 02:07 PM    HCT 43.6 05/21/2024 02:07 PM    MCV 96.7 05/21/2024 02:07 PM    MCH 31.0 05/21/2024 02:07 PM    MCHC 32.1 05/21/2024 02:07 PM    RDW 15.7 05/21/2024 02:07 PM     05/21/2024 02:07 PM    MPV 10.2 05/21/2024 02:07 PM     BMP:    Lab Results   Component Value Date/Time     05/22/2024 03:02 AM    K 4.4 05/22/2024 03:02 AM    K 4.1 05/28/2021 01:00 AM     05/22/2024 03:02 AM    CO2 22 05/22/2024 03:02 AM    BUN 27 05/22/2024 03:02 AM    CREATININE 1.0 05/22/2024 03:02 AM    CALCIUM 8.8 05/22/2024 03:02 AM    GFRAA >60 08/20/2022 03:35 PM    LABGLOM 56 05/22/2024 03:02 AM

## 2024-05-24 PROBLEM — I50.31 ACUTE DIASTOLIC (CONGESTIVE) HEART FAILURE (HCC): Status: ACTIVE | Noted: 2024-05-24

## 2024-05-24 PROBLEM — I11.9 HYPERTENSIVE HEART DISEASE: Status: ACTIVE | Noted: 2024-05-24

## 2024-05-24 PROBLEM — I48.91 ATRIAL FIBRILLATION (HCC): Status: ACTIVE | Noted: 2024-05-24

## 2024-05-24 LAB
ANION GAP SERPL CALCULATED.3IONS-SCNC: 11 MMOL/L (ref 7–16)
BNP SERPL-MCNC: 3528 PG/ML (ref 0–450)
BUN SERPL-MCNC: 38 MG/DL (ref 6–23)
CALCIUM SERPL-MCNC: 8.8 MG/DL (ref 8.6–10.2)
CHLORIDE SERPL-SCNC: 108 MMOL/L (ref 98–107)
CO2 SERPL-SCNC: 23 MMOL/L (ref 22–29)
CREAT SERPL-MCNC: 1.3 MG/DL (ref 0.5–1)
GFR, ESTIMATED: 40 ML/MIN/1.73M2
GLUCOSE SERPL-MCNC: 105 MG/DL (ref 74–99)
MAGNESIUM SERPL-MCNC: 1.9 MG/DL (ref 1.6–2.6)
POTASSIUM SERPL-SCNC: 4.4 MMOL/L (ref 3.5–5)
SODIUM SERPL-SCNC: 142 MMOL/L (ref 132–146)

## 2024-05-24 PROCEDURE — 97110 THERAPEUTIC EXERCISES: CPT

## 2024-05-24 PROCEDURE — 6370000000 HC RX 637 (ALT 250 FOR IP): Performed by: INTERNAL MEDICINE

## 2024-05-24 PROCEDURE — 2060000000 HC ICU INTERMEDIATE R&B

## 2024-05-24 PROCEDURE — 97530 THERAPEUTIC ACTIVITIES: CPT

## 2024-05-24 PROCEDURE — 6370000000 HC RX 637 (ALT 250 FOR IP)

## 2024-05-24 PROCEDURE — 83880 ASSAY OF NATRIURETIC PEPTIDE: CPT

## 2024-05-24 PROCEDURE — 99232 SBSQ HOSP IP/OBS MODERATE 35: CPT | Performed by: INTERNAL MEDICINE

## 2024-05-24 PROCEDURE — 80048 BASIC METABOLIC PNL TOTAL CA: CPT

## 2024-05-24 PROCEDURE — 97116 GAIT TRAINING THERAPY: CPT

## 2024-05-24 PROCEDURE — 83735 ASSAY OF MAGNESIUM: CPT

## 2024-05-24 PROCEDURE — 97535 SELF CARE MNGMENT TRAINING: CPT

## 2024-05-24 PROCEDURE — 6370000000 HC RX 637 (ALT 250 FOR IP): Performed by: STUDENT IN AN ORGANIZED HEALTH CARE EDUCATION/TRAINING PROGRAM

## 2024-05-24 PROCEDURE — 2580000003 HC RX 258: Performed by: STUDENT IN AN ORGANIZED HEALTH CARE EDUCATION/TRAINING PROGRAM

## 2024-05-24 RX ORDER — METOPROLOL SUCCINATE 25 MG/1
25 TABLET, EXTENDED RELEASE ORAL DAILY
Status: DISCONTINUED | OUTPATIENT
Start: 2024-05-24 | End: 2024-05-25 | Stop reason: HOSPADM

## 2024-05-24 RX ADMIN — RIVAROXABAN 10 MG: 10 TABLET, FILM COATED ORAL at 19:00

## 2024-05-24 RX ADMIN — Medication 10 ML: at 20:22

## 2024-05-24 RX ADMIN — Medication 10 ML: at 09:34

## 2024-05-24 RX ADMIN — CETIRIZINE HYDROCHLORIDE 5 MG: 10 TABLET, FILM COATED ORAL at 09:30

## 2024-05-24 RX ADMIN — ONDANSETRON 4 MG: 4 TABLET, ORALLY DISINTEGRATING ORAL at 09:30

## 2024-05-24 RX ADMIN — ACETAMINOPHEN 650 MG: 325 TABLET ORAL at 20:24

## 2024-05-24 RX ADMIN — VALSARTAN 40 MG: 40 TABLET, FILM COATED ORAL at 09:33

## 2024-05-24 RX ADMIN — METOPROLOL SUCCINATE 25 MG: 25 TABLET, EXTENDED RELEASE ORAL at 09:30

## 2024-05-24 ASSESSMENT — PAIN SCALES - GENERAL
PAINLEVEL_OUTOF10: 7
PAINLEVEL_OUTOF10: 7
PAINLEVEL_OUTOF10: 4
PAINLEVEL_OUTOF10: 4

## 2024-05-24 ASSESSMENT — PAIN DESCRIPTION - ORIENTATION
ORIENTATION: LEFT
ORIENTATION: LEFT;ANTERIOR;UPPER

## 2024-05-24 ASSESSMENT — PAIN DESCRIPTION - LOCATION
LOCATION: SHOULDER
LOCATION: SHOULDER
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
## 2024-05-24 NOTE — PROGRESS NOTES
OCCUPATIONAL THERAPY TREATMENT NOTE  [facility]  [address] [place]. [place]        Date:2024                                                  Patient Name: Caitlyn Mitchell    MRN: [id number]    : 1940    Room: [address] [place] [address]      Evaluating OT: Xavier Morin OTR/L; #[id number]     Referring Provider and Specific Provider Orders/Date:      24 1015  OT eval and treat  Start:  24 1015,   End:  24 1015,   ONE TIME,   Standing Count:  1 Occurrences,   R         Amy Solano MD      Placement Recommendation: Home with occupational therapy vs subacute rehab if patient does not meet goals.       Diagnosis:   1. New onset of congestive heart failure (HCC)    2. Hypertensive urgency    3. Primary osteoarthritis of left shoulder    4. Shortness of breath         Surgery: None      Pertinent Medical History:       Past Medical History:   Diagnosis Date    Anxiety     no medications, becomes short of breath when upset/ anxious    Cancer (HCC)     uterine    DJD (degenerative joint disease) 2014    Rt. RICHARD 2014 ZANE Clayton MD    History of Holter monitoring 2022    Hyperlipidemia     no meds    Hypertension     Irregular heart beat     1 episode while hospitalized for kidney stone    Osteoarthritis 2016    Lt. RICHARD 2016 ZANE Clayton MD    Preoperative clearance     Dr Petersen, medical for right hip arthroplasty     Renal calculi     for or 2015    Ureteral perforation secondary to stent manipulation 2015         Past Surgical History:   Procedure Laterality Date    CHOLECYSTECTOMY N/A     HYSTERECTOMY (CERVIX STATUS UNKNOWN) N/A     LITHOTRIPSY Left 2015    LEFT RENAL CALCULI    OTHER SURGICAL HISTORY  2015    Cystoscopy retrograde pyelogram    SKIN BIOPSY      skin cancer nose    TOTAL HIP ARTHROPLASTY Right 2014    right hip ZANE Clayton MD    TOTAL HIP ARTHROPLASTY Left 2016    left  history and prior level of function, interpretation of standardized testing/informal observation of tasks, assessment of data and development of plan of care and goals.        Xavier Morin OTR/L; #[id number]

## 2024-05-24 NOTE — PROGRESS NOTES
[facility] Hospitalist   Progress Note    Admitting Date and Time: 5/21/2024 11:37 AM  Admit Dx: Shortness of breath [R06.02]  Hypertensive urgency [I16.0]  Primary osteoarthritis of left shoulder [M19.012]  Acute systolic CHF (congestive heart failure) (HCC) [I50.21]  Acute decompensated heart failure (HCC) [I50.9]  New onset of congestive heart failure (HCC) [I50.9]    Subjective:    Patient was admitted with Shortness of breath [R06.02]  Hypertensive urgency [I16.0]  Primary osteoarthritis of left shoulder [M19.012]  Acute systolic CHF (congestive heart failure) (HCC) [I50.21]  Acute decompensated heart failure (HCC) [I50.9]  New onset of congestive heart failure (HCC) [I50.9]. Patient denies fever, chills, cp, sob, n/v.     metoprolol succinate  25 mg Oral Daily    rivaroxaban  10 mg Oral Daily    [START ON 5/25/2024] digoxin  125 mcg Oral Q48H    cetirizine  5 mg Oral Daily    lidocaine  1 patch TransDERmal Daily    sodium chloride flush  5-40 mL IntraVENous 2 times per day    valsartan  40 mg Oral Daily     sodium chloride flush, 5-40 mL, PRN  sodium chloride, , PRN  ondansetron, 4 mg, Q8H PRN   Or  ondansetron, 4 mg, Q6H PRN  polyethylene glycol, 17 g, Daily PRN  acetaminophen, 650 mg, Q6H PRN   Or  acetaminophen, 650 mg, Q6H PRN  potassium chloride, 40 mEq, PRN   Or  potassium alternative oral replacement, 40 mEq, PRN   Or  potassium chloride, 10 mEq, PRN  magnesium sulfate, 2,000 mg, PRN  hydrALAZINE, 20 mg, Q6H PRN  acetaminophen, 650 mg, Q4H PRN         Objective:    BP (!) 106/57   Pulse (!) 112   Temp 98.2 °F (36.8 °C) (Oral)   Resp 20   Ht 1.499 m (4' 11\")   Wt 62.9 kg (138 lb 9.6 oz)   LMP  (LMP Unknown)   SpO2 97%   BMI 27.99 kg/m²   Skin: warm and dry, no rash or erythema  Pulmonary/Chest: clear to auscultation bilaterally- no wheezes, rales or rhonchi, normal air movement, no respiratory distress  Cardiovascular: rhythm reg at rate of 110  Abdomen: soft, non-tender,

## 2024-05-24 NOTE — CARE COORDINATION
5/24/24 1406 CM note: no covid testing. Room air. Hx uterine cancer, afib-on xarelto. Pt now requesting SNF at [facility]. Referral given to Leyla, [facility] liaison, and they can accept. NO precert needed. WILL NEED SIGNED ZACK AND DC ORDER. Hens COMPLETED. Possible dc tomorrow if stable- Leyla aware. Spoke with patient and pts sister regarding transport to SNF. Family doesn't feel safe transporting her, so pt will need wc transport arranged. Pt is aware that her insurance may not cover the transport and she may be mailed a bill. CM/SW will follow. Electronically signed by Layne Doyle RN on 5/24/2024 at 6:36 PM

## 2024-05-24 NOTE — PROGRESS NOTES
way for a while but it definitely is improved.  I like the valsartan over amlodipine for her blood pressure and heart failure but this is going to affect the kidneys and that is why I cut down on the Xarelto.  I like the Eliquis better for her because of the possibility of the creatinine elevation but she will not be able to remember to take pills twice a day.  Even though she is an intelligent woman, she has some dementia and she is strong minded.  A good friend who must be the sister-in-law helps out but she is older also and she is getting to the point that it is difficult for her to help the patient out.  The niece is 81 years old and travels to other countries but seems interested in the patient and will be here in a week's time.  In the meantime, this patient needs help and I think home health will not be good enough for a person that cannot even lift up her shoulder on the left at 84 years old.  Additionally, I am told that she received 30 mg of Xarelto 1 day here at the hospital.  That is why I am trying to play it safe with just a very low-dose especially with the creatinine possibly going up higher on the valsartan.  Ideally, she belongs in assisted living.  In the meantime, I think a rehab facility for a week is clearly indicated.    Plan: We are not going to get perfect blood pressure control in this woman whose blood pressure is very labile and her pulse is also very labile.  Therefore, the goal is reasonable numbers most of the time not perfect control.  The reason for that is we want the heart failure under control as the main goal.  The next important goal is physical therapy that can help her.  The nurse even asked her to lift her arm on the left and it could not be done.  I do not think an 84-year-old woman who is intelligent but obviously has a little bit of dementia is going to be able to manage her situation at home with the use of 1 arm and difficulty with her hips at home that requires  housework and no one else to help her.  I will continue the present middle-of-the-road approach on the medications without any cardiac intervention such as catheterization or pacemaker yet.  Keep in mind, when she is gone from here the only doctor she is able to reach is me who is her cardiologist.  I believe the niece who is coming here next week may discuss power of  with her.        Completed By:  Rad Feliciano MD, MD, FACC  5/24/2024  12:15 PM

## 2024-05-25 VITALS
OXYGEN SATURATION: 91 % | HEIGHT: 59 IN | DIASTOLIC BLOOD PRESSURE: 80 MMHG | TEMPERATURE: 98.4 F | SYSTOLIC BLOOD PRESSURE: 135 MMHG | BODY MASS INDEX: 27.94 KG/M2 | RESPIRATION RATE: 27 BRPM | HEART RATE: 119 BPM | WEIGHT: 138.6 LBS

## 2024-05-25 LAB
ANION GAP SERPL CALCULATED.3IONS-SCNC: 8 MMOL/L (ref 7–16)
BUN SERPL-MCNC: 33 MG/DL (ref 6–23)
CALCIUM SERPL-MCNC: 8.8 MG/DL (ref 8.6–10.2)
CHLORIDE SERPL-SCNC: 105 MMOL/L (ref 98–107)
CO2 SERPL-SCNC: 26 MMOL/L (ref 22–29)
CREAT SERPL-MCNC: 1.1 MG/DL (ref 0.5–1)
GFR, ESTIMATED: 49 ML/MIN/1.73M2
GLUCOSE SERPL-MCNC: 129 MG/DL (ref 74–99)
MAGNESIUM SERPL-MCNC: 2 MG/DL (ref 1.6–2.6)
POTASSIUM SERPL-SCNC: 4.5 MMOL/L (ref 3.5–5)
SODIUM SERPL-SCNC: 139 MMOL/L (ref 132–146)

## 2024-05-25 PROCEDURE — 6370000000 HC RX 637 (ALT 250 FOR IP): Performed by: STUDENT IN AN ORGANIZED HEALTH CARE EDUCATION/TRAINING PROGRAM

## 2024-05-25 PROCEDURE — 6370000000 HC RX 637 (ALT 250 FOR IP)

## 2024-05-25 PROCEDURE — 99239 HOSP IP/OBS DSCHRG MGMT >30: CPT | Performed by: INTERNAL MEDICINE

## 2024-05-25 PROCEDURE — 83735 ASSAY OF MAGNESIUM: CPT

## 2024-05-25 PROCEDURE — 6370000000 HC RX 637 (ALT 250 FOR IP): Performed by: INTERNAL MEDICINE

## 2024-05-25 PROCEDURE — 36415 COLL VENOUS BLD VENIPUNCTURE: CPT

## 2024-05-25 PROCEDURE — 80048 BASIC METABOLIC PNL TOTAL CA: CPT

## 2024-05-25 RX ORDER — DIGOXIN 125 MCG
125 TABLET ORAL
Qty: 30 TABLET | Refills: 3 | DISCHARGE
Start: 2024-05-27

## 2024-05-25 RX ORDER — VALSARTAN 40 MG/1
40 TABLET ORAL DAILY
Qty: 30 TABLET | Refills: 3 | DISCHARGE
Start: 2024-05-26

## 2024-05-25 RX ORDER — METOPROLOL SUCCINATE 25 MG/1
25 TABLET, EXTENDED RELEASE ORAL DAILY
Qty: 30 TABLET | Refills: 3 | DISCHARGE
Start: 2024-05-26

## 2024-05-25 RX ORDER — LIDOCAINE 4 G/G
1 PATCH TOPICAL DAILY
DISCHARGE
Start: 2024-05-26

## 2024-05-25 RX ORDER — CETIRIZINE HYDROCHLORIDE 5 MG/1
5 TABLET ORAL DAILY
DISCHARGE
Start: 2024-05-26

## 2024-05-25 RX ADMIN — CETIRIZINE HYDROCHLORIDE 5 MG: 10 TABLET, FILM COATED ORAL at 10:10

## 2024-05-25 RX ADMIN — RIVAROXABAN 10 MG: 10 TABLET, FILM COATED ORAL at 17:42

## 2024-05-25 RX ADMIN — DIGOXIN 125 MCG: 125 TABLET ORAL at 09:00

## 2024-05-25 RX ADMIN — VALSARTAN 40 MG: 40 TABLET, FILM COATED ORAL at 10:10

## 2024-05-25 RX ADMIN — METOPROLOL SUCCINATE 25 MG: 25 TABLET, EXTENDED RELEASE ORAL at 10:10

## 2024-05-25 ASSESSMENT — PAIN SCALES - GENERAL: PAINLEVEL_OUTOF10: 4

## 2024-05-25 NOTE — PROGRESS NOTES
Cardiology Progress Note  5/25/2024 4:56 PM    Symptoms: She is ready to go to rehab in [place].  I checked with her to tell her that I will call in her prescriptions when she leaves [place].  She has to let me know       Scheduled meds    metoprolol succinate  25 mg Oral Daily    rivaroxaban  10 mg Oral Daily    digoxin  125 mcg Oral Q48H    cetirizine  5 mg Oral Daily    lidocaine  1 patch TransDERmal Daily    sodium chloride flush  5-40 mL IntraVENous 2 times per day    valsartan  40 mg Oral Daily           Objective:  Blood pressure 135/80, pulse (!) 119, temperature 98.4 °F (36.9 °C), temperature source Oral, resp. rate 27, height 1.499 m (4' 11\"), weight 62.9 kg (138 lb 9.6 oz), SpO2 91 %, not currently breastfeeding. No intake or output data in the 24 hours ending 05/25/24 1656   Wt Readings from Last 3 Encounters:   05/23/24 62.9 kg (138 lb 9.6 oz)   09/27/23 62.6 kg (138 lb)   07/18/23 62.6 kg (138 lb)     No rales.  No respiratory distress.    BMP:    Lab Results   Component Value Date/Time     05/25/2024 02:32 PM    K 4.5 05/25/2024 02:32 PM    K 4.1 05/28/2021 01:00 AM     05/25/2024 02:32 PM    CO2 26 05/25/2024 02:32 PM    BUN 33 05/25/2024 02:32 PM    CREATININE 1.1 05/25/2024 02:32 PM    CALCIUM 8.8 05/25/2024 02:32 PM    GFRAA >60 08/20/2022 03:35 PM    LABGLOM 49 05/25/2024 02:32 PM    GLUCOSE 129 05/25/2024 02:32 PM       Assessment:   Heart working well enough.  I stopped by to make an adjustment in the Xarelto.  I called the nurse at [place] and spoke to her twice.  The patient will get 10 mg of Xarelto today but 15 mg daily of Xarelto starting tomorrow.  The creatinine has improved.  I do like using the Eliquis but the patient when she gets home we will not take it twice a day and I think we have to get her used to the new program.    Plan: She is going to be seen at [place].  They are ready for her.  The nurse on the [facility] floor today will use 10 mg of the Xarelto once.

## 2024-05-25 NOTE — CARE COORDINATION
5/25/224  NOTE: SW notified via nursing that pt will dc on this date. SW notified liaison Leyla @ [facility]. [facility] arranged for 3pm p/up time. Pt an nursing notified of p/up time.     Electronically signed by DELIA Brownlee on 5/25/2024 at 10:03 AM

## 2024-05-25 NOTE — PLAN OF CARE
Problem: Discharge Planning  Goal: Discharge to home or other facility with appropriate resources  Outcome: Completed  Flowsheets (Taken 5/25/2024 0723)  Discharge to home or other facility with appropriate resources:   Identify barriers to discharge with patient and caregiver   Arrange for needed discharge resources and transportation as appropriate     Problem: Safety - Adult  Goal: Free from fall injury  Outcome: Completed     Problem: ABCDS Injury Assessment  Goal: Absence of physical injury  Outcome: Completed     Problem: Cardiovascular - Adult  Goal: Maintains optimal cardiac output and hemodynamic stability  Outcome: Completed  Goal: Absence of cardiac dysrhythmias or at baseline  Outcome: Completed     Problem: Metabolic/Fluid and Electrolytes - Adult  Goal: Electrolytes maintained within normal limits  Outcome: Completed  Flowsheets (Taken 5/25/2024 0723)  Electrolytes maintained within normal limits:   Monitor labs and assess patient for signs and symptoms of electrolyte imbalances   Administer electrolyte replacement as ordered   Fluid restriction as ordered   Monitor response to electrolyte replacements, including repeat lab results as appropriate   Instruct patient on fluid and nutrition restrictions as appropriate  Goal: Hemodynamic stability and optimal renal function maintained  Outcome: Completed  Flowsheets (Taken 5/25/2024 0723)  Hemodynamic stability and optimal renal function maintained:   Monitor labs and assess for signs and symptoms of volume excess or deficit   Monitor intake, output and patient weight     Problem: Chronic Conditions and Co-morbidities  Goal: Patient's chronic conditions and co-morbidity symptoms are monitored and maintained or improved  Outcome: Completed  Flowsheets (Taken 5/25/2024 0723)  Care Plan - Patient's Chronic Conditions and Co-Morbidity Symptoms are Monitored and Maintained or Improved:   Monitor and assess patient's chronic conditions and comorbid symptoms

## 2024-05-25 NOTE — CARE COORDINATION
5/25/2024 302p (sf) SS NOTE: SW notified by nursing that transport needs to be pushed back due to waiting on lab work. SW contacted [facility]. New  time is 6pm. Nurse Ernestine notified and she will update pt. Message was left for [facility] liaison Marilou with new p/up time.     Electronically signed by DELIA Brownlee on 5/25/2024 at 3:06 PM

## 2024-05-25 NOTE — DISCHARGE SUMMARY
[facility] Hospitalist       Hospitalist Physician Discharge Summary       Miguel Coleman MD  [address]  [place] [address]  [phone]    Call today  to schedule hospital follow up appointment, to be seen within 5- 7 days    Rad Feliciano MD  [address], [address]  [facility] [address]  [phone]    Follow up  to schedule hospital follow up appointment, to be seen within 5- 7 days, Bring all pill bottles to appointment      Activity level: as erika    Diet: ADULT DIET; Regular; Low Sodium (2 gm)    Dispo:snf    Condition at discharge: fair      Patient ID:  Caitlyn Mitchell  [id number]  84 y.o.  1940    Admit date: 5/21/2024    Discharge date and time:  5/25/2024  10:36 AM    Admission Diagnoses: Principal Problem:    Acute decompensated heart failure (HCC)  Active Problems:    Acute systolic CHF (congestive heart failure) (HCC)    Hypertensive heart disease    Atrial fibrillation (HCC)    Acute diastolic (congestive) heart failure (HCC)  Resolved Problems:    * No resolved hospital problems. *      Discharge Diagnoses: Principal Problem:    Acute decompensated heart failure (HCC)  Active Problems:    Acute systolic CHF (congestive heart failure) (HCC)    Hypertensive heart disease    Atrial fibrillation (HCC)    Acute diastolic (congestive) heart failure (HCC)  Resolved Problems:    * No resolved hospital problems. *    Acute diastolic chf  Hypertensive heart disease  Htn  Atrial fib  Left shoulder pain with limited ROM of shoulder with possible frozen shoulder      Consults:  IP CONSULT TO CARDIOLOGY  IP CONSULT TO HEART FAILURE NURSE/COORDINATOR  IP CONSULT TO DIETITIAN  IP CONSULT TO ORTHOPEDIC SURGERY  IP CONSULT TO SPIRITUAL SERVICES  IP CONSULT TO ORTHOPEDIC SURGERY    Procedures: echo     Left Ventricle: Normal left ventricular systolic function with EF of 60 - 65%. Left ventricle size is normal. Moderate concentric hypertrophy. Normal wall motion.  Abnormal diastolic function.    Left Atrium: Left atrium is mildly dilated.    Aortic Valve: Mild sclerosis of the aortic valve cusp. No significant stenosis.    Tricuspid Valve: Mild regurgitation. Mild pulmoanry hypertension, RVSP estiamted about 44mmHg.    Pericardium: Trivial pericardial effusion present. No indication of cardiac tamponade.    Image quality is adequate. Limited study due to patient's ability to tolerate test.    Prior study done 4/2019.    Hospital Course: Patient was admitted with Shortness of breath [R06.02]  Hypertensive urgency [I16.0]  Primary osteoarthritis of left shoulder [M19.012]  Acute systolic CHF (congestive heart failure) (HCC) [I50.21]  Acute decompensated heart failure (HCC) [I50.9]  New onset of congestive heart failure (HCC) [I50.9]. Patient is a 84 y.o. Non- / non  female who presents with Joint Pain (Patient reporting joint pain to all extremities. Worse in the left arm. )   and is admitted to the hospital for the management of Acute decompensated heart failure (HCC).     84-year-old female with a past medical history of atrial fibrillation on digoxin and Xarelto presents emergency department for left shoulder pain.  Patient is presented by her daughter and provides most of the history.  Patient states that she does have orthopnea.  She denies any PND.  She is currently on room air.  She has been compliant with home medications or outpatient follow-ups.  She denies chest pain, nausea, vomiting, diarrhea, shortness, cough, headache, numbness, or tingling.     Pt seen and examined by consultants. Pt had medications adjusted. Pt/ot evaluated pt. Initially resistant to snf but reconsidered and wants to go for rehab. On day of discharge, pt denied fevers, chills, n/v. Discharge planning d/w pt. Time given for questions and all questions answered.    D/w cardiology who knows her well. General direction of care is to continue to provide medications but likely will not

## 2024-05-29 ENCOUNTER — TELEPHONE (OUTPATIENT)
Dept: AUDIOLOGY | Age: 84
End: 2024-05-29

## 2024-05-29 NOTE — TELEPHONE ENCOUNTER
Received voice mail from NH facility to cancel audiology appointment, for 5/29/24.  Will call to reschedule.    Lowell Horner CCC/A  Audiologist  [id number]  NPI#:  [id number]

## 2024-06-18 ENCOUNTER — TELEPHONE (OUTPATIENT)
Dept: AUDIOLOGY | Age: 84
End: 2024-06-18

## 2024-06-18 NOTE — TELEPHONE ENCOUNTER
Patient's NH facility called to schedule a hearing aid check and cleaning.  Patient is currently at [facility] in [place].  Please call Odilia:  [phone] to schedule patient at either [facility] or [facility].    Lowell Horner CCC/DEEDEE  Audiologist  [id number]  NPI#:  [id number]

## 2024-06-19 ENCOUNTER — TELEPHONE (OUTPATIENT)
Dept: ADMINISTRATIVE | Age: 84
End: 2024-06-19

## 2024-06-19 NOTE — TELEPHONE ENCOUNTER
MA spoke with Kathe to offer next available with Gertrudis or Patrick. Kathe states she actually doesn't want to transfer patient to [place], she spoke with [facility] office and patient is already scheduled for 7/26/24.    Electronically signed by Latisha Lyon MA on 6/19/24 at 1:58 PM EDT

## 2024-06-19 NOTE — TELEPHONE ENCOUNTER
Kathe at [facility] requesting to transfer care to [facility] due to location and transportation. Current patient of Dr. Carpenter. Please advise if okay to transfer

## 2024-06-19 NOTE — TELEPHONE ENCOUNTER
Called Odilia and she stated that patient is fine with hearing aid check at same time as ENT (Dr Carpenter) appt on 7/26

## 2024-07-09 ENCOUNTER — PROCEDURE VISIT (OUTPATIENT)
Dept: AUDIOLOGY | Age: 84
End: 2024-07-09

## 2024-07-09 DIAGNOSIS — H90.3 SENSORINEURAL HEARING LOSS, BILATERAL: Primary | ICD-10-CM

## 2024-07-09 PROCEDURE — 99024 POSTOP FOLLOW-UP VISIT: CPT | Performed by: AUDIOLOGIST

## 2024-07-09 NOTE — PROGRESS NOTES
Patient seen for hearing aid cleaning and check.  No other issues noted.    Lowell Horner CCC/A  Audiologist  [id number]  NPI#:  [id number]

## 2024-07-23 ENCOUNTER — TELEPHONE (OUTPATIENT)
Dept: AUDIOLOGY | Age: 84
End: 2024-07-23

## 2024-07-23 NOTE — TELEPHONE ENCOUNTER
Patient's family member called and left a voicemail message requesting appointment time Friday.   Returned patient message. Left a voicemail at the patient contact number.    Electronically signed by Janett Perez on 7/23/2024 at 12:55 PM

## 2024-07-26 ENCOUNTER — OFFICE VISIT (OUTPATIENT)
Dept: ENT CLINIC | Age: 84
End: 2024-07-26
Payer: MEDICARE

## 2024-07-26 ENCOUNTER — PROCEDURE VISIT (OUTPATIENT)
Dept: AUDIOLOGY | Age: 84
End: 2024-07-26
Payer: MEDICARE

## 2024-07-26 VITALS
HEIGHT: 59 IN | HEART RATE: 130 BPM | SYSTOLIC BLOOD PRESSURE: 175 MMHG | DIASTOLIC BLOOD PRESSURE: 103 MMHG | WEIGHT: 135 LBS | BODY MASS INDEX: 27.21 KG/M2

## 2024-07-26 DIAGNOSIS — H90.3 SENSORINEURAL HEARING LOSS, BILATERAL: Primary | ICD-10-CM

## 2024-07-26 DIAGNOSIS — H90.3 SENSORINEURAL HEARING LOSS (SNHL) OF BOTH EARS: Primary | ICD-10-CM

## 2024-07-26 PROCEDURE — 1124F ACP DISCUSS-NO DSCNMKR DOCD: CPT | Performed by: OTOLARYNGOLOGY

## 2024-07-26 PROCEDURE — 92567 TYMPANOMETRY: CPT

## 2024-07-26 PROCEDURE — G8427 DOCREV CUR MEDS BY ELIG CLIN: HCPCS | Performed by: OTOLARYNGOLOGY

## 2024-07-26 PROCEDURE — G8417 CALC BMI ABV UP PARAM F/U: HCPCS | Performed by: OTOLARYNGOLOGY

## 2024-07-26 PROCEDURE — 92557 COMPREHENSIVE HEARING TEST: CPT

## 2024-07-26 PROCEDURE — 1036F TOBACCO NON-USER: CPT | Performed by: OTOLARYNGOLOGY

## 2024-07-26 PROCEDURE — 1090F PRES/ABSN URINE INCON ASSESS: CPT | Performed by: OTOLARYNGOLOGY

## 2024-07-26 PROCEDURE — 99213 OFFICE O/P EST LOW 20 MIN: CPT | Performed by: OTOLARYNGOLOGY

## 2024-07-26 NOTE — PROGRESS NOTES
This patient was referred for audiometric and tympanometric testing by Dr. Carpenter due to hearing loss.     Audiometry using pure tone air and bone conduction testing revealed a moderately-severe to severe sensorineural hearing loss, in the right ear. The left ear revealed a severe to profound hearing loss. Reliability was good. Speech reception thresholds were in good agreement with the pure tone averages, bilaterally. Speech discrimination scores were poor (44%), in the right ear and poor (64%), in the left ear.    Tympanometry revealed normal middle ear peak pressure and compliance, bilaterally.    The results were reviewed with the patient and ordering provider.     Recommendations for follow up will be made pending physician consult.    Patient's hearing aids were cleaned and checked. Patient questioned why audiologist did not take her to clean them and left her in the huff. Explained to patient that it was a quick clean and check with an alcohol wipe around the edges. Listened to both hearing aids, both were working properly. Patient expressed frustration that \"it must be her then\" that her hearing aids were not working properly.    Janett Curry/CCC-A  [place] Lic [id number]  Electronically signed by Janett Curry on 7/26/2024 at 11:59 AM

## 2024-07-26 NOTE — PROGRESS NOTES
[facility]  Dr. Rodriguez. ASTRID Carpenter. Ms.Ed        Patient Name:  Caitlyn Mitchell  :  1940     CHIEF C/O:    Chief Complaint   Patient presents with    Follow-up     1 year follow up [facility]       HISTORY OBTAINED FROM:  patient    HISTORY OF PRESENT ILLNESS:       Caitlyn is a 84 y.o. year old female, here today for yearly audio. She is utilizing her hearing aids without much improvement.  She did attend a CI seminar and has determined she is not interested in this due to the need for surgery and rehabilitation.    Past Medical History:   Diagnosis Date    Anxiety     no medications, becomes short of breath when upset/ anxious    Cancer (HCC)     uterine    DJD (degenerative joint disease) 2014    Rt. RICHARD 2014 ZANE Clayton MD    History of Holter monitoring 2022    Hyperlipidemia     no meds    Hypertension     Irregular heart beat     1 episode while hospitalized for kidney stone    Osteoarthritis 2016    Lt. RICHARD 2016 ZANE Clayton MD    Preoperative clearance     Dr Petersen, [facility] for right hip arthroplasty     Renal calculi     for or 2015    Ureteral perforation secondary to stent manipulation 2015     Past Surgical History:   Procedure Laterality Date    CHOLECYSTECTOMY N/A     HYSTERECTOMY (CERVIX STATUS UNKNOWN) N/A     LITHOTRIPSY Left 2015    LEFT RENAL CALCULI    OTHER SURGICAL HISTORY  2015    Cystoscopy retrograde pyelogram    SKIN BIOPSY      skin cancer nose    TOTAL HIP ARTHROPLASTY Right 2014    right hip ZANE Clayton MD    TOTAL HIP ARTHROPLASTY Left 2016    left hip ZANE Clayton MD       Current Outpatient Medications:     cetirizine (ZYRTEC) 5 MG tablet, Take 1 tablet by mouth daily, Disp: , Rfl:     valsartan (DIOVAN) 40 MG tablet, Take 1 tablet by mouth daily, Disp: 30 tablet, Rfl: 3    metoprolol succinate (TOPROL XL) 25 MG extended release tablet, Take 1 tablet by mouth daily, Disp: 30 tablet, Rfl: 3    digoxin

## 2024-09-04 ENCOUNTER — HOSPITAL ENCOUNTER (EMERGENCY)
Age: 84
Discharge: HOME | End: 2024-09-04
Payer: MEDICARE

## 2024-09-04 ENCOUNTER — APPOINTMENT (OUTPATIENT)
Dept: CT IMAGING | Age: 84
End: 2024-09-04
Payer: MEDICARE

## 2024-09-04 VITALS
SYSTOLIC BLOOD PRESSURE: 148 MMHG | WEIGHT: 130 LBS | TEMPERATURE: 98.6 F | OXYGEN SATURATION: 97 % | BODY MASS INDEX: 26.26 KG/M2 | DIASTOLIC BLOOD PRESSURE: 88 MMHG | RESPIRATION RATE: 18 BRPM | HEART RATE: 88 BPM

## 2024-09-04 DIAGNOSIS — S09.90XA INJURY OF HEAD, INITIAL ENCOUNTER: Primary | ICD-10-CM

## 2024-09-04 DIAGNOSIS — I10 HYPERTENSION, UNSPECIFIED TYPE: ICD-10-CM

## 2024-09-04 DIAGNOSIS — S00.03XA CONTUSION OF SCALP, INITIAL ENCOUNTER: ICD-10-CM

## 2024-09-04 PROCEDURE — 70450 CT HEAD/BRAIN W/O DYE: CPT

## 2024-09-04 PROCEDURE — 72125 CT NECK SPINE W/O DYE: CPT

## 2024-09-04 PROCEDURE — 99211 OFF/OP EST MAY X REQ PHY/QHP: CPT

## 2024-09-04 ASSESSMENT — PAIN - FUNCTIONAL ASSESSMENT: PAIN_FUNCTIONAL_ASSESSMENT: 0-10

## 2024-09-04 ASSESSMENT — PAIN DESCRIPTION - DESCRIPTORS: DESCRIPTORS: ACHING;DISCOMFORT

## 2024-09-04 ASSESSMENT — PAIN DESCRIPTION - LOCATION: LOCATION: HEAD

## 2024-09-04 ASSESSMENT — PAIN SCALES - GENERAL: PAINLEVEL_OUTOF10: 8

## 2024-09-04 NOTE — DISCHARGE INSTR - COC
Impairments/Disabilities:583529736}    Nutrition Therapy:  Current Nutrition Therapy:   { ZACK Diet List:412710819}    Routes of Feeding: {[facility] DME Other Feedings:489295027}  Liquids: {Slp liquid thickness:48289}  Daily Fluid Restriction: {CHP DME Yes amt example:121331698}  Last Modified Barium Swallow with Video (Video Swallowing Test): {Done Not Done Date:}    Treatments at the Time of Hospital Discharge:   Respiratory Treatments: ***  Oxygen Therapy:  {Therapy; copd oxygen:04985}  Ventilator:    { CC Vent List:304791637}    Rehab Therapies: {THERAPEUTIC INTERVENTION:2757544257}  Weight Bearing Status/Restrictions: { CC Weight Bearin}  Other Medical Equipment (for information only, NOT a DME order):  {EQUIPMENT:753881903}  Other Treatments: ***    Patient's personal belongings (please select all that are sent with patient):  {[facility] DME Belongings:862661846}    RN SIGNATURE:  {Esignature:658859536}    CASE MANAGEMENT/SOCIAL WORK SECTION    Inpatient Status Date: ***    Readmission Risk Assessment Score:  Readmission Risk              Risk of Unplanned Readmission:  0           Discharging to Facility/ Agency   Name:   Address:  Phone:  Fax:    Dialysis Facility (if applicable)   Name:  Address:  Dialysis Schedule:  Phone:  Fax:    / signature: {Esignature:159506198}    PHYSICIAN SECTION    Prognosis: {Prognosis:9177326143}    Condition at Discharge: { Patient Condition:199361168}    Rehab Potential (if transferring to Rehab): {Prognosis:8880790370}    Recommended Labs or Other Treatments After Discharge: ***    Physician Certification: I certify the above information and transfer of Caitlyn Mitchell  is necessary for the continuing treatment of the diagnosis listed and that she requires {Admit to Appropriate Level of Care:73406} for {GREATER/LESS:305510364} 30 days.     Update Admission H&P: {CHP DME Changes in HandP:893227337}    PHYSICIAN SIGNATURE:

## 2024-09-04 NOTE — ED PROVIDER NOTES
[facility] URGENT CARE  EMERGENCY DEPARTMENT ENCOUNTER        NAME: Caitlyn Mitchell  :  1940  MRN:  [id number]  Date of evaluation: 2024  Provider: Willie Teresa PA-C  PCP: Miguel Coleman MD  Note Started : 5:20 PM EDT 24    Chief Complaint: Head Injury (+blood thinners ) and Nausea      This is a an 84-year-old female that presents to urgent care with her friends.  About an hour prior to arrival the patient lost her balance and hit her head.  There was no loss of consciousness.  Initially she fell little bit nausea but that has gone away.  No vomiting.  Patient is on a blood thinner.  She denies any chest pain or shortness of breath.  No abdominal pain nausea vomiting diarrhea urinary symptoms.  On first contact patient she appears to be in no acute distress.        Review of Systems  Pertinent positives and negatives are stated within HPI, all other systems reviewed and are negative.     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    Past Surgical History:  has a past surgical history that includes Cholecystectomy (N/A); Hysterectomy (N/A); skin biopsy; other surgical history (2015); Lithotripsy (Left, 2015); Total hip arthroplasty (Right, 2014); and Total hip arthroplasty (Left, 2016).    Social History:  reports that she has never smoked. She has never used smokeless tobacco. She reports current alcohol use. She reports that she does not use

## 2024-09-04 NOTE — ED NOTES
Discharge instructions given over the phone by PA and patient discharged from CT      Rosalva Mercedes LPN  09/04/24 0913

## 2024-09-04 NOTE — ED NOTES
Friends to transport to [facility] for CT, CT notified.      Rosalva Mercedes LPN  09/04/24 8121

## 2024-09-10 ENCOUNTER — TELEPHONE (OUTPATIENT)
Dept: AUDIOLOGY | Age: 84
End: 2024-09-10

## 2024-09-16 ENCOUNTER — PROCEDURE VISIT (OUTPATIENT)
Dept: AUDIOLOGY | Age: 84
End: 2024-09-16

## 2024-09-16 DIAGNOSIS — H90.3 SENSORINEURAL HEARING LOSS, BILATERAL: Primary | ICD-10-CM

## 2024-09-18 ENCOUNTER — TELEPHONE (OUTPATIENT)
Dept: AUDIOLOGY | Age: 84
End: 2024-09-18

## 2024-09-24 ENCOUNTER — TELEPHONE (OUTPATIENT)
Dept: AUDIOLOGY | Age: 84
End: 2024-09-24

## 2024-10-23 ENCOUNTER — PROCEDURE VISIT (OUTPATIENT)
Dept: AUDIOLOGY | Age: 84
End: 2024-10-23

## 2024-10-23 DIAGNOSIS — H90.3 SENSORINEURAL HEARING LOSS, BILATERAL: Primary | ICD-10-CM

## 2024-10-23 PROCEDURE — 99024 POSTOP FOLLOW-UP VISIT: CPT | Performed by: AUDIOLOGIST

## 2024-10-23 NOTE — PROGRESS NOTES
Patient seen for pickup of right under-warranty repair hearing aid/ear mold.  Hearing aids re-synced.  No other issues noted.  Patient to return in 1 month, for hearing aid cleaning.    Lowell Horner CCC/A  Audiologist  [id number]  NPI#:  [id number]

## 2024-10-24 ENCOUNTER — TELEPHONE (OUTPATIENT)
Dept: AUDIOLOGY | Age: 84
End: 2024-10-24

## 2024-10-24 NOTE — TELEPHONE ENCOUNTER
Patient presented to [facility], with no appointment, on 10/24/24, demanding to see audiology services.  Patient proceeded to treat front office staff rudely and was yelling at them, because her preferred audiologist was not in that office.  She demanded that the staff member covering the department should see her and that she will not leave the building until she is seen.  She demanded to speak to the , per the [facility] staff.  Audiology manager contacted by staff audiologist with the report that patient was being rude and yelling at [facility] staff.  Manager immediately called the [facility] desk (with Kathy Burns present as a witness to the call) and asked to speak to the patient to explain that preferred audiologist was not in the office and that she would not be seen by the covering staff person, due to her treatment of this audiologist, at a prior appointment and the [facility] staff, at this time.  Audiology manager then spoke to N manager and explained the situation and asked if she would tell the patient she would not be seen today and that she can have an appointment scheduled for Monday, 10/28/24.  Gave the N  number for audiology manager, for any issues.  This is a repeated pattern of abusive behavior that is documented in the patient chart, in office notes and in telephone encounters.  Depending on 10/28/24 appointment, steps may be initiated to dismiss this patient, from the audiology practice at [facility].    Lowell Horner CCC/DEEDEE  Audiologist  [id number]  NPI#:  [id number]

## 2024-10-28 ENCOUNTER — TELEPHONE (OUTPATIENT)
Dept: AUDIOLOGY | Age: 84
End: 2024-10-28

## 2024-10-28 ENCOUNTER — PROCEDURE VISIT (OUTPATIENT)
Dept: AUDIOLOGY | Age: 84
End: 2024-10-28

## 2024-10-28 DIAGNOSIS — H90.3 SENSORINEURAL HEARING LOSS, BILATERAL: Primary | ICD-10-CM

## 2024-10-28 PROCEDURE — 99024 POSTOP FOLLOW-UP VISIT: CPT | Performed by: AUDIOLOGIST

## 2024-10-28 NOTE — TELEPHONE ENCOUNTER
Patient requested that her ENT/Audiology appointments for 7/2025 be cancelled.  She does not want scheduled for any follow-ups.    Instructed the patient to not present to the department, unless she has an appointment, for a variety of reasons (not going to be see when other patients are scheduled and only the audiology manager will see this patient).    Patient asked if there is anything else that can be done with hearing.  Reminded patient that CI surgery has been recommended in the past.    Patient's response:  so, if I am bleeding to death, I should not show up.    Audiology manager response:  if you are bleeding to death, you should present to an emergency room.    Patient is now complaining about the  repair turn-around time.  She feels there should be somebody in the [place] area, that can repair hearing aids.    Lowell Horner CCC/A  Audiologist  [id number]  NPI#:  [id number]

## 2024-10-28 NOTE — PROGRESS NOTES
Patient seen for hearing aid fit issue.  Requested that removal line be removed and hearing aid be slightly modified, due to pain at the bottom of the earmold.    No other issues noted.  SEE TELEPHONE ENCOUNTER FROM THIS DAY.    Lowell Horner CCC/A  Audiologist  [id number]  NPI#:  [id number]

## 2024-10-28 NOTE — TELEPHONE ENCOUNTER
After this patient demanded that a taxi be called to give her a ride home, the person that initially dropped her off at her appointment, for 10/28/24, came to pick her up.    Patient stated, during her audiology appointment, that the person that brought her, could not give her a ride home and that a taxi be called for, which was completed by audiology manager.    Asked the patient's ride if he wanted to call and make sure she made it home okay, he replied:  \"NO.  I am not going to get yelled at by her.\"    Lowell Horner CCC/A  Audiologist  [id number]  NPI#:  [id number]

## 2024-11-18 ENCOUNTER — TELEPHONE (OUTPATIENT)
Dept: AUDIOLOGY | Age: 84
End: 2024-11-18

## 2024-11-18 NOTE — TELEPHONE ENCOUNTER
Attempted to contact patient to confirm 11/20/24 appointment.    No answer/no voice mail.    Lowell Horner CCC/A  Audiologist  [id number]  NPI#:  [id number]

## 2024-11-18 NOTE — TELEPHONE ENCOUNTER
Patient called and left a voicemail message.   Tried to call back, however no answer/no voice mail.    Electronically signed by Janett Patel on 11/18/2024 at 1:13 PM

## 2024-11-20 ENCOUNTER — PROCEDURE VISIT (OUTPATIENT)
Dept: AUDIOLOGY | Age: 84
End: 2024-11-20

## 2024-11-20 DIAGNOSIS — H90.3 SENSORINEURAL HEARING LOSS, BILATERAL: Primary | ICD-10-CM

## 2024-11-20 PROCEDURE — 99024 POSTOP FOLLOW-UP VISIT: CPT | Performed by: AUDIOLOGIST

## 2024-11-20 NOTE — PROGRESS NOTES
Hearing aids cleaned and checked.    No other issues noted.    Patient to return in 4 weeks.    Lowell Horner CCC/A  Audiologist  [id number]  NPI#:  [id number]

## 2024-12-03 ENCOUNTER — TELEPHONE (OUTPATIENT)
Dept: AUDIOLOGY | Age: 84
End: 2024-12-03

## 2024-12-03 NOTE — TELEPHONE ENCOUNTER
Patient called and left a voicemail message.   She is trying to get in contact with Lowell or Devika. Will send note to Lowell to call her.  Friend's number to call: [phone]    Electronically signed by Janett Perez on 12/3/2024 at 4:44 PM

## 2024-12-03 NOTE — TELEPHONE ENCOUNTER
Patient called and left a voicemail message.   Returned patient message. Left a voicemail at the patient contact number.    Electronically signed by Janett Patel on 12/3/2024 at 8:03 AM    
No pertinent past medical history

## 2024-12-06 ENCOUNTER — PROCEDURE VISIT (OUTPATIENT)
Dept: AUDIOLOGY | Age: 84
End: 2024-12-06

## 2024-12-06 DIAGNOSIS — H90.3 SENSORINEURAL HEARING LOSS, BILATERAL: Primary | ICD-10-CM

## 2024-12-06 PROCEDURE — 99024 POSTOP FOLLOW-UP VISIT: CPT | Performed by: AUDIOLOGIST

## 2024-12-06 NOTE — PROGRESS NOTES
Hearing aids cleaned/checked.    No other issues noted.    Lowell Horner CCC/A  Audiologist  [id number]  NPI#:  [id number]

## 2024-12-16 ENCOUNTER — PROCEDURE VISIT (OUTPATIENT)
Dept: AUDIOLOGY | Age: 84
End: 2024-12-16

## 2024-12-16 DIAGNOSIS — H90.3 SENSORINEURAL HEARING LOSS, BILATERAL: Primary | ICD-10-CM

## 2024-12-16 PROCEDURE — 99024 POSTOP FOLLOW-UP VISIT: CPT | Performed by: AUDIOLOGIST

## 2024-12-16 NOTE — PROGRESS NOTES
Patient seen for hearing aid cleaning/check.  Patient given hearing aid cleaning tools and given instructions on there use.    No other issues noted.    Patient wanted to know why she cannot be seen by anyone at the office and the documented instances of her behavior in the past to both the [facility] and Audiology staff were reviewed.  Reviewed that the only audiology staff person that will see her is the audiology manager and that she cannot be scheduled with any other staff person, in the department, at any site.    Lowell Horner CCC/A  Audiologist  [id number]  NPI#:  [id number]

## 2025-01-07 ENCOUNTER — PROCEDURE VISIT (OUTPATIENT)
Dept: AUDIOLOGY | Age: 85
End: 2025-01-07

## 2025-01-07 DIAGNOSIS — H90.3 SENSORINEURAL HEARING LOSS, BILATERAL: Primary | ICD-10-CM

## 2025-01-07 PROCEDURE — 99024 POSTOP FOLLOW-UP VISIT: CPT | Performed by: AUDIOLOGIST

## 2025-01-07 NOTE — PROGRESS NOTES
Patient seen for hearing aid cleaning and check.    Patient presented with her niece, from [place], at this appointment.  Niece had several questions regarding hearing aids, cleaning and the hearing testing.  All questions were answered.  Niece inquired about the patient's refusal to see Otology regarding a CI, when it was recommended.   She was also informed of the patient cancelling her future ENT appointments.    Patient's niece was also educated on the cleaning of the hearing aids and changing the wax guards.  Patient was given another ear loop for HA wax removal and 3 packages of wax guards.    Patient scheduled to return in one month.    Lowell Horner CCC/DEEDEE  Audiologist  [id number]  NPI#:  [id number]

## 2025-01-17 ENCOUNTER — TELEPHONE (OUTPATIENT)
Dept: AUDIOLOGY | Age: 85
End: 2025-01-17

## 2025-01-17 NOTE — TELEPHONE ENCOUNTER
Returned niece's voice mail.    Left voice mail to return department call.    Lowell Horner CCC/DEEDEE  Audiologist  [id number]  NPI#:  [id number]

## 2025-01-24 ENCOUNTER — TELEPHONE (OUTPATIENT)
Dept: AUDIOLOGY | Age: 85
End: 2025-01-24

## 2025-01-24 NOTE — TELEPHONE ENCOUNTER
Patient called and LVM stating she needs to know the date and time of her appointment. Called and LVM on number she gave ([phone]) and stated that her hearing aid cleaning was on 2/5 at 1 PM.    Electronically signed by Janett Curry on 1/24/2025 at 11:19 AM

## 2025-01-29 ENCOUNTER — TELEPHONE (OUTPATIENT)
Dept: AUDIOLOGY | Age: 85
End: 2025-01-29

## 2025-01-29 NOTE — TELEPHONE ENCOUNTER
Returned patient's niece's voice mail.  Concerned about patient's ability to change wax guards, but she is out of town.  Tried to explain the process to her.    Will call patient to bring magnifying glass with her on 2/4/25 appointment.    Lowell Horner CCC/A  Audiologist  [id number]  NPI#:  [id number]

## 2025-02-04 ENCOUNTER — TELEPHONE (OUTPATIENT)
Dept: AUDIOLOGY | Age: 85
End: 2025-02-04

## 2025-02-04 NOTE — TELEPHONE ENCOUNTER
Called pt to cx per lamont.  She was not happy but then said there wad nothing we could do about it.  Told her lamont will  call her asap. Electronically signed by Janett Sheikh on 2/4/2025 at 10:47 AM

## 2025-02-13 ENCOUNTER — PROCEDURE VISIT (OUTPATIENT)
Dept: AUDIOLOGY | Age: 85
End: 2025-02-13

## 2025-02-13 ENCOUNTER — OFFICE VISIT (OUTPATIENT)
Dept: FAMILY MEDICINE CLINIC | Age: 85
End: 2025-02-13
Payer: MEDICARE

## 2025-02-13 VITALS
OXYGEN SATURATION: 96 % | HEART RATE: 98 BPM | DIASTOLIC BLOOD PRESSURE: 86 MMHG | WEIGHT: 130 LBS | RESPIRATION RATE: 19 BRPM | TEMPERATURE: 97.4 F | SYSTOLIC BLOOD PRESSURE: 138 MMHG | BODY MASS INDEX: 26.21 KG/M2 | HEIGHT: 59 IN

## 2025-02-13 DIAGNOSIS — R39.9 UTI SYMPTOMS: ICD-10-CM

## 2025-02-13 DIAGNOSIS — H90.3 SENSORINEURAL HEARING LOSS, BILATERAL: Primary | ICD-10-CM

## 2025-02-13 DIAGNOSIS — N30.01 ACUTE CYSTITIS WITH HEMATURIA: Primary | ICD-10-CM

## 2025-02-13 LAB
BILIRUBIN, POC: NORMAL
BLOOD URINE, POC: NORMAL
CLARITY, POC: CLEAR
COLOR, POC: YELLOW
GLUCOSE URINE, POC: NORMAL MG/DL
KETONES, POC: NORMAL MG/DL
LEUKOCYTE EST, POC: NORMAL
NITRITE, POC: NORMAL
PH, POC: 5
PROTEIN, POC: NORMAL MG/DL
SPECIFIC GRAVITY, POC: 1.01
UROBILINOGEN, POC: 0.2 MG/DL

## 2025-02-13 PROCEDURE — G8417 CALC BMI ABV UP PARAM F/U: HCPCS | Performed by: EMERGENCY MEDICINE

## 2025-02-13 PROCEDURE — 1090F PRES/ABSN URINE INCON ASSESS: CPT | Performed by: EMERGENCY MEDICINE

## 2025-02-13 PROCEDURE — 1124F ACP DISCUSS-NO DSCNMKR DOCD: CPT | Performed by: EMERGENCY MEDICINE

## 2025-02-13 PROCEDURE — 1159F MED LIST DOCD IN RCRD: CPT | Performed by: EMERGENCY MEDICINE

## 2025-02-13 PROCEDURE — 99213 OFFICE O/P EST LOW 20 MIN: CPT | Performed by: EMERGENCY MEDICINE

## 2025-02-13 PROCEDURE — 1036F TOBACCO NON-USER: CPT | Performed by: EMERGENCY MEDICINE

## 2025-02-13 PROCEDURE — 81002 URINALYSIS NONAUTO W/O SCOPE: CPT | Performed by: EMERGENCY MEDICINE

## 2025-02-13 PROCEDURE — G8427 DOCREV CUR MEDS BY ELIG CLIN: HCPCS | Performed by: EMERGENCY MEDICINE

## 2025-02-13 PROCEDURE — 99024 POSTOP FOLLOW-UP VISIT: CPT | Performed by: AUDIOLOGIST

## 2025-02-13 PROCEDURE — 1160F RVW MEDS BY RX/DR IN RCRD: CPT | Performed by: EMERGENCY MEDICINE

## 2025-02-13 RX ORDER — SULFAMETHOXAZOLE AND TRIMETHOPRIM 800; 160 MG/1; MG/1
1 TABLET ORAL 2 TIMES DAILY
Qty: 10 TABLET | Refills: 0 | Status: SHIPPED
Start: 2025-02-13 | End: 2025-02-16

## 2025-02-13 ASSESSMENT — ENCOUNTER SYMPTOMS
SINUS PRESSURE: 0
BACK PAIN: 0
EYE REDNESS: 0
EYE PAIN: 0
SORE THROAT: 0
EYE DISCHARGE: 0
NAUSEA: 0
SHORTNESS OF BREATH: 0
VOMITING: 0
DIARRHEA: 0
COUGH: 0
ABDOMINAL DISTENTION: 0
WHEEZING: 0

## 2025-02-13 NOTE — PROGRESS NOTES
Chief Complaint:   Urinary Tract Infection (Blood in urine )      History of Present Illness   Source of history provided by:  patient.      Caitlyn Mitchell is a 84 y.o. old female who has a past medical history of:   Past Medical History:   Diagnosis Date    Anxiety     no medications, becomes short of breath when upset/ anxious    Cancer (HCC) 2013    uterine    DJD (degenerative joint disease) 06/02/2014    Rt. RICHARD 6/2/2014 ZANE Clayton MD    History of Holter monitoring 09/02/2022    Hyperlipidemia     no meds    Hypertension     Irregular heart beat     1 episode while hospitalized for kidney stone    Osteoarthritis 07/18/2016    Lt. RICHARD 7/18/2016 ZANE Clayton MD    Preoperative clearance     Dr Petersen, medical for right hip arthroplasty 6/14    Renal calculi     for or 8/5/2015    Ureteral perforation secondary to stent manipulation 05/08/2015    presents to the [facility] care for dysuria.  Pt states the symptoms have progressed over the past 3 days.  Pt states they have had increased frequency, lower abdominal pressure, and occasional nausea.  No flank pain.  Denies any vaginal discharge, vaginal bleeding, vomiting, diarrhea, or lethargy.   No LMP recorded (lmp unknown). Patient has had a hysterectomy.      ROS   Review of Systems   Constitutional:  Negative for chills and fever.   HENT:  Negative for ear pain, sinus pressure and sore throat.    Eyes:  Negative for pain, discharge and redness.   Respiratory:  Negative for cough, shortness of breath and wheezing.    Cardiovascular:  Negative for chest pain.   Gastrointestinal:  Negative for abdominal distention, diarrhea, nausea and vomiting.   Genitourinary:  Positive for frequency and hematuria. Negative for dysuria.   Musculoskeletal:  Negative for arthralgias and back pain.   Skin:  Negative for rash and wound.   Neurological:  Negative for weakness and headaches.   Hematological:  Negative for adenopathy.   Psychiatric/Behavioral: Negative.     All other systems

## 2025-02-13 NOTE — PROGRESS NOTES
Patient seen for hearing aid cleaning.    No other issues noted.    Lowell Horner CCC/A  Audiologist  [id number]  NPI#:  [id number]

## 2025-02-14 LAB
CULTURE: NORMAL
SPECIMEN DESCRIPTION: NORMAL

## 2025-02-16 ENCOUNTER — APPOINTMENT (OUTPATIENT)
Dept: CT IMAGING | Age: 85
DRG: 309 | End: 2025-02-16
Payer: MEDICARE

## 2025-02-16 ENCOUNTER — APPOINTMENT (OUTPATIENT)
Dept: GENERAL RADIOLOGY | Age: 85
DRG: 309 | End: 2025-02-16
Payer: MEDICARE

## 2025-02-16 ENCOUNTER — HOSPITAL ENCOUNTER (INPATIENT)
Age: 85
LOS: 3 days | Discharge: HOME | DRG: 309 | End: 2025-02-19
Attending: EMERGENCY MEDICINE | Admitting: INTERNAL MEDICINE
Payer: MEDICARE

## 2025-02-16 DIAGNOSIS — S22.42XA CLOSED FRACTURE OF MULTIPLE RIBS OF LEFT SIDE, INITIAL ENCOUNTER: Primary | ICD-10-CM
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
## 2025-02-16 DIAGNOSIS — E04.2 MULTIPLE THYROID NODULES: ICD-10-CM

## 2025-02-16 PROBLEM — I48.91 ATRIAL FIBRILLATION WITH RAPID VENTRICULAR RESPONSE (HCC): Status: ACTIVE | Noted: 2025-02-16

## 2025-02-16 LAB
ABO + RH BLD: NORMAL
ALBUMIN SERPL-MCNC: 4 G/DL (ref 3.5–5.2)
ALP SERPL-CCNC: 144 U/L (ref 35–104)
ALT SERPL-CCNC: 24 U/L (ref 0–32)
ANION GAP SERPL CALCULATED.3IONS-SCNC: 14 MMOL/L (ref 7–16)
ARM BAND NUMBER: NORMAL
AST SERPL-CCNC: 29 U/L (ref 0–31)
BASOPHILS # BLD: 0.03 K/UL (ref 0–0.2)
BASOPHILS NFR BLD: 0 % (ref 0–2)
BILIRUB SERPL-MCNC: 0.5 MG/DL (ref 0–1.2)
BILIRUB UR QL STRIP: NEGATIVE
BLOOD BANK SAMPLE EXPIRATION: NORMAL
BLOOD GROUP ANTIBODIES SERPL: NEGATIVE
BUN SERPL-MCNC: 25 MG/DL (ref 6–23)
CALCIUM SERPL-MCNC: 9.7 MG/DL (ref 8.6–10.2)
CHLORIDE SERPL-SCNC: 102 MMOL/L (ref 98–107)
CLARITY UR: ABNORMAL
CO2 SERPL-SCNC: 23 MMOL/L (ref 22–29)
COLOR UR: ABNORMAL
CREAT SERPL-MCNC: 1.4 MG/DL (ref 0.5–1)
EOSINOPHIL # BLD: 0.05 K/UL (ref 0.05–0.5)
EOSINOPHILS RELATIVE PERCENT: 1 % (ref 0–6)
ERYTHROCYTE [DISTWIDTH] IN BLOOD BY AUTOMATED COUNT: 14.6 % (ref 11.5–15)
GFR, ESTIMATED: 36 ML/MIN/1.73M2
GLUCOSE SERPL-MCNC: 83 MG/DL (ref 74–99)
GLUCOSE UR STRIP-MCNC: NEGATIVE MG/DL
HCT VFR BLD AUTO: 44.5 % (ref 34–48)
HGB BLD-MCNC: 14.4 G/DL (ref 11.5–15.5)
HGB UR QL STRIP.AUTO: ABNORMAL
IMM GRANULOCYTES # BLD AUTO: 0.05 K/UL (ref 0–0.58)
IMM GRANULOCYTES NFR BLD: 1 % (ref 0–5)
INR PPP: 2.8
KETONES UR STRIP-MCNC: NEGATIVE MG/DL
LACTATE BLDV-SCNC: 1.5 MMOL/L (ref 0.5–2.2)
LEUKOCYTE ESTERASE UR QL STRIP: NEGATIVE
LYMPHOCYTES NFR BLD: 0.64 K/UL (ref 1.5–4)
LYMPHOCYTES RELATIVE PERCENT: 7 % (ref 20–42)
MCH RBC QN AUTO: 30.3 PG (ref 26–35)
MCHC RBC AUTO-ENTMCNC: 32.4 G/DL (ref 32–34.5)
MCV RBC AUTO: 93.5 FL (ref 80–99.9)
MONOCYTES NFR BLD: 0.64 K/UL (ref 0.1–0.95)
MONOCYTES NFR BLD: 7 % (ref 2–12)
NEUTROPHILS NFR BLD: 85 % (ref 43–80)
NEUTS SEG NFR BLD: 8.03 K/UL (ref 1.8–7.3)
NITRITE UR QL STRIP: POSITIVE
PH UR STRIP: 5.5 [PH] (ref 5–8)
PLATELET # BLD AUTO: 206 K/UL (ref 130–450)
PMV BLD AUTO: 10 FL (ref 7–12)
POTASSIUM SERPL-SCNC: 4.7 MMOL/L (ref 3.5–5)
PROT SERPL-MCNC: 6.6 G/DL (ref 6.4–8.3)
PROT UR STRIP-MCNC: 30 MG/DL
PROTHROMBIN TIME: 30.4 SEC (ref 9.3–12.4)
RBC # BLD AUTO: 4.76 M/UL (ref 3.5–5.5)
RBC #/AREA URNS HPF: ABNORMAL /HPF
SODIUM SERPL-SCNC: 139 MMOL/L (ref 132–146)
SP GR UR STRIP: 1.02 (ref 1–1.03)
TROPONIN I SERPL HS-MCNC: 28 NG/L (ref 0–9)
TROPONIN I SERPL HS-MCNC: 31 NG/L (ref 0–9)
UROBILINOGEN UR STRIP-ACNC: 0.2 EU/DL (ref 0–1)
WBC #/AREA URNS HPF: ABNORMAL /HPF
WBC OTHER # BLD: 9.4 K/UL (ref 4.5–11.5)

## 2025-02-16 PROCEDURE — 6370000000 HC RX 637 (ALT 250 FOR IP): Performed by: INTERNAL MEDICINE

## 2025-02-16 PROCEDURE — 0CQ0XZZ REPAIR UPPER LIP, EXTERNAL APPROACH: ICD-10-PCS | Performed by: INTERNAL MEDICINE

## 2025-02-16 PROCEDURE — 93005 ELECTROCARDIOGRAM TRACING: CPT | Performed by: EMERGENCY MEDICINE

## 2025-02-16 PROCEDURE — 83605 ASSAY OF LACTIC ACID: CPT

## 2025-02-16 PROCEDURE — 85025 COMPLETE CBC W/AUTO DIFF WBC: CPT

## 2025-02-16 PROCEDURE — 85610 PROTHROMBIN TIME: CPT

## 2025-02-16 PROCEDURE — 80053 COMPREHEN METABOLIC PANEL: CPT

## 2025-02-16 PROCEDURE — 2500000003 HC RX 250 WO HCPCS: Performed by: EMERGENCY MEDICINE

## 2025-02-16 PROCEDURE — 96374 THER/PROPH/DIAG INJ IV PUSH: CPT

## 2025-02-16 PROCEDURE — 99285 EMERGENCY DEPT VISIT HI MDM: CPT

## 2025-02-16 PROCEDURE — 72125 CT NECK SPINE W/O DYE: CPT

## 2025-02-16 PROCEDURE — 70486 CT MAXILLOFACIAL W/O DYE: CPT

## 2025-02-16 PROCEDURE — 6360000002 HC RX W HCPCS: Performed by: EMERGENCY MEDICINE

## 2025-02-16 PROCEDURE — 99223 1ST HOSP IP/OBS HIGH 75: CPT | Performed by: INTERNAL MEDICINE

## 2025-02-16 PROCEDURE — 81001 URINALYSIS AUTO W/SCOPE: CPT

## 2025-02-16 PROCEDURE — 74177 CT ABD & PELVIS W/CONTRAST: CPT

## 2025-02-16 PROCEDURE — 6370000000 HC RX 637 (ALT 250 FOR IP): Performed by: SURGERY

## 2025-02-16 PROCEDURE — 96375 TX/PRO/DX INJ NEW DRUG ADDON: CPT

## 2025-02-16 PROCEDURE — 86850 RBC ANTIBODY SCREEN: CPT

## 2025-02-16 PROCEDURE — 6360000004 HC RX CONTRAST MEDICATION: Performed by: RADIOLOGY

## 2025-02-16 PROCEDURE — 86900 BLOOD TYPING SEROLOGIC ABO: CPT

## 2025-02-16 PROCEDURE — 1200000000 HC SEMI PRIVATE

## 2025-02-16 PROCEDURE — 86901 BLOOD TYPING SEROLOGIC RH(D): CPT

## 2025-02-16 PROCEDURE — 73130 X-RAY EXAM OF HAND: CPT

## 2025-02-16 PROCEDURE — 6370000000 HC RX 637 (ALT 250 FOR IP): Performed by: EMERGENCY MEDICINE

## 2025-02-16 PROCEDURE — 84484 ASSAY OF TROPONIN QUANT: CPT

## 2025-02-16 PROCEDURE — 71260 CT THORAX DX C+: CPT

## 2025-02-16 PROCEDURE — 70450 CT HEAD/BRAIN W/O DYE: CPT

## 2025-02-16 RX ORDER — ACETAMINOPHEN 325 MG/1
650 TABLET ORAL EVERY 6 HOURS PRN
Status: DISCONTINUED | OUTPATIENT
Start: 2025-02-16 | End: 2025-02-18

## 2025-02-16 RX ORDER — ONDANSETRON 4 MG/1
4 TABLET, ORALLY DISINTEGRATING ORAL EVERY 8 HOURS PRN
Status: DISCONTINUED | OUTPATIENT
Start: 2025-02-16 | End: 2025-02-19 | Stop reason: HOSPADM

## 2025-02-16 RX ORDER — ACETAMINOPHEN 650 MG/1
650 SUPPOSITORY RECTAL EVERY 6 HOURS PRN
Status: DISCONTINUED | OUTPATIENT
Start: 2025-02-16 | End: 2025-02-18

## 2025-02-16 RX ORDER — HYDRALAZINE HYDROCHLORIDE 25 MG/1
25 TABLET, FILM COATED ORAL 2 TIMES DAILY
COMMUNITY

## 2025-02-16 RX ORDER — LOSARTAN POTASSIUM 25 MG/1
25 TABLET ORAL DAILY
COMMUNITY

## 2025-02-16 RX ORDER — METOPROLOL SUCCINATE 25 MG/1
25 TABLET, EXTENDED RELEASE ORAL DAILY
Status: DISCONTINUED | OUTPATIENT
Start: 2025-02-16 | End: 2025-02-18

## 2025-02-16 RX ORDER — SULFAMETHOXAZOLE AND TRIMETHOPRIM 800; 160 MG/1; MG/1
1 TABLET ORAL 2 TIMES DAILY
Status: ON HOLD | COMMUNITY
End: 2025-02-19 | Stop reason: HOSPADM

## 2025-02-16 RX ORDER — LIDOCAINE 4 G/G
1 PATCH TOPICAL DAILY
Status: DISCONTINUED | OUTPATIENT
Start: 2025-02-16 | End: 2025-02-19 | Stop reason: HOSPADM

## 2025-02-16 RX ORDER — SODIUM CHLORIDE 9 MG/ML
INJECTION, SOLUTION INTRAVENOUS PRN
Status: DISCONTINUED | OUTPATIENT
Start: 2025-02-16 | End: 2025-02-19 | Stop reason: HOSPADM

## 2025-02-16 RX ORDER — ONDANSETRON 2 MG/ML
4 INJECTION INTRAMUSCULAR; INTRAVENOUS ONCE
Status: COMPLETED | OUTPATIENT
Start: 2025-02-16 | End: 2025-02-16

## 2025-02-16 RX ORDER — DIGOXIN 125 MCG
125 TABLET ORAL
Status: DISCONTINUED | OUTPATIENT
Start: 2025-02-16 | End: 2025-02-19 | Stop reason: HOSPADM

## 2025-02-16 RX ORDER — CETIRIZINE HYDROCHLORIDE 10 MG/1
5 TABLET ORAL DAILY
Status: DISCONTINUED | OUTPATIENT
Start: 2025-02-16 | End: 2025-02-17

## 2025-02-16 RX ORDER — ONDANSETRON 2 MG/ML
4 INJECTION INTRAMUSCULAR; INTRAVENOUS EVERY 6 HOURS PRN
Status: DISCONTINUED | OUTPATIENT
Start: 2025-02-16 | End: 2025-02-19 | Stop reason: HOSPADM

## 2025-02-16 RX ORDER — IOPAMIDOL 755 MG/ML
75 INJECTION, SOLUTION INTRAVASCULAR
Status: COMPLETED | OUTPATIENT
Start: 2025-02-16 | End: 2025-02-16

## 2025-02-16 RX ORDER — VALSARTAN 80 MG/1
40 TABLET ORAL DAILY
Status: DISCONTINUED | OUTPATIENT
Start: 2025-02-16 | End: 2025-02-17

## 2025-02-16 RX ORDER — DIPHENHYDRAMINE HYDROCHLORIDE 50 MG/ML
25 INJECTION INTRAMUSCULAR; INTRAVENOUS ONCE
Status: COMPLETED | OUTPATIENT
Start: 2025-02-16 | End: 2025-02-16

## 2025-02-16 RX ORDER — LIDOCAINE HYDROCHLORIDE 10 MG/ML
5 INJECTION, SOLUTION INFILTRATION; PERINEURAL ONCE
Status: COMPLETED | OUTPATIENT
Start: 2025-02-16 | End: 2025-02-16

## 2025-02-16 RX ORDER — SODIUM CHLORIDE 0.9 % (FLUSH) 0.9 %
5-40 SYRINGE (ML) INJECTION PRN
Status: DISCONTINUED | OUTPATIENT
Start: 2025-02-16 | End: 2025-02-19 | Stop reason: HOSPADM

## 2025-02-16 RX ORDER — VIT A/VIT C/VIT E/ZINC/COPPER 4296-226
1 CAPSULE ORAL 2 TIMES DAILY
COMMUNITY

## 2025-02-16 RX ORDER — POLYETHYLENE GLYCOL 3350 17 G/17G
17 POWDER, FOR SOLUTION ORAL DAILY PRN
Status: DISCONTINUED | OUTPATIENT
Start: 2025-02-16 | End: 2025-02-19 | Stop reason: HOSPADM

## 2025-02-16 RX ORDER — OXYCODONE AND ACETAMINOPHEN 5; 325 MG/1; MG/1
1 TABLET ORAL ONCE
Status: COMPLETED | OUTPATIENT
Start: 2025-02-16 | End: 2025-02-16

## 2025-02-16 RX ORDER — SODIUM CHLORIDE 0.9 % (FLUSH) 0.9 %
5-40 SYRINGE (ML) INJECTION EVERY 12 HOURS SCHEDULED
Status: DISCONTINUED | OUTPATIENT
Start: 2025-02-16 | End: 2025-02-19 | Stop reason: HOSPADM

## 2025-02-16 RX ADMIN — LIDOCAINE HYDROCHLORIDE 5 ML: 10 INJECTION, SOLUTION INFILTRATION; PERINEURAL at 14:28

## 2025-02-16 RX ADMIN — IOPAMIDOL 75 ML: 755 INJECTION, SOLUTION INTRAVENOUS at 13:13

## 2025-02-16 RX ADMIN — METOPROLOL SUCCINATE 25 MG: 25 TABLET, FILM COATED, EXTENDED RELEASE ORAL at 18:17

## 2025-02-16 RX ADMIN — OXYCODONE AND ACETAMINOPHEN 1 TABLET: 5; 325 TABLET ORAL at 16:08

## 2025-02-16 RX ADMIN — DIGOXIN 125 MCG: 125 TABLET ORAL at 18:16

## 2025-02-16 RX ADMIN — WATER 40 MG: 1 INJECTION INTRAMUSCULAR; INTRAVENOUS; SUBCUTANEOUS at 12:36

## 2025-02-16 RX ADMIN — ONDANSETRON 4 MG: 2 INJECTION, SOLUTION INTRAMUSCULAR; INTRAVENOUS at 12:45

## 2025-02-16 RX ADMIN — VALSARTAN 40 MG: 40 TABLET, FILM COATED ORAL at 22:54

## 2025-02-16 RX ADMIN — DIPHENHYDRAMINE HYDROCHLORIDE 25 MG: 50 INJECTION INTRAMUSCULAR; INTRAVENOUS at 12:39

## 2025-02-16 ASSESSMENT — PAIN DESCRIPTION - DIRECTION: RADIATING_TOWARDS: R RIBS

## 2025-02-16 ASSESSMENT — PAIN DESCRIPTION - PAIN TYPE: TYPE: ACUTE PAIN

## 2025-02-16 ASSESSMENT — PAIN - FUNCTIONAL ASSESSMENT
PAIN_FUNCTIONAL_ASSESSMENT: ACTIVITIES ARE NOT PREVENTED
PAIN_FUNCTIONAL_ASSESSMENT: PREVENTS OR INTERFERES SOME ACTIVE ACTIVITIES AND ADLS

## 2025-02-16 ASSESSMENT — PAIN DESCRIPTION - DESCRIPTORS: DESCRIPTORS: ACHING;CRAMPING;DISCOMFORT

## 2025-02-16 ASSESSMENT — PAIN SCALES - GENERAL
PAINLEVEL_OUTOF10: 10
PAINLEVEL_OUTOF10: 10

## 2025-02-16 ASSESSMENT — PAIN DESCRIPTION - ONSET: ONSET: ON-GOING

## 2025-02-16 ASSESSMENT — PAIN DESCRIPTION - ORIENTATION: ORIENTATION: RIGHT;UPPER

## 2025-02-16 ASSESSMENT — PAIN DESCRIPTION - FREQUENCY: FREQUENCY: CONTINUOUS

## 2025-02-16 ASSESSMENT — PAIN DESCRIPTION - LOCATION: LOCATION: RIB CAGE

## 2025-02-16 NOTE — ED NOTES
Name: Caitlyn Mitchell  : 1940  MRN: [id number]    Date: 2025    Benefits of immediately proceeding with Radiology exam outweigh the risks and therefore the following is being waived:      [] Pregnancy test    [x] Protocol for Iodine allergy    [] MRI questionnaire    [x] BUN/Creatinine        MD Janet Glynn Shilp, MD  25 1221       Jasmyne Gonzales MD  25 1242     eyeglasses
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
## 2025-02-17 PROBLEM — E04.2 MULTIPLE THYROID NODULES: Status: ACTIVE | Noted: 2025-02-17
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
## 2025-02-17 PROBLEM — S22.42XA MULTIPLE CLOSED FRACTURES OF RIBS OF LEFT SIDE: Status: ACTIVE | Noted: 2025-02-17

## 2025-02-17 PROBLEM — N30.01 ACUTE CYSTITIS WITH HEMATURIA: Status: ACTIVE | Noted: 2025-02-17

## 2025-02-17 LAB
BASOPHILS # BLD: 0.01 K/UL (ref 0–0.2)
BASOPHILS NFR BLD: 0 % (ref 0–2)
EOSINOPHIL # BLD: 0.03 K/UL (ref 0.05–0.5)
EOSINOPHILS RELATIVE PERCENT: 0 % (ref 0–6)
ERYTHROCYTE [DISTWIDTH] IN BLOOD BY AUTOMATED COUNT: 14.7 % (ref 11.5–15)
HCT VFR BLD AUTO: 38.1 % (ref 34–48)
HGB BLD-MCNC: 12.4 G/DL (ref 11.5–15.5)
IMM GRANULOCYTES # BLD AUTO: 0.03 K/UL (ref 0–0.58)
IMM GRANULOCYTES NFR BLD: 0 % (ref 0–5)
LYMPHOCYTES NFR BLD: 0.77 K/UL (ref 1.5–4)
LYMPHOCYTES RELATIVE PERCENT: 11 % (ref 20–42)
MCH RBC QN AUTO: 30 PG (ref 26–35)
MCHC RBC AUTO-ENTMCNC: 32.5 G/DL (ref 32–34.5)
MCV RBC AUTO: 92.3 FL (ref 80–99.9)
MONOCYTES NFR BLD: 0.7 K/UL (ref 0.1–0.95)
MONOCYTES NFR BLD: 10 % (ref 2–12)
NEUTROPHILS NFR BLD: 78 % (ref 43–80)
NEUTS SEG NFR BLD: 5.27 K/UL (ref 1.8–7.3)
PLATELET # BLD AUTO: 177 K/UL (ref 130–450)
PMV BLD AUTO: 10.3 FL (ref 7–12)
RBC # BLD AUTO: 4.13 M/UL (ref 3.5–5.5)
WBC OTHER # BLD: 6.8 K/UL (ref 4.5–11.5)

## 2025-02-17 PROCEDURE — 2500000003 HC RX 250 WO HCPCS: Performed by: INTERNAL MEDICINE

## 2025-02-17 PROCEDURE — 1200000000 HC SEMI PRIVATE

## 2025-02-17 PROCEDURE — 6370000000 HC RX 637 (ALT 250 FOR IP): Performed by: INTERNAL MEDICINE

## 2025-02-17 PROCEDURE — 99233 SBSQ HOSP IP/OBS HIGH 50: CPT | Performed by: INTERNAL MEDICINE

## 2025-02-17 PROCEDURE — 85025 COMPLETE CBC W/AUTO DIFF WBC: CPT

## 2025-02-17 PROCEDURE — 97165 OT EVAL LOW COMPLEX 30 MIN: CPT

## 2025-02-17 PROCEDURE — 97530 THERAPEUTIC ACTIVITIES: CPT

## 2025-02-17 PROCEDURE — 97535 SELF CARE MNGMENT TRAINING: CPT

## 2025-02-17 PROCEDURE — 97161 PT EVAL LOW COMPLEX 20 MIN: CPT | Performed by: PHYSICAL THERAPIST

## 2025-02-17 PROCEDURE — 97530 THERAPEUTIC ACTIVITIES: CPT | Performed by: PHYSICAL THERAPIST

## 2025-02-17 PROCEDURE — 6360000002 HC RX W HCPCS: Performed by: INTERNAL MEDICINE

## 2025-02-17 PROCEDURE — 99222 1ST HOSP IP/OBS MODERATE 55: CPT | Performed by: SURGERY

## 2025-02-17 RX ORDER — SULFAMETHOXAZOLE AND TRIMETHOPRIM 800; 160 MG/1; MG/1
1 TABLET ORAL 2 TIMES DAILY
Status: COMPLETED | OUTPATIENT
Start: 2025-02-17 | End: 2025-02-18

## 2025-02-17 RX ORDER — DIGOXIN 0.25 MG/ML
125 INJECTION INTRAMUSCULAR; INTRAVENOUS ONCE
Status: COMPLETED | OUTPATIENT
Start: 2025-02-17 | End: 2025-02-17

## 2025-02-17 RX ORDER — HYDRALAZINE HYDROCHLORIDE 25 MG/1
25 TABLET, FILM COATED ORAL 2 TIMES DAILY
Status: DISCONTINUED | OUTPATIENT
Start: 2025-02-17 | End: 2025-02-19 | Stop reason: HOSPADM

## 2025-02-17 RX ORDER — LOSARTAN POTASSIUM 50 MG/1
25 TABLET ORAL DAILY
Status: DISCONTINUED | OUTPATIENT
Start: 2025-02-17 | End: 2025-02-19 | Stop reason: HOSPADM

## 2025-02-17 RX ADMIN — HYDRALAZINE HYDROCHLORIDE 25 MG: 25 TABLET ORAL at 20:31

## 2025-02-17 RX ADMIN — Medication 10 ML: at 20:36

## 2025-02-17 RX ADMIN — RIVAROXABAN 15 MG: 15 TABLET, FILM COATED ORAL at 08:53

## 2025-02-17 RX ADMIN — DIGOXIN 125 MCG: 0.25 INJECTION INTRAMUSCULAR; INTRAVENOUS at 13:45

## 2025-02-17 RX ADMIN — Medication 10 ML: at 13:46

## 2025-02-17 RX ADMIN — SULFAMETHOXAZOLE AND TRIMETHOPRIM 1 TABLET: 800; 160 TABLET ORAL at 20:35

## 2025-02-17 RX ADMIN — LOSARTAN POTASSIUM 25 MG: 50 TABLET, FILM COATED ORAL at 13:45

## 2025-02-17 RX ADMIN — METOPROLOL SUCCINATE 25 MG: 25 TABLET, FILM COATED, EXTENDED RELEASE ORAL at 08:53

## 2025-02-17 RX ADMIN — SULFAMETHOXAZOLE AND TRIMETHOPRIM 1 TABLET: 800; 160 TABLET ORAL at 13:45

## 2025-02-17 ASSESSMENT — PAIN DESCRIPTION - DESCRIPTORS: DESCRIPTORS: SORE

## 2025-02-17 ASSESSMENT — PAIN DESCRIPTION - LOCATION: LOCATION: RIB CAGE

## 2025-02-17 ASSESSMENT — PAIN SCALES - GENERAL: PAINLEVEL_OUTOF10: 6

## 2025-02-17 NOTE — PROGRESS NOTES
Pharmacy Admission Medication Reconciliation      Allergies:  Iodine, Doxycycline, Other, Cefoxitin sodium in dextrose, Hycomine compound [pe-cpm-hydrocod-apap-caff], Iodine, and Mefoxin [cefoxitin]    Source of history: Patient     Preferred pharmacy: [facility]     Medication Changes Made to PTA List:  Medication Change/Update   Cetirizine  Not Taking    Lidocaine  Not Taking    Triamcinolone  Not Taking    Valsartan  Not Taking    Losartan  Added   Bactrim  Added   Hydralazine  Added   Preservision Areds  Added         Current Prior to Admission (PTA) Medications  Prior to Visit Medications    Medication Sig Taking? Authorizing Provider   losartan (COZAAR) 25 MG tablet Take 1 tablet by mouth daily Yes Provider, MD Destin   Multiple Vitamins-Minerals (PRESERVISION AREDS) CAPS Take 1 capsule by mouth 2 times daily Yes ProviderDestin MD   hydrALAZINE (APRESOLINE) 25 MG tablet Take 1 tablet by mouth 2 times daily Yes Provider, MD Destin   sulfamethoxazole-trimethoprim (BACTRIM DS;SEPTRA DS) 800-160 MG per tablet Take 1 tablet by mouth 2 times daily Yes Provider, MD Destin   metoprolol succinate (TOPROL XL) 25 MG extended release tablet Take 1 tablet by mouth daily Yes HrKilo matson, DO   digoxin (LANOXIN) 125 MCG tablet Take 1 tablet by mouth every 48 hours Yes Hric Kilo, DO   rivaroxaban (XARELTO) 15 MG TABS tablet Take 1 tablet by mouth daily (with breakfast) Yes Ciara Willams, DO   sulfamethoxazole-trimethoprim (BACTRIM DS;SEPTRA DS) 800-160 MG per tablet Take 1 tablet by mouth 2 times daily for 5 days  Peng Monae, DO   cetirizine (ZYRTEC) 5 MG tablet Take 1 tablet by mouth daily  Patient not taking: Reported on 2/16/2025  HricKilo DO   valsartan (DIOVAN) 40 MG tablet Take 1 tablet by mouth daily  Patient not taking: Reported on 2/16/2025  HricKilo DO   lidocaine 4 % external patch Place 1 patch onto the skin daily Apply patch to left shoulder.  Patch may remain in
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
## 2025-02-18 ENCOUNTER — APPOINTMENT (OUTPATIENT)
Dept: GENERAL RADIOLOGY | Age: 85
DRG: 309 | End: 2025-02-18
Payer: MEDICARE

## 2025-02-18 PROBLEM — N17.9 AKI (ACUTE KIDNEY INJURY): Status: ACTIVE | Noted: 2025-02-18

## 2025-02-18 LAB
ANION GAP SERPL CALCULATED.3IONS-SCNC: 10 MMOL/L (ref 7–16)
BASOPHILS # BLD: 0.03 K/UL (ref 0–0.2)
BASOPHILS NFR BLD: 0 % (ref 0–2)
BUN SERPL-MCNC: 42 MG/DL (ref 6–23)
CALCIUM SERPL-MCNC: 9.1 MG/DL (ref 8.6–10.2)
CHLORIDE SERPL-SCNC: 105 MMOL/L (ref 98–107)
CO2 SERPL-SCNC: 22 MMOL/L (ref 22–29)
CREAT SERPL-MCNC: 1.8 MG/DL (ref 0.5–1)
EKG ATRIAL RATE: 111 BPM
EKG Q-T INTERVAL: 326 MS
EKG QRS DURATION: 70 MS
EKG QTC CALCULATION (BAZETT): 447 MS
EKG R AXIS: -52 DEGREES
EKG T AXIS: 108 DEGREES
EKG VENTRICULAR RATE: 113 BPM
EOSINOPHIL # BLD: 0.22 K/UL (ref 0.05–0.5)
EOSINOPHILS RELATIVE PERCENT: 3 % (ref 0–6)
ERYTHROCYTE [DISTWIDTH] IN BLOOD BY AUTOMATED COUNT: 14.8 % (ref 11.5–15)
GFR, ESTIMATED: 28 ML/MIN/1.73M2
GLUCOSE SERPL-MCNC: 83 MG/DL (ref 74–99)
HCT VFR BLD AUTO: 44.1 % (ref 34–48)
HGB BLD-MCNC: 14.5 G/DL (ref 11.5–15.5)
IMM GRANULOCYTES # BLD AUTO: 0.04 K/UL (ref 0–0.58)
IMM GRANULOCYTES NFR BLD: 1 % (ref 0–5)
LYMPHOCYTES NFR BLD: 1.36 K/UL (ref 1.5–4)
LYMPHOCYTES RELATIVE PERCENT: 19 % (ref 20–42)
MCH RBC QN AUTO: 30.5 PG (ref 26–35)
MCHC RBC AUTO-ENTMCNC: 32.9 G/DL (ref 32–34.5)
MCV RBC AUTO: 92.8 FL (ref 80–99.9)
MONOCYTES NFR BLD: 0.71 K/UL (ref 0.1–0.95)
MONOCYTES NFR BLD: 10 % (ref 2–12)
NEUTROPHILS NFR BLD: 67 % (ref 43–80)
NEUTS SEG NFR BLD: 4.73 K/UL (ref 1.8–7.3)
PLATELET # BLD AUTO: 184 K/UL (ref 130–450)
PMV BLD AUTO: 9.9 FL (ref 7–12)
POTASSIUM SERPL-SCNC: 5 MMOL/L (ref 3.5–5)
RBC # BLD AUTO: 4.75 M/UL (ref 3.5–5.5)
SODIUM SERPL-SCNC: 137 MMOL/L (ref 132–146)
WBC OTHER # BLD: 7.1 K/UL (ref 4.5–11.5)

## 2025-02-18 PROCEDURE — 99231 SBSQ HOSP IP/OBS SF/LOW 25: CPT | Performed by: SURGERY

## 2025-02-18 PROCEDURE — 73560 X-RAY EXAM OF KNEE 1 OR 2: CPT

## 2025-02-18 PROCEDURE — 6370000000 HC RX 637 (ALT 250 FOR IP): Performed by: INTERNAL MEDICINE

## 2025-02-18 PROCEDURE — 1200000000 HC SEMI PRIVATE

## 2025-02-18 PROCEDURE — 80048 BASIC METABOLIC PNL TOTAL CA: CPT

## 2025-02-18 PROCEDURE — 93010 ELECTROCARDIOGRAM REPORT: CPT | Performed by: INTERNAL MEDICINE

## 2025-02-18 PROCEDURE — 2500000003 HC RX 250 WO HCPCS: Performed by: INTERNAL MEDICINE

## 2025-02-18 PROCEDURE — 85025 COMPLETE CBC W/AUTO DIFF WBC: CPT

## 2025-02-18 PROCEDURE — 6370000000 HC RX 637 (ALT 250 FOR IP): Performed by: STUDENT IN AN ORGANIZED HEALTH CARE EDUCATION/TRAINING PROGRAM

## 2025-02-18 PROCEDURE — 99232 SBSQ HOSP IP/OBS MODERATE 35: CPT | Performed by: INTERNAL MEDICINE

## 2025-02-18 RX ORDER — ACETAMINOPHEN 325 MG/1
650 TABLET ORAL EVERY 8 HOURS SCHEDULED
Status: DISCONTINUED | OUTPATIENT
Start: 2025-02-18 | End: 2025-02-19 | Stop reason: HOSPADM

## 2025-02-18 RX ORDER — METOPROLOL SUCCINATE 25 MG/1
25 TABLET, EXTENDED RELEASE ORAL 2 TIMES DAILY
Status: DISCONTINUED | OUTPATIENT
Start: 2025-02-18 | End: 2025-02-19 | Stop reason: HOSPADM

## 2025-02-18 RX ORDER — OXYCODONE HYDROCHLORIDE 5 MG/1
2.5 TABLET ORAL EVERY 4 HOURS PRN
Status: DISCONTINUED | OUTPATIENT
Start: 2025-02-18 | End: 2025-02-19 | Stop reason: HOSPADM

## 2025-02-18 RX ORDER — METHOCARBAMOL 500 MG/1
500 TABLET, FILM COATED ORAL 4 TIMES DAILY
Status: DISCONTINUED | OUTPATIENT
Start: 2025-02-18 | End: 2025-02-19 | Stop reason: HOSPADM

## 2025-02-18 RX ADMIN — SULFAMETHOXAZOLE AND TRIMETHOPRIM 1 TABLET: 800; 160 TABLET ORAL at 08:06

## 2025-02-18 RX ADMIN — METHOCARBAMOL 500 MG: 500 TABLET ORAL at 21:00

## 2025-02-18 RX ADMIN — ACETAMINOPHEN 650 MG: 325 TABLET ORAL at 21:00

## 2025-02-18 RX ADMIN — ACETAMINOPHEN 650 MG: 325 TABLET ORAL at 13:47

## 2025-02-18 RX ADMIN — SULFAMETHOXAZOLE AND TRIMETHOPRIM 1 TABLET: 800; 160 TABLET ORAL at 21:00

## 2025-02-18 RX ADMIN — RIVAROXABAN 15 MG: 15 TABLET, FILM COATED ORAL at 08:06

## 2025-02-18 RX ADMIN — Medication 10 ML: at 21:03

## 2025-02-18 RX ADMIN — DIGOXIN 125 MCG: 125 TABLET ORAL at 17:58

## 2025-02-18 RX ADMIN — METOPROLOL SUCCINATE 25 MG: 25 TABLET, FILM COATED, EXTENDED RELEASE ORAL at 21:00

## 2025-02-18 RX ADMIN — Medication 10 ML: at 08:07

## 2025-02-18 RX ADMIN — HYDRALAZINE HYDROCHLORIDE 25 MG: 25 TABLET ORAL at 08:06

## 2025-02-18 RX ADMIN — LOSARTAN POTASSIUM 25 MG: 50 TABLET, FILM COATED ORAL at 08:06

## 2025-02-18 RX ADMIN — METHOCARBAMOL 500 MG: 500 TABLET ORAL at 08:06

## 2025-02-18 RX ADMIN — HYDRALAZINE HYDROCHLORIDE 25 MG: 25 TABLET ORAL at 21:00

## 2025-02-18 RX ADMIN — METOPROLOL SUCCINATE 25 MG: 25 TABLET, FILM COATED, EXTENDED RELEASE ORAL at 08:06

## 2025-02-18 RX ADMIN — ACETAMINOPHEN 650 MG: 325 TABLET ORAL at 01:27

## 2025-02-18 ASSESSMENT — PAIN SCALES - WONG BAKER: WONGBAKER_NUMERICALRESPONSE: NO HURT

## 2025-02-18 ASSESSMENT — PAIN SCALES - GENERAL
PAINLEVEL_OUTOF10: 6
PAINLEVEL_OUTOF10: 0
PAINLEVEL_OUTOF10: 7
PAINLEVEL_OUTOF10: 0

## 2025-02-18 ASSESSMENT — LIFESTYLE VARIABLES
HOW MANY STANDARD DRINKS CONTAINING ALCOHOL DO YOU HAVE ON A TYPICAL DAY: PATIENT DOES NOT DRINK
HOW OFTEN DO YOU HAVE A DRINK CONTAINING ALCOHOL: NEVER

## 2025-02-18 ASSESSMENT — PAIN DESCRIPTION - ORIENTATION: ORIENTATION: LEFT

## 2025-02-18 ASSESSMENT — PAIN DESCRIPTION - LOCATION
LOCATION: GENERALIZED
LOCATION: GENERALIZED
LOCATION: RIB CAGE

## 2025-02-18 ASSESSMENT — PAIN DESCRIPTION - DESCRIPTORS
DESCRIPTORS: ACHING
DESCRIPTORS: ACHING;DISCOMFORT
DESCRIPTORS: ACHING
DESCRIPTORS: ACHING
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
## 2025-02-18 NOTE — PLAN OF CARE
Problem: Chronic Conditions and Co-morbidities  Goal: Patient's chronic conditions and co-morbidity symptoms are monitored and maintained or improved  2/18/2025 1000 by Olga Charles RN  Outcome: Progressing  Flowsheets (Taken 2/18/2025 0953)  Care Plan - Patient's Chronic Conditions and Co-Morbidity Symptoms are Monitored and Maintained or Improved: Monitor and assess patient's chronic conditions and comorbid symptoms for stability, deterioration, or improvement  2/17/2025 2241 by Eufemia Downing RN  Outcome: Progressing     Problem: Discharge Planning  Goal: Discharge to home or other facility with appropriate resources  2/18/2025 1000 by Olga Charles RN  Outcome: Progressing  Flowsheets (Taken 2/18/2025 0953)  Discharge to home or other facility with appropriate resources:   Identify barriers to discharge with patient and caregiver   Arrange for needed discharge resources and transportation as appropriate   Identify discharge learning needs (meds, wound care, etc)  2/17/2025 2241 by Eufemia Downing RN  Outcome: Progressing     Problem: Pain  Goal: Verbalizes/displays adequate comfort level or baseline comfort level  2/18/2025 1000 by Olga Charles RN  Outcome: Progressing  2/17/2025 2241 by Eufemia Downing RN  Outcome: Progressing     Problem: ABCDS Injury Assessment  Goal: Absence of physical injury  2/18/2025 1000 by Olga Charles RN  Outcome: Progressing  2/17/2025 2241 by Eufemia Downing RN  Outcome: Progressing     Problem: Safety - Adult  Goal: Free from fall injury  2/18/2025 1000 by Olga Charles RN  Outcome: Progressing  2/17/2025 2241 by Eufemia Downing RN  Outcome: Progressing

## 2025-02-18 NOTE — PROGRESS NOTES
Spiritual Health History and Assessment/Progress Note  [facility]    Initial Encounter, Spiritual/Emotional Needs, Rituals, Rites and Sacraments,  Encounter (Fr. Juarez),  ,  ,      Name: Caitlyn Mitchell MRN: [id number]    Age: 84 y.o.     Sex: female   Language: English   Taoism: Baptism   Atrial fibrillation with rapid ventricular response (HCC)     Date: 2/18/2025                           Spiritual Assessment began in [facility] 6S IMCU        Referral/Consult From: Rounding   Encounter Overview/Reason: Initial Encounter, Spiritual/Emotional Needs, Rituals, Rites and Sacraments,  Encounter (Fr. Juarez)  Service Provided For: Patient    Shanika, Belief, Meaning:   Patient unable to assess at this time  Family/Friends No family/friends present      Importance and Influence:  Patient unable to assess at this time  Family/Friends No family/friends present    Community:  Patient Other: unknown  Family/Friends No family/friends present    Assessment and Plan of Care:     Patient Interventions include: Facilitated expression of thoughts and feelings and Provided sacramental/Gnosticism ritual  Family/Friends Interventions include: No family/friends present    Patient Plan of Care: No spiritual needs identified for follow-up and Spiritual Care available upon further referral  Family/Friends Plan of Care: No family/friends present    Electronically signed by Chaplain Karrie on 2/18/2025 at 2:13 PM

## 2025-02-18 NOTE — PLAN OF CARE
Problem: Chronic Conditions and Co-morbidities  Goal: Patient's chronic conditions and co-morbidity symptoms are monitored and maintained or improved  2/17/2025 2241 by Eufemia Downing RN  Outcome: Progressing  2/17/2025 1409 by Olga Charles RN  Outcome: Progressing     Problem: Discharge Planning  Goal: Discharge to home or other facility with appropriate resources  2/17/2025 2241 by Eufemia Downing RN  Outcome: Progressing  2/17/2025 1409 by Olga Charles RN  Outcome: Progressing     Problem: Pain  Goal: Verbalizes/displays adequate comfort level or baseline comfort level  2/17/2025 2241 by Eufemia Downing RN  Outcome: Progressing  2/17/2025 1409 by Olga Charles RN  Outcome: Progressing     Problem: ABCDS Injury Assessment  Goal: Absence of physical injury  2/17/2025 2241 by Eufemia Downing RN  Outcome: Progressing  2/17/2025 1409 by Olga Charles RN  Outcome: Progressing     Problem: Safety - Adult  Goal: Free from fall injury  2/17/2025 2241 by Eufemia Downing RN  Outcome: Progressing  2/17/2025 1409 by Olga Charles RN  Outcome: Progressing
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
## 2025-02-19 VITALS
HEART RATE: 107 BPM | BODY MASS INDEX: 27.25 KG/M2 | TEMPERATURE: 98.6 F | WEIGHT: 135 LBS | OXYGEN SATURATION: 94 % | SYSTOLIC BLOOD PRESSURE: 130 MMHG | RESPIRATION RATE: 20 BRPM | DIASTOLIC BLOOD PRESSURE: 75 MMHG

## 2025-02-19 LAB
ANION GAP SERPL CALCULATED.3IONS-SCNC: 10 MMOL/L (ref 7–16)
BUN SERPL-MCNC: 40 MG/DL (ref 6–23)
CALCIUM SERPL-MCNC: 9 MG/DL (ref 8.6–10.2)
CHLORIDE SERPL-SCNC: 103 MMOL/L (ref 98–107)
CO2 SERPL-SCNC: 22 MMOL/L (ref 22–29)
CREAT SERPL-MCNC: 1.6 MG/DL (ref 0.5–1)
GFR, ESTIMATED: 31 ML/MIN/1.73M2
GLUCOSE SERPL-MCNC: 87 MG/DL (ref 74–99)
POTASSIUM SERPL-SCNC: 5 MMOL/L (ref 3.5–5)
SODIUM SERPL-SCNC: 135 MMOL/L (ref 132–146)

## 2025-02-19 PROCEDURE — 80048 BASIC METABOLIC PNL TOTAL CA: CPT

## 2025-02-19 PROCEDURE — 6370000000 HC RX 637 (ALT 250 FOR IP): Performed by: INTERNAL MEDICINE

## 2025-02-19 PROCEDURE — 97530 THERAPEUTIC ACTIVITIES: CPT

## 2025-02-19 PROCEDURE — 97116 GAIT TRAINING THERAPY: CPT

## 2025-02-19 PROCEDURE — 6370000000 HC RX 637 (ALT 250 FOR IP): Performed by: STUDENT IN AN ORGANIZED HEALTH CARE EDUCATION/TRAINING PROGRAM

## 2025-02-19 PROCEDURE — 2500000003 HC RX 250 WO HCPCS: Performed by: INTERNAL MEDICINE

## 2025-02-19 PROCEDURE — 99239 HOSP IP/OBS DSCHRG MGMT >30: CPT | Performed by: INTERNAL MEDICINE

## 2025-02-19 RX ORDER — METOPROLOL SUCCINATE 25 MG/1
25 TABLET, EXTENDED RELEASE ORAL 2 TIMES DAILY
Qty: 60 TABLET | Refills: 0 | Status: SHIPPED | OUTPATIENT
Start: 2025-02-19 | End: 2025-03-21

## 2025-02-19 RX ADMIN — HYDRALAZINE HYDROCHLORIDE 25 MG: 25 TABLET ORAL at 08:39

## 2025-02-19 RX ADMIN — METHOCARBAMOL 500 MG: 500 TABLET ORAL at 08:39

## 2025-02-19 RX ADMIN — METOPROLOL SUCCINATE 25 MG: 25 TABLET, FILM COATED, EXTENDED RELEASE ORAL at 08:39

## 2025-02-19 RX ADMIN — RIVAROXABAN 15 MG: 15 TABLET, FILM COATED ORAL at 08:39

## 2025-02-19 RX ADMIN — ACETAMINOPHEN 650 MG: 325 TABLET ORAL at 06:18

## 2025-02-19 RX ADMIN — Medication 10 ML: at 08:39

## 2025-02-19 ASSESSMENT — PAIN DESCRIPTION - LOCATION
LOCATION: GENERALIZED
LOCATION: GENERALIZED;RIB CAGE

## 2025-02-19 ASSESSMENT — PAIN DESCRIPTION - DESCRIPTORS
DESCRIPTORS: ACHING
DESCRIPTORS: ACHING

## 2025-02-19 ASSESSMENT — PAIN SCALES - GENERAL
PAINLEVEL_OUTOF10: 5
PAINLEVEL_OUTOF10: 3
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
## 2025-02-19 NOTE — PLAN OF CARE
Problem: Chronic Conditions and Co-morbidities  Goal: Patient's chronic conditions and co-morbidity symptoms are monitored and maintained or improved  2/19/2025 0759 by Olga Charles RN  Outcome: Progressing  2/18/2025 2244 by Eufemia Downing RN  Outcome: Progressing     Problem: Discharge Planning  Goal: Discharge to home or other facility with appropriate resources  2/19/2025 0759 by Olga Charles RN  Outcome: Progressing  2/18/2025 2244 by Eufemia Downing RN  Outcome: Progressing     Problem: Pain  Goal: Verbalizes/displays adequate comfort level or baseline comfort level  2/19/2025 0759 by Olga Charles RN  Outcome: Progressing  2/18/2025 2244 by Eufemia Downing RN  Outcome: Progressing     Problem: ABCDS Injury Assessment  Goal: Absence of physical injury  2/19/2025 0759 by Olga Charles RN  Outcome: Progressing  2/18/2025 2244 by Eufemia Downing RN  Outcome: Progressing     Problem: Safety - Adult  Goal: Free from fall injury  2/19/2025 0759 by Olga Charles RN  Outcome: Progressing  2/18/2025 2244 by Eufemia Downing RN  Outcome: Progressing

## 2025-02-19 NOTE — PROGRESS NOTES
Spiritual Health History and Assessment/Progress Note  [facility]     Encounter, Rituals, Rites and Sacraments,  ,  ,      Name: Caitlyn Mitchell MRN: [id number]    Age: 84 y.o.     Sex: female   Language: English   Jainism: Yarsanism   Atrial fibrillation with rapid ventricular response (HCC)     Date: 2/19/2025                           Spiritual Assessment began in [facility] 6S IMCU        Referral/Consult From: Rounding   Encounter Overview/Reason:  Encounter, Rituals, Rites and Sacraments  Service Provided For: Patient    Shanika, Belief, Meaning:   Patient is connected with a shanika tradition or spiritual practice  Family/Friends No family/friends present      Importance and Influence:  Patient has no beliefs influential to healthcare decision-making identified during this visit  Family/Friends No family/friends present    Community:  Patient feels well-supported. Support system includes: Friends  Family/Friends No family/friends present    Assessment and Plan of Care:     Patient Interventions include: Affirmed coping skills/support systems and Provided sacramental/Hoahaoism ritual  Family/Friends Interventions include: No family/friends present    Patient Plan of Care: Spiritual Care available upon further referral  Family/Friends Plan of Care: Spiritual Care available upon further referral    Electronically signed by Chaplain Denise on 2/19/2025 at 1:30 PM
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
## 2025-02-19 NOTE — PLAN OF CARE
Problem: Chronic Conditions and Co-morbidities  Goal: Patient's chronic conditions and co-morbidity symptoms are monitored and maintained or improved  2/18/2025 2244 by Eufemia Downing RN  Outcome: Progressing  2/18/2025 1000 by Olga Charles RN  Outcome: Progressing  Flowsheets (Taken 2/18/2025 0953)  Care Plan - Patient's Chronic Conditions and Co-Morbidity Symptoms are Monitored and Maintained or Improved: Monitor and assess patient's chronic conditions and comorbid symptoms for stability, deterioration, or improvement     Problem: Discharge Planning  Goal: Discharge to home or other facility with appropriate resources  2/18/2025 2244 by Eufemia Downing RN  Outcome: Progressing  2/18/2025 1000 by Olga Charles RN  Outcome: Progressing  Flowsheets (Taken 2/18/2025 0953)  Discharge to home or other facility with appropriate resources:   Identify barriers to discharge with patient and caregiver   Arrange for needed discharge resources and transportation as appropriate   Identify discharge learning needs (meds, wound care, etc)     Problem: Pain  Goal: Verbalizes/displays adequate comfort level or baseline comfort level  2/18/2025 2244 by Eufemia Downing RN  Outcome: Progressing  2/18/2025 1000 by Olga Charles RN  Outcome: Progressing     Problem: ABCDS Injury Assessment  Goal: Absence of physical injury  2/18/2025 2244 by Eufemia Downing RN  Outcome: Progressing  2/18/2025 1000 by Olga Charles RN  Outcome: Progressing     Problem: Safety - Adult  Goal: Free from fall injury  2/18/2025 2244 by Eufemia Downing RN  Outcome: Progressing  2/18/2025 1000 by Olga Charles RN  Outcome: Progressing
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
## 2025-02-21 ENCOUNTER — TELEPHONE (OUTPATIENT)
Dept: ENT CLINIC | Age: 85
End: 2025-02-21

## 2025-02-21 NOTE — TELEPHONE ENCOUNTER
New referral, thyroid nodule seen on CT while at hospital. Please review and advise on new patient scheduling

## 2025-02-24 ENCOUNTER — OFFICE VISIT (OUTPATIENT)
Dept: FAMILY MEDICINE CLINIC | Age: 85
End: 2025-02-24
Payer: MEDICARE

## 2025-02-24 VITALS
WEIGHT: 135 LBS | DIASTOLIC BLOOD PRESSURE: 85 MMHG | BODY MASS INDEX: 27.21 KG/M2 | RESPIRATION RATE: 18 BRPM | HEART RATE: 106 BPM | HEIGHT: 59 IN | TEMPERATURE: 97.7 F | OXYGEN SATURATION: 96 % | SYSTOLIC BLOOD PRESSURE: 134 MMHG

## 2025-02-24 DIAGNOSIS — L08.9 WOUND INFECTION: ICD-10-CM

## 2025-02-24 DIAGNOSIS — S69.92XA HAND INJURY, LEFT, INITIAL ENCOUNTER: ICD-10-CM

## 2025-02-24 DIAGNOSIS — T14.8XXA WOUND INFECTION: ICD-10-CM

## 2025-02-24 DIAGNOSIS — R31.0 GROSS HEMATURIA: Primary | ICD-10-CM

## 2025-02-24 LAB
APPEARANCE FLUID: ABNORMAL
BILIRUBIN, POC: NEGATIVE
BLOOD URINE, POC: ABNORMAL
CLARITY, POC: ABNORMAL
COLOR, POC: YELLOW
GLUCOSE URINE, POC: NEGATIVE MG/DL
KETONES, POC: NEGATIVE MG/DL
LEUKOCYTE EST, POC: NEGATIVE
NITRITE, POC: NEGATIVE
PH, POC: 5.5
PROTEIN, POC: NEGATIVE MG/DL
SPECIFIC GRAVITY, POC: 1.02
UROBILINOGEN, POC: 0.2 MG/DL

## 2025-02-24 PROCEDURE — G8427 DOCREV CUR MEDS BY ELIG CLIN: HCPCS

## 2025-02-24 PROCEDURE — 99214 OFFICE O/P EST MOD 30 MIN: CPT

## 2025-02-24 PROCEDURE — 1160F RVW MEDS BY RX/DR IN RCRD: CPT

## 2025-02-24 PROCEDURE — 1036F TOBACCO NON-USER: CPT

## 2025-02-24 PROCEDURE — 1090F PRES/ABSN URINE INCON ASSESS: CPT

## 2025-02-24 PROCEDURE — 1159F MED LIST DOCD IN RCRD: CPT

## 2025-02-24 PROCEDURE — 1111F DSCHRG MED/CURRENT MED MERGE: CPT

## 2025-02-24 PROCEDURE — 81002 URINALYSIS NONAUTO W/O SCOPE: CPT

## 2025-02-24 PROCEDURE — 1124F ACP DISCUSS-NO DSCNMKR DOCD: CPT

## 2025-02-24 PROCEDURE — G8417 CALC BMI ABV UP PARAM F/U: HCPCS

## 2025-02-24 RX ORDER — CLINDAMYCIN HYDROCHLORIDE 300 MG/1
300 CAPSULE ORAL 4 TIMES DAILY
Qty: 28 CAPSULE | Refills: 0 | Status: SHIPPED | OUTPATIENT
Start: 2025-02-24 | End: 2025-03-03

## 2025-02-24 ASSESSMENT — ENCOUNTER SYMPTOMS
APNEA: 0
NAUSEA: 0
VOMITING: 0
DIARRHEA: 0
ABDOMINAL PAIN: 0
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
## 2025-02-26 LAB
CULTURE: NORMAL
SPECIMEN DESCRIPTION: NORMAL

## 2025-02-28 ENCOUNTER — TELEPHONE (OUTPATIENT)
Dept: AUDIOLOGY | Age: 85
End: 2025-02-28

## 2025-02-28 NOTE — TELEPHONE ENCOUNTER
Patient called to confirm appointment for 3/6 at 2 PM.     Electronically signed by Janett Curry on 2/28/2025 at 8:38 AM

## 2025-03-04 ENCOUNTER — TELEPHONE (OUTPATIENT)
Dept: AUDIOLOGY | Age: 85
End: 2025-03-04

## 2025-03-04 NOTE — TELEPHONE ENCOUNTER
PATIENT LEFT VOICE MAIL TO CONFIRM APPOINTMENT FOR 3/6/25.    RETURNED CALL AND SPOKE TO PATIENT.    Lowell Horner CCC/A  Audiologist  [id number]  NPI#:  [id number]

## 2025-03-06 ENCOUNTER — PROCEDURE VISIT (OUTPATIENT)
Dept: AUDIOLOGY | Age: 85
End: 2025-03-06

## 2025-03-06 DIAGNOSIS — H90.3 SENSORINEURAL HEARING LOSS, BILATERAL: Primary | ICD-10-CM

## 2025-03-06 PROCEDURE — 99024 POSTOP FOLLOW-UP VISIT: CPT | Performed by: AUDIOLOGIST

## 2025-03-06 NOTE — PROGRESS NOTES
Hearing aids cleaned/checked.    No issues noted.    Lowell Horner CCC/A  Audiologist  [id number]  NPI#:  [id number]

## 2025-03-26 ENCOUNTER — PROCEDURE VISIT (OUTPATIENT)
Dept: AUDIOLOGY | Age: 85
End: 2025-03-26

## 2025-03-26 ENCOUNTER — OFFICE VISIT (OUTPATIENT)
Dept: FAMILY MEDICINE CLINIC | Age: 85
End: 2025-03-26
Payer: MEDICARE

## 2025-03-26 VITALS
SYSTOLIC BLOOD PRESSURE: 126 MMHG | RESPIRATION RATE: 19 BRPM | HEART RATE: 87 BPM | TEMPERATURE: 97.4 F | BODY MASS INDEX: 28.63 KG/M2 | WEIGHT: 142 LBS | HEIGHT: 59 IN | OXYGEN SATURATION: 98 % | DIASTOLIC BLOOD PRESSURE: 82 MMHG

## 2025-03-26 DIAGNOSIS — H90.3 SENSORINEURAL HEARING LOSS, BILATERAL: Primary | ICD-10-CM

## 2025-03-26 DIAGNOSIS — R39.9 UTI SYMPTOMS: Primary | ICD-10-CM

## 2025-03-26 DIAGNOSIS — R35.0 URINARY FREQUENCY: ICD-10-CM

## 2025-03-26 LAB
BILIRUBIN, POC: ABNORMAL
BLOOD URINE, POC: ABNORMAL
CLARITY, POC: ABNORMAL
COLOR, POC: ABNORMAL
GLUCOSE URINE, POC: ABNORMAL MG/DL
KETONES, POC: ABNORMAL MG/DL
LEUKOCYTE EST, POC: ABNORMAL
NITRITE, POC: ABNORMAL
PH, POC: 5.5
PROTEIN, POC: ABNORMAL MG/DL
SPECIFIC GRAVITY, POC: 1.01
UROBILINOGEN, POC: 0.2 MG/DL

## 2025-03-26 PROCEDURE — 1160F RVW MEDS BY RX/DR IN RCRD: CPT

## 2025-03-26 PROCEDURE — 99024 POSTOP FOLLOW-UP VISIT: CPT | Performed by: AUDIOLOGIST

## 2025-03-26 PROCEDURE — 1124F ACP DISCUSS-NO DSCNMKR DOCD: CPT

## 2025-03-26 PROCEDURE — 81002 URINALYSIS NONAUTO W/O SCOPE: CPT

## 2025-03-26 PROCEDURE — 99213 OFFICE O/P EST LOW 20 MIN: CPT

## 2025-03-26 PROCEDURE — 1159F MED LIST DOCD IN RCRD: CPT

## 2025-03-26 PROCEDURE — 1036F TOBACCO NON-USER: CPT

## 2025-03-26 PROCEDURE — G8427 DOCREV CUR MEDS BY ELIG CLIN: HCPCS

## 2025-03-26 PROCEDURE — G8417 CALC BMI ABV UP PARAM F/U: HCPCS

## 2025-03-26 PROCEDURE — 1090F PRES/ABSN URINE INCON ASSESS: CPT

## 2025-03-26 RX ORDER — CEPHALEXIN 500 MG/1
500 CAPSULE ORAL 2 TIMES DAILY
Qty: 10 CAPSULE | Refills: 0 | Status: SHIPPED | OUTPATIENT
Start: 2025-03-26 | End: 2025-03-31

## 2025-03-26 ASSESSMENT — PATIENT HEALTH QUESTIONNAIRE - PHQ9
SUM OF ALL RESPONSES TO PHQ QUESTIONS 1-9: 0
1. LITTLE INTEREST OR PLEASURE IN DOING THINGS: NOT AT ALL
SUM OF ALL RESPONSES TO PHQ QUESTIONS 1-9: 0
2. FEELING DOWN, DEPRESSED OR HOPELESS: NOT AT ALL
SUM OF ALL RESPONSES TO PHQ QUESTIONS 1-9: 0
SUM OF ALL RESPONSES TO PHQ QUESTIONS 1-9: 0
DEPRESSION UNABLE TO ASSESS: FUNCTIONAL CAPACITY MOTIVATION LIMITS ACCURACY

## 2025-03-26 NOTE — PROGRESS NOTES
Chief Complaint       Urinary Tract Infection (Frequency, urgency, pressure)      History of Present Illness   Source of history provided by:  patient.     Caitlyn Mitchell is a 84 y.o. old female presenting to the walk in clinic for evaluation of urinary frequency x 2 days. Reports associated urgency, and suprapubic pressure. Denies gross hematuria.  Denies associated flank pain. Denies any fever,vomiting, diarrhea, or lethargy. Denies any vaginal discharge, vaginal bleeding. No LMP recorded (lmp unknown). Patient has had a hysterectomy.      ROS    Unless otherwise stated in this report or unable to obtain because of the patient's clinical or mental status as evidenced by the medical record, this patients's positive and negative responses for Review of Systems, constitutional, psych, eyes, ENT, cardiovascular, respiratory, gastrointestinal, neurological, genitourinary, musculoskeletal, integument systems and systems related to the presenting problem are either stated in the preceding or were not pertinent or were negative for the symptoms and/or complaints related to the medical problem.    Physical Exam         VS:  /82   Pulse 87   Temp 97.4 °F (36.3 °C)   Resp 19   Ht 1.495 m (4' 10.86\")   Wt 64.4 kg (142 lb)   LMP  (LMP Unknown)   SpO2 98%   BMI 28.82 kg/m²    Oxygen Saturation Interpretation: Normal.    Constitutional:  A&Ox3, development consistent with age, NAD.  Lungs:  CTAB without wheezing, rales, or rhonchi.  Heart:  RRR without pathologic murmurs, rubs, or gallops.  Abdomen: Soft, nondistended, with mild suprapubic tenderness. No rebound, rigidity, or guarding. BS+ X4. No organomegaly.     Back: No CVA tenderness.  Skin:  Normal turgor.  Warm, dry, without visible rash, unless noted elsewhere.  Neurological:  Alert and oriented.  Motor functions intact.  Responds to verbal commands.    Lab / Imaging Results   (All laboratory and radiology results have been personally reviewed by

## 2025-03-26 NOTE — PROGRESS NOTES
Patient presented to department, without appointment.    Patient seen for hearing aid cleaning.    No other issues noted.    Lowell Horner CCC/A  Audiologist  [id number]  NPI#:  [id number]

## 2025-03-27 ENCOUNTER — RESULTS FOLLOW-UP (OUTPATIENT)
Dept: FAMILY MEDICINE CLINIC | Age: 85
End: 2025-03-27

## 2025-03-27 LAB
CULTURE: NO GROWTH
SPECIMEN DESCRIPTION: NORMAL

## 2025-04-22 ENCOUNTER — HOSPITAL ENCOUNTER (EMERGENCY)
Age: 85
Discharge: HOME | End: 2025-04-22
Payer: MEDICARE

## 2025-04-22 VITALS
HEART RATE: 89 BPM | RESPIRATION RATE: 18 BRPM | OXYGEN SATURATION: 98 % | SYSTOLIC BLOOD PRESSURE: 176 MMHG | DIASTOLIC BLOOD PRESSURE: 90 MMHG | TEMPERATURE: 98.7 F

## 2025-04-22 DIAGNOSIS — N30.01 ACUTE CYSTITIS WITH HEMATURIA: Primary | ICD-10-CM

## 2025-04-22 LAB
BACTERIA URNS QL MICRO: ABNORMAL
BILIRUB UR QL STRIP: NEGATIVE
CLARITY UR: ABNORMAL
COLOR UR: YELLOW
GLUCOSE UR STRIP-MCNC: NEGATIVE MG/DL
HGB UR QL STRIP.AUTO: ABNORMAL
KETONES UR STRIP-MCNC: NEGATIVE MG/DL
LEUKOCYTE ESTERASE UR QL STRIP: NEGATIVE
NITRITE UR QL STRIP: NEGATIVE
PH UR STRIP: 5.5 [PH] (ref 5–8)
PROT UR STRIP-MCNC: 30 MG/DL
RBC #/AREA URNS HPF: ABNORMAL /HPF
SP GR UR STRIP: 1.02 (ref 1–1.03)
UROBILINOGEN UR STRIP-ACNC: 0.2 EU/DL (ref 0–1)
WBC #/AREA URNS HPF: ABNORMAL /HPF

## 2025-04-22 PROCEDURE — 99211 OFF/OP EST MAY X REQ PHY/QHP: CPT

## 2025-04-22 PROCEDURE — 81001 URINALYSIS AUTO W/SCOPE: CPT

## 2025-04-22 PROCEDURE — 87088 URINE BACTERIA CULTURE: CPT

## 2025-04-22 PROCEDURE — 87086 URINE CULTURE/COLONY COUNT: CPT

## 2025-04-22 RX ORDER — CEPHALEXIN 500 MG/1
500 CAPSULE ORAL 2 TIMES DAILY
Qty: 14 CAPSULE | Refills: 0 | Status: SHIPPED | OUTPATIENT
Start: 2025-04-22 | End: 2025-04-22

## 2025-04-22 RX ORDER — CEPHALEXIN 250 MG/5ML
500 POWDER, FOR SUSPENSION ORAL 2 TIMES DAILY
Qty: 140 ML | Refills: 0 | Status: SHIPPED | OUTPATIENT
Start: 2025-04-22 | End: 2025-04-29

## 2025-04-22 ASSESSMENT — PAIN - FUNCTIONAL ASSESSMENT: PAIN_FUNCTIONAL_ASSESSMENT: 0-10

## 2025-04-22 ASSESSMENT — PAIN SCALES - GENERAL: PAINLEVEL_OUTOF10: 0

## 2025-04-22 NOTE — ED PROVIDER NOTES
[facility] URGENT CARE  EMERGENCY DEPARTMENT ENCOUNTER        NAME: Caitlyn Mitchell  :  1940  MRN:  [id number]  Date of evaluation: 2025  Provider: Willie Teresa PA-C  PCP: Miguel Coleman MD  Note Started : 2:14 PM EDT 25    Chief Complaint: Urinary Tract Infection      This is a 85-year-old female who presents to urgent care complaining of urinary frequency and urgency for this past week.  No fevers or chills.  No nausea or vomiting.  No back pain.  On first contact patient she appears to be in no acute distress        Review of Systems  Pertinent positives and negatives are stated within HPI, all other systems reviewed and are negative.     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    Past Surgical History:  has a past surgical history that includes Cholecystectomy (N/A); Hysterectomy (N/A); skin biopsy; other surgical history (2015); Lithotripsy (Left, 2015); Total hip arthroplasty (Right, 2014); and Total hip arthroplasty (Left, 2016).    Social History:  reports that she has never smoked. She has never used smokeless tobacco. She reports current alcohol use. She reports that she does not use drugs.    Family History: family history includes Heart Disease in her father; Kidney Disease in her mother.     The patient’s home medications have been reviewed.    The nursing notes within the ED encounter have been reviewed.

## 2025-04-24 ENCOUNTER — RESULTS FOLLOW-UP (OUTPATIENT)
Dept: PHARMACY | Age: 85
End: 2025-04-24

## 2025-04-24 ENCOUNTER — PROCEDURE VISIT (OUTPATIENT)
Dept: AUDIOLOGY | Age: 85
End: 2025-04-24

## 2025-04-24 DIAGNOSIS — H90.3 SENSORINEURAL HEARING LOSS, BILATERAL: Primary | ICD-10-CM

## 2025-04-24 LAB
MICROORGANISM SPEC CULT: ABNORMAL
SPECIMEN DESCRIPTION: ABNORMAL

## 2025-04-24 PROCEDURE — 99024 POSTOP FOLLOW-UP VISIT: CPT | Performed by: AUDIOLOGIST

## 2025-04-24 NOTE — PROGRESS NOTES
Hearing aids cleaned and checked.    Lowell Horner CCC/A  Audiologist  [id number]  NPI#:  [id number]

## 2025-04-25 RX ORDER — AMOXICILLIN 500 MG/1
500 CAPSULE ORAL 2 TIMES DAILY
Qty: 14 CAPSULE | Refills: 0 | Status: SHIPPED | OUTPATIENT
Start: 2025-04-25 | End: 2025-05-02

## 2025-05-13 ENCOUNTER — OFFICE VISIT (OUTPATIENT)
Dept: FAMILY MEDICINE CLINIC | Age: 85
End: 2025-05-13
Payer: MEDICARE

## 2025-05-13 VITALS
DIASTOLIC BLOOD PRESSURE: 88 MMHG | BODY MASS INDEX: 28.82 KG/M2 | OXYGEN SATURATION: 97 % | WEIGHT: 142 LBS | TEMPERATURE: 97.4 F | SYSTOLIC BLOOD PRESSURE: 130 MMHG | HEART RATE: 99 BPM | RESPIRATION RATE: 19 BRPM

## 2025-05-13 DIAGNOSIS — R31.0 GROSS HEMATURIA: ICD-10-CM

## 2025-05-13 DIAGNOSIS — R39.9 UTI SYMPTOMS: Primary | ICD-10-CM

## 2025-05-13 DIAGNOSIS — C57.9: ICD-10-CM

## 2025-05-13 DIAGNOSIS — N30.91 HEMORRHAGIC CYSTITIS: ICD-10-CM

## 2025-05-13 LAB
BILIRUBIN, POC: NORMAL
BLOOD URINE, POC: NORMAL
CLARITY, POC: NORMAL
COLOR, POC: NORMAL
GLUCOSE URINE, POC: NORMAL MG/DL
KETONES, POC: NORMAL MG/DL
LEUKOCYTE EST, POC: NORMAL
NITRITE, POC: POSITIVE
PH, POC: 5
PROTEIN, POC: POSITIVE MG/DL
SPECIFIC GRAVITY, POC: 1.01
UROBILINOGEN, POC: 0.1 MG/DL

## 2025-05-13 PROCEDURE — 1124F ACP DISCUSS-NO DSCNMKR DOCD: CPT | Performed by: EMERGENCY MEDICINE

## 2025-05-13 PROCEDURE — G8427 DOCREV CUR MEDS BY ELIG CLIN: HCPCS | Performed by: EMERGENCY MEDICINE

## 2025-05-13 PROCEDURE — 1159F MED LIST DOCD IN RCRD: CPT | Performed by: EMERGENCY MEDICINE

## 2025-05-13 PROCEDURE — 1160F RVW MEDS BY RX/DR IN RCRD: CPT | Performed by: EMERGENCY MEDICINE

## 2025-05-13 PROCEDURE — G8417 CALC BMI ABV UP PARAM F/U: HCPCS | Performed by: EMERGENCY MEDICINE

## 2025-05-13 PROCEDURE — 1090F PRES/ABSN URINE INCON ASSESS: CPT | Performed by: EMERGENCY MEDICINE

## 2025-05-13 PROCEDURE — 1036F TOBACCO NON-USER: CPT | Performed by: EMERGENCY MEDICINE

## 2025-05-13 PROCEDURE — 99214 OFFICE O/P EST MOD 30 MIN: CPT | Performed by: EMERGENCY MEDICINE

## 2025-05-13 PROCEDURE — 81002 URINALYSIS NONAUTO W/O SCOPE: CPT | Performed by: EMERGENCY MEDICINE

## 2025-05-13 RX ORDER — CEFDINIR 300 MG/1
300 CAPSULE ORAL DAILY
Qty: 7 CAPSULE | Refills: 0 | Status: SHIPPED | OUTPATIENT
Start: 2025-05-13 | End: 2025-05-20

## 2025-05-13 ASSESSMENT — ENCOUNTER SYMPTOMS
NAUSEA: 0
WHEEZING: 0
BACK PAIN: 0
VOMITING: 0
EYE DISCHARGE: 0
DIARRHEA: 0
EYE REDNESS: 0
SHORTNESS OF BREATH: 0
EYE PAIN: 0
SORE THROAT: 0
COUGH: 0
ABDOMINAL DISTENTION: 0
SINUS PRESSURE: 0

## 2025-05-13 NOTE — PROGRESS NOTES
Chief Complaint:   Urinary Tract Infection (Going urinate and going out the other end as well but not diarrhea, pressure)      History of Present Illness   HPI:  Caitlyn Mitchell is a 85 y.o. female who presents to [facility] for gross hematuria, bladder pressure, hx of genitourinay CA remote past    Prior to Visit Medications    Medication Sig Taking? Authorizing Provider   cefdinir (OMNICEF) 300 MG capsule Take 1 capsule by mouth daily for 7 days Yes Peng Monae,    losartan (COZAAR) 25 MG tablet Take 1 tablet by mouth daily Yes Provider, MD Destin   Multiple Vitamins-Minerals (PRESERVISION AREDS) CAPS Take 1 capsule by mouth 2 times daily Yes Provider, MD Destin   hydrALAZINE (APRESOLINE) 25 MG tablet Take 1 tablet by mouth 2 times daily Yes Provider, MD Destin   digoxin (LANOXIN) 125 MCG tablet Take 1 tablet by mouth every 48 hours Yes Kilo Briceño DO   rivaroxaban (XARELTO) 15 MG TABS tablet Take 1 tablet by mouth daily (with breakfast) Yes Ciara Willams DO   metoprolol succinate (TOPROL XL) 25 MG extended release tablet Take 1 tablet by mouth in the morning and at bedtime New increased frequency  Eyal Bird MD       Review of Systems   Review of Systems   Constitutional:  Negative for chills and fever.   HENT:  Negative for ear pain, sinus pressure and sore throat.    Eyes:  Negative for pain, discharge and redness.   Respiratory:  Negative for cough, shortness of breath and wheezing.    Cardiovascular:  Negative for chest pain.   Gastrointestinal:  Negative for abdominal distention, diarrhea, nausea and vomiting.   Genitourinary:  Positive for dysuria, frequency, hematuria and pelvic pain.   Musculoskeletal:  Negative for arthralgias and back pain.   Skin:  Negative for rash and wound.   Neurological:  Negative for weakness and headaches.   Hematological:  Negative for adenopathy.   Psychiatric/Behavioral: Negative.     All other systems reviewed and are

## 2025-05-15 ENCOUNTER — TELEPHONE (OUTPATIENT)
Dept: AUDIOLOGY | Age: 85
End: 2025-05-15

## 2025-05-15 ENCOUNTER — APPOINTMENT (OUTPATIENT)
Dept: CT IMAGING | Age: 85
End: 2025-05-15
Payer: MEDICARE

## 2025-05-15 ENCOUNTER — PROCEDURE VISIT (OUTPATIENT)
Dept: AUDIOLOGY | Age: 85
End: 2025-05-15

## 2025-05-15 ENCOUNTER — HOSPITAL ENCOUNTER (EMERGENCY)
Age: 85
Discharge: HOME | End: 2025-05-15
Payer: MEDICARE

## 2025-05-15 VITALS
RESPIRATION RATE: 20 BRPM | OXYGEN SATURATION: 97 % | HEART RATE: 97 BPM | SYSTOLIC BLOOD PRESSURE: 170 MMHG | DIASTOLIC BLOOD PRESSURE: 118 MMHG | TEMPERATURE: 98.3 F

## 2025-05-15 DIAGNOSIS — H90.3 SENSORINEURAL HEARING LOSS, BILATERAL: Primary | ICD-10-CM

## 2025-05-15 DIAGNOSIS — R31.9 HEMATURIA, UNSPECIFIED TYPE: Primary | ICD-10-CM

## 2025-05-15 LAB
ALBUMIN SERPL-MCNC: 3.8 G/DL (ref 3.5–5.2)
ALP SERPL-CCNC: 141 U/L (ref 35–104)
ALT SERPL-CCNC: 13 U/L (ref 0–32)
ANION GAP SERPL CALCULATED.3IONS-SCNC: 12 MMOL/L (ref 7–16)
AST SERPL-CCNC: 15 U/L (ref 0–31)
BACTERIA URNS QL MICRO: ABNORMAL
BASOPHILS # BLD: 0.03 K/UL (ref 0–0.2)
BASOPHILS NFR BLD: 0 % (ref 0–2)
BILIRUB SERPL-MCNC: 0.3 MG/DL (ref 0–1.2)
BILIRUB UR QL STRIP: NEGATIVE
BUN SERPL-MCNC: 24 MG/DL (ref 6–23)
CALCIUM SERPL-MCNC: 9.6 MG/DL (ref 8.6–10.2)
CHLORIDE SERPL-SCNC: 109 MMOL/L (ref 98–107)
CLARITY UR: ABNORMAL
CO2 SERPL-SCNC: 20 MMOL/L (ref 22–29)
COLOR UR: ABNORMAL
CREAT SERPL-MCNC: 0.9 MG/DL (ref 0.5–1)
EOSINOPHIL # BLD: 0.15 K/UL (ref 0.05–0.5)
EOSINOPHILS RELATIVE PERCENT: 2 % (ref 0–6)
ERYTHROCYTE [DISTWIDTH] IN BLOOD BY AUTOMATED COUNT: 15.7 % (ref 11.5–15)
GFR, ESTIMATED: 59 ML/MIN/1.73M2
GLUCOSE SERPL-MCNC: 92 MG/DL (ref 74–99)
GLUCOSE UR STRIP-MCNC: NEGATIVE MG/DL
HCT VFR BLD AUTO: 40.5 % (ref 34–48)
HGB BLD-MCNC: 13 G/DL (ref 11.5–15.5)
HGB UR QL STRIP.AUTO: ABNORMAL
IMM GRANULOCYTES # BLD AUTO: 0.07 K/UL (ref 0–0.58)
IMM GRANULOCYTES NFR BLD: 1 % (ref 0–5)
INR PPP: 2.8
KETONES UR STRIP-MCNC: ABNORMAL MG/DL
LACTATE BLDV-SCNC: 1.2 MMOL/L (ref 0.5–2.2)
LEUKOCYTE ESTERASE UR QL STRIP: ABNORMAL
LYMPHOCYTES NFR BLD: 1.25 K/UL (ref 1.5–4)
LYMPHOCYTES RELATIVE PERCENT: 14 % (ref 20–42)
MCH RBC QN AUTO: 29.5 PG (ref 26–35)
MCHC RBC AUTO-ENTMCNC: 32.1 G/DL (ref 32–34.5)
MCV RBC AUTO: 91.8 FL (ref 80–99.9)
MONOCYTES NFR BLD: 0.52 K/UL (ref 0.1–0.95)
MONOCYTES NFR BLD: 6 % (ref 2–12)
NEUTROPHILS NFR BLD: 78 % (ref 43–80)
NEUTS SEG NFR BLD: 7.09 K/UL (ref 1.8–7.3)
NITRITE UR QL STRIP: POSITIVE
PH UR STRIP: 6.5 [PH] (ref 5–8)
PLATELET # BLD AUTO: 252 K/UL (ref 130–450)
PMV BLD AUTO: 10 FL (ref 7–12)
POTASSIUM SERPL-SCNC: 4.5 MMOL/L (ref 3.5–5)
PROT SERPL-MCNC: 6.7 G/DL (ref 6.4–8.3)
PROT UR STRIP-MCNC: >=300 MG/DL
PROTHROMBIN TIME: 30.5 SEC (ref 9.3–12.4)
RBC # BLD AUTO: 4.41 M/UL (ref 3.5–5.5)
RBC #/AREA URNS HPF: ABNORMAL /HPF
SODIUM SERPL-SCNC: 141 MMOL/L (ref 132–146)
SP GR UR STRIP: 1.02 (ref 1–1.03)
UROBILINOGEN UR STRIP-ACNC: 1 EU/DL (ref 0–1)
WBC #/AREA URNS HPF: ABNORMAL /HPF
WBC OTHER # BLD: 9.1 K/UL (ref 4.5–11.5)
YEAST URNS QL MICRO: PRESENT

## 2025-05-15 PROCEDURE — 74176 CT ABD & PELVIS W/O CONTRAST: CPT

## 2025-05-15 PROCEDURE — 99284 EMERGENCY DEPT VISIT MOD MDM: CPT

## 2025-05-15 PROCEDURE — 81001 URINALYSIS AUTO W/SCOPE: CPT

## 2025-05-15 PROCEDURE — 83605 ASSAY OF LACTIC ACID: CPT

## 2025-05-15 PROCEDURE — 85610 PROTHROMBIN TIME: CPT

## 2025-05-15 PROCEDURE — 80053 COMPREHEN METABOLIC PANEL: CPT

## 2025-05-15 PROCEDURE — 85025 COMPLETE CBC W/AUTO DIFF WBC: CPT
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
## 2025-05-15 NOTE — PROGRESS NOTES
Hearing aids cleaned and checked.  No issues noted.     checked and no issues noted.    Patient to return in 4 weeks.    Lowell Horner CCC/DEEDEE  Audiologist  [id number]  NPI#:  [id number]

## 2025-05-15 NOTE — TELEPHONE ENCOUNTER
Patient entered [facility] building for an audiology appointment and immediately started being verbally abusive to the [facility] staff.  Explained to patient that this behavior would not be tolerated, and that she required to sign in for every appointment in this building.    Patient was seen for her audiology appointment and she complained about not be able to be seen every week and it was, once again told she can only be scheduled every 4 weeks, due to provider schedule.    Lowell Horner CCC/DEEDEE  Audiologist  [id number]  NPI#:  [id number]

## 2025-05-15 NOTE — ED PROVIDER NOTES
Independent DAVINA Visit.        [facility] EMERGENCY DEPARTMENT  ED  Encounter Note  Admit Date/RoomTime: 5/15/2025  2:01 PM  ED Room:   NAME: Caitlyn Mitchell  : 1940  MRN: [id number]  PCP: Miguel Coleman MD    CHIEF COMPLAINT     Hematuria (Pt very hard of hearing, Recurring blood in urine with uti for past couple months, has been on abx x 4, was seen 3 days ago and put on new abx, pt states still bleeding, no pain, no fever)    HISTORY OF PRESENT ILLNESS        Caitlyn Mitchell is a 85 y.o. female who presents to the ED with complaints of hematuria that has been ongoing for several months.  Patient was seen 3 days ago and placed on cefdinir.  Patient with a history of genitourinary CA.  Patient denies abdominal pain.  Patient denies fever.  Patient still complains of dark-colored urine.  Patient denies nausea or vomiting.    REVIEW OF SYSTEMS     Pertinent positives and negatives are stated within HPI, all other systems reviewed and are negative.    Past Medical History:  has a past medical history of Anxiety, Cancer (HCC), DJD (degenerative joint disease), History of Holter monitoring, Hyperlipidemia, Hypertension, Irregular heart beat, Osteoarthritis, Preoperative clearance, Renal calculi, and Ureteral perforation secondary to stent manipulation.  Surgical History:  has a past surgical history that includes Cholecystectomy (N/A); Hysterectomy (N/A); skin biopsy; other surgical history (2015); Lithotripsy (Left, 2015); Total hip arthroplasty (Right, 2014); and Total hip arthroplasty (Left, 2016).  Social History:  reports that she has never smoked. She has never used smokeless tobacco. She reports current alcohol use. She reports that she does not use drugs.  Family History: family history includes Heart Disease in her father; Kidney Disease in her mother.   Allergies: Iodine, Doxycycline, Other/food, Cefoxitin sodium in dextrose, Hycomine compound

## 2025-05-15 NOTE — ED PROVIDER NOTES
Department of Emergency Medicine  FIRST PROVIDER TRIAGE NOTE             Independent MLP           5/15/25  12:31 PM EDT    Date of Encounter: 5/15/25   MRN: [id number]      HPI: Caitlyn Mitchell is a 85 y.o. female who presents to the ED for Hematuria (Pt very hard of hearing, Recurring blood in urine with uti for past couple months, has been on abx x 4, was seen 3 days ago and put on new abx, pt states still bleeding, no pain, no fever)      ROS: Negative for abd pain, back pain, fever, vomiting, or diarrhea.    PE: Gen Appearance/Constitutional: alert  CV: regular rate  Pulm: Effort normal    Provider-Patient relationship only established for Provider In Triage (PIT)  Full assessment, HPI and examination not performed, therefore, it is not yet possible to state whether or not an emergency medical condition exists   Focused assessment only during triage. This is not a comprehensive evaluation due to no physical ER space, staff shortage and high numbers of boarding patients in the ER     Initial Plan of Care: All treatment areas with department are currently occupied. Plan to order/Initiate the following while awaiting opening in ED:Proceed toTreatment Area When Bed Available for ED Attending/MLP to Continue Care    Electronically signed by PRIYANKA Coronel CNP   DD: 5/15/25        Galilea Goff APRN - CNP  05/17/25 2053

## 2025-05-16 ENCOUNTER — RESULTS FOLLOW-UP (OUTPATIENT)
Age: 85
End: 2025-05-16

## 2025-05-16 LAB
CULTURE: ABNORMAL
SPECIMEN DESCRIPTION: ABNORMAL

## 2025-05-23 ENCOUNTER — OFFICE VISIT (OUTPATIENT)
Dept: FAMILY MEDICINE CLINIC | Age: 85
End: 2025-05-23
Payer: MEDICARE

## 2025-05-23 VITALS
WEIGHT: 142 LBS | HEART RATE: 64 BPM | DIASTOLIC BLOOD PRESSURE: 95 MMHG | BODY MASS INDEX: 29.81 KG/M2 | TEMPERATURE: 97 F | SYSTOLIC BLOOD PRESSURE: 171 MMHG | OXYGEN SATURATION: 100 % | RESPIRATION RATE: 20 BRPM | HEIGHT: 58 IN

## 2025-05-23 DIAGNOSIS — N30.01 ACUTE CYSTITIS WITH HEMATURIA: Primary | ICD-10-CM

## 2025-05-23 DIAGNOSIS — R39.9 UTI SYMPTOMS: ICD-10-CM

## 2025-05-23 LAB
BILIRUBIN, POC: ABNORMAL
BLOOD URINE, POC: ABNORMAL
CLARITY, POC: ABNORMAL
COLOR, POC: ABNORMAL
GLUCOSE URINE, POC: NEGATIVE MG/DL
KETONES, POC: NEGATIVE MG/DL
LEUKOCYTE EST, POC: ABNORMAL
NITRITE, POC: POSITIVE
PH, POC: 6
PROTEIN, POC: 300 MG/DL
SPECIFIC GRAVITY, POC: 1.03
UROBILINOGEN, POC: 0.2 MG/DL

## 2025-05-23 PROCEDURE — 99213 OFFICE O/P EST LOW 20 MIN: CPT

## 2025-05-23 PROCEDURE — G8417 CALC BMI ABV UP PARAM F/U: HCPCS

## 2025-05-23 PROCEDURE — 1160F RVW MEDS BY RX/DR IN RCRD: CPT

## 2025-05-23 PROCEDURE — G8427 DOCREV CUR MEDS BY ELIG CLIN: HCPCS

## 2025-05-23 PROCEDURE — 1090F PRES/ABSN URINE INCON ASSESS: CPT

## 2025-05-23 PROCEDURE — 1036F TOBACCO NON-USER: CPT

## 2025-05-23 PROCEDURE — 81002 URINALYSIS NONAUTO W/O SCOPE: CPT

## 2025-05-23 PROCEDURE — 1159F MED LIST DOCD IN RCRD: CPT

## 2025-05-23 PROCEDURE — 1124F ACP DISCUSS-NO DSCNMKR DOCD: CPT

## 2025-05-23 RX ORDER — LEVOFLOXACIN 250 MG/1
250 TABLET, FILM COATED ORAL DAILY
Qty: 7 TABLET | Refills: 0 | Status: SHIPPED | OUTPATIENT
Start: 2025-05-23 | End: 2025-05-30

## 2025-05-23 NOTE — PROGRESS NOTES
Chief Complaint       Urinary Tract Infection (Urinary frequency, pressure, odor, discharge blood in urine for 6 days )      History of Present Illness   Source of history provided by:  patient.     Caitlyn Mitchell is a 85 y.o. old female presenting to the walk in clinic for evaluation of dysuria x 6 days. Reports associated frequency, urgency, and malodorous urine. Endorses gross hematuria.  Denies associated flank pain. Denies any fever, chills, vomiting, diarrhea, or lethargy. Denies any vaginal discharge, vaginal bleeding, possibility of pregnancy. No LMP recorded (lmp unknown). Patient has had a hysterectomy. Pt has been seen multiple times with same complaint and has completed three courses of antibiotics in the past two months. Pt was referred to Dr. Ray but refused to go stating she did not like the office he worked at. Pt states she see Dr. Keenan in [place] on 6/11/25.      ROS    Unless otherwise stated in this report or unable to obtain because of the patient's clinical or mental status as evidenced by the medical record, this patients's positive and negative responses for Review of Systems, constitutional, psych, eyes, ENT, cardiovascular, respiratory, gastrointestinal, neurological, genitourinary, musculoskeletal, integument systems and systems related to the presenting problem are either stated in the preceding or were not pertinent or were negative for the symptoms and/or complaints related to the medical problem.    Physical Exam         VS:  BP (!) 171/95   Pulse 64   Temp 97 °F (36.1 °C) (Infrared)   Resp 20   Ht 1.473 m (4' 10\")   Wt 64.4 kg (142 lb)   LMP  (LMP Unknown)   SpO2 100%   BMI 29.68 kg/m²    Oxygen Saturation Interpretation: Normal.    Constitutional:  A&Ox3, development consistent with age, NAD.  Lungs:  CTAB without wheezing, rales, or rhonchi.  Heart:  RRR without pathologic murmurs, rubs, or gallops.  Abdomen: Soft, nondistended, with mild suprapubic tenderness.

## 2025-05-25 LAB
CULTURE: ABNORMAL
SPECIMEN DESCRIPTION: ABNORMAL

## 2025-05-27 ENCOUNTER — RESULTS FOLLOW-UP (OUTPATIENT)
Dept: FAMILY MEDICINE CLINIC | Age: 85
End: 2025-05-27

## 2025-05-27 LAB
CULTURE: ABNORMAL
SPECIMEN DESCRIPTION: ABNORMAL

## 2025-05-28 ENCOUNTER — TELEPHONE (OUTPATIENT)
Dept: AUDIOLOGY | Age: 85
End: 2025-05-28

## 2025-05-28 NOTE — TELEPHONE ENCOUNTER
Patient called and left a voicemail message for her upcoming appointment information.  She left a voicemail at the [facility] office phone line. Returned patient message. Left a voicemail for her to call back. [facility] audiology phone number left for the patient.    Electronically signed by Janett Perez on 5/28/2025 at 3:22 PM

## 2025-06-03 ENCOUNTER — TELEPHONE (OUTPATIENT)
Dept: AUDIOLOGY | Age: 85
End: 2025-06-03

## 2025-06-03 NOTE — TELEPHONE ENCOUNTER
Patient called and left a voicemail message.   Returned patient message. Left a voicemail at the patient contact number.    Electronically signed by Janett Patel on 6/3/2025 at 8:10 AM

## 2025-06-12 ENCOUNTER — PROCEDURE VISIT (OUTPATIENT)
Dept: AUDIOLOGY | Age: 85
End: 2025-06-12

## 2025-06-12 DIAGNOSIS — H90.3 SENSORINEURAL HEARING LOSS, BILATERAL: Primary | ICD-10-CM

## 2025-06-12 NOTE — PROGRESS NOTES
Patient seen for hearing aid cleaning and check.    No other issues noted.    Patient scheduled for 7/10/25 for ARGUELLO cleaning.    Lowell Horner CCC/DEEDEE  Audiologist  [id number]  NPI#:  [id number]

## 2025-06-25 ENCOUNTER — TELEPHONE (OUTPATIENT)
Dept: AUDIOLOGY | Age: 85
End: 2025-06-25

## 2025-06-25 NOTE — TELEPHONE ENCOUNTER
Patient called and left a voicemail message.   Returned patient message. Left a voicemail at the patient contact number.    Electronically signed by Janett Patel on 6/25/2025 at 8:59 AM

## 2025-07-06 ENCOUNTER — HOSPITAL ENCOUNTER (INPATIENT)
Age: 85
LOS: 4 days | Discharge: NURSING, REHAB OR LONG-TERM CARE | DRG: 516 | End: 2025-07-10
Attending: EMERGENCY MEDICINE | Admitting: INTERNAL MEDICINE
Payer: MEDICARE

## 2025-07-06 ENCOUNTER — APPOINTMENT (OUTPATIENT)
Dept: CT IMAGING | Age: 85
DRG: 516 | End: 2025-07-06
Payer: MEDICARE

## 2025-07-06 ENCOUNTER — APPOINTMENT (OUTPATIENT)
Dept: GENERAL RADIOLOGY | Age: 85
DRG: 516 | End: 2025-07-06
Payer: MEDICARE

## 2025-07-06 DIAGNOSIS — S82.031A CLOSED DISPLACED TRANSVERSE FRACTURE OF RIGHT PATELLA, INITIAL ENCOUNTER: Primary | ICD-10-CM

## 2025-07-06 DIAGNOSIS — S82.001A CLOSED DISPLACED FRACTURE OF RIGHT PATELLA, UNSPECIFIED FRACTURE MORPHOLOGY, INITIAL ENCOUNTER: ICD-10-CM
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
## 2025-07-06 PROBLEM — S82.009A: Status: ACTIVE | Noted: 2025-07-06

## 2025-07-06 LAB
ABO + RH BLD: NORMAL
ALBUMIN SERPL-MCNC: 3.9 G/DL (ref 3.5–5.2)
ALP SERPL-CCNC: 127 U/L (ref 35–104)
ALT SERPL-CCNC: 17 U/L (ref 0–35)
ANION GAP SERPL CALCULATED.3IONS-SCNC: 13 MMOL/L (ref 7–16)
ARM BAND NUMBER: NORMAL
AST SERPL-CCNC: 21 U/L (ref 0–35)
BASOPHILS # BLD: 0.03 K/UL (ref 0–0.2)
BASOPHILS NFR BLD: 0 % (ref 0–2)
BILIRUB SERPL-MCNC: 0.7 MG/DL (ref 0–1.2)
BLOOD BANK SAMPLE EXPIRATION: NORMAL
BLOOD GROUP ANTIBODIES SERPL: NEGATIVE
BUN SERPL-MCNC: 21 MG/DL (ref 8–23)
CALCIUM SERPL-MCNC: 9.2 MG/DL (ref 8.8–10.2)
CHLORIDE SERPL-SCNC: 108 MMOL/L (ref 98–107)
CO2 SERPL-SCNC: 20 MMOL/L (ref 22–29)
CREAT SERPL-MCNC: 0.8 MG/DL (ref 0.5–1)
EOSINOPHIL # BLD: 0.03 K/UL (ref 0.05–0.5)
EOSINOPHILS RELATIVE PERCENT: 0 % (ref 0–6)
ERYTHROCYTE [DISTWIDTH] IN BLOOD BY AUTOMATED COUNT: 16.2 % (ref 11.5–15)
GFR, ESTIMATED: 75 ML/MIN/1.73M2
GLUCOSE SERPL-MCNC: 95 MG/DL (ref 74–99)
HCT VFR BLD AUTO: 40.8 % (ref 34–48)
HGB BLD-MCNC: 13.3 G/DL (ref 11.5–15.5)
IMM GRANULOCYTES # BLD AUTO: 0.11 K/UL (ref 0–0.58)
IMM GRANULOCYTES NFR BLD: 1 % (ref 0–5)
INR PPP: 1.4
LYMPHOCYTES NFR BLD: 0.87 K/UL (ref 1.5–4)
LYMPHOCYTES RELATIVE PERCENT: 7 % (ref 20–42)
MCH RBC QN AUTO: 29.6 PG (ref 26–35)
MCHC RBC AUTO-ENTMCNC: 32.6 G/DL (ref 32–34.5)
MCV RBC AUTO: 90.7 FL (ref 80–99.9)
MONOCYTES NFR BLD: 0.5 K/UL (ref 0.1–0.95)
MONOCYTES NFR BLD: 4 % (ref 2–12)
NEUTROPHILS NFR BLD: 87 % (ref 43–80)
NEUTS SEG NFR BLD: 10.22 K/UL (ref 1.8–7.3)
PLATELET # BLD AUTO: 183 K/UL (ref 130–450)
PMV BLD AUTO: 9.4 FL (ref 7–12)
POTASSIUM SERPL-SCNC: 4.1 MMOL/L (ref 3.5–5.1)
PROT SERPL-MCNC: 6.6 G/DL (ref 6.4–8.3)
PROTHROMBIN TIME: 14.6 SEC (ref 9.3–12.4)
RBC # BLD AUTO: 4.5 M/UL (ref 3.5–5.5)
SODIUM SERPL-SCNC: 141 MMOL/L (ref 136–145)
WBC OTHER # BLD: 11.8 K/UL (ref 4.5–11.5)

## 2025-07-06 PROCEDURE — 85610 PROTHROMBIN TIME: CPT

## 2025-07-06 PROCEDURE — 73560 X-RAY EXAM OF KNEE 1 OR 2: CPT

## 2025-07-06 PROCEDURE — 71045 X-RAY EXAM CHEST 1 VIEW: CPT

## 2025-07-06 PROCEDURE — 80053 COMPREHEN METABOLIC PANEL: CPT

## 2025-07-06 PROCEDURE — 6370000000 HC RX 637 (ALT 250 FOR IP): Performed by: INTERNAL MEDICINE

## 2025-07-06 PROCEDURE — 73521 X-RAY EXAM HIPS BI 2 VIEWS: CPT

## 2025-07-06 PROCEDURE — 86900 BLOOD TYPING SEROLOGIC ABO: CPT

## 2025-07-06 PROCEDURE — 2060000000 HC ICU INTERMEDIATE R&B

## 2025-07-06 PROCEDURE — 85025 COMPLETE CBC W/AUTO DIFF WBC: CPT

## 2025-07-06 PROCEDURE — 99285 EMERGENCY DEPT VISIT HI MDM: CPT

## 2025-07-06 PROCEDURE — 6370000000 HC RX 637 (ALT 250 FOR IP)

## 2025-07-06 PROCEDURE — 2500000003 HC RX 250 WO HCPCS: Performed by: INTERNAL MEDICINE

## 2025-07-06 PROCEDURE — 86901 BLOOD TYPING SEROLOGIC RH(D): CPT

## 2025-07-06 PROCEDURE — 96374 THER/PROPH/DIAG INJ IV PUSH: CPT

## 2025-07-06 PROCEDURE — 73562 X-RAY EXAM OF KNEE 3: CPT

## 2025-07-06 PROCEDURE — 2580000003 HC RX 258: Performed by: INTERNAL MEDICINE

## 2025-07-06 PROCEDURE — 2580000003 HC RX 258

## 2025-07-06 PROCEDURE — 86850 RBC ANTIBODY SCREEN: CPT

## 2025-07-06 PROCEDURE — 72125 CT NECK SPINE W/O DYE: CPT

## 2025-07-06 PROCEDURE — 6360000002 HC RX W HCPCS

## 2025-07-06 PROCEDURE — 70450 CT HEAD/BRAIN W/O DYE: CPT

## 2025-07-06 RX ORDER — ACETAMINOPHEN 650 MG/1
650 SUPPOSITORY RECTAL EVERY 6 HOURS PRN
Status: DISCONTINUED | OUTPATIENT
Start: 2025-07-06 | End: 2025-07-10 | Stop reason: HOSPADM

## 2025-07-06 RX ORDER — LOSARTAN POTASSIUM 50 MG/1
25 TABLET ORAL ONCE
Status: COMPLETED | OUTPATIENT
Start: 2025-07-06 | End: 2025-07-06

## 2025-07-06 RX ORDER — POLYETHYLENE GLYCOL 3350 17 G/17G
17 POWDER, FOR SOLUTION ORAL DAILY PRN
Status: DISCONTINUED | OUTPATIENT
Start: 2025-07-06 | End: 2025-07-10 | Stop reason: HOSPADM

## 2025-07-06 RX ORDER — OXYCODONE AND ACETAMINOPHEN 5; 325 MG/1; MG/1
1 TABLET ORAL EVERY 4 HOURS PRN
Status: DISCONTINUED | OUTPATIENT
Start: 2025-07-06 | End: 2025-07-10 | Stop reason: HOSPADM

## 2025-07-06 RX ORDER — 0.9 % SODIUM CHLORIDE 0.9 %
1000 INTRAVENOUS SOLUTION INTRAVENOUS ONCE
Status: COMPLETED | OUTPATIENT
Start: 2025-07-06 | End: 2025-07-06

## 2025-07-06 RX ORDER — ONDANSETRON 2 MG/ML
4 INJECTION INTRAMUSCULAR; INTRAVENOUS EVERY 6 HOURS PRN
Status: DISCONTINUED | OUTPATIENT
Start: 2025-07-06 | End: 2025-07-10 | Stop reason: HOSPADM

## 2025-07-06 RX ORDER — DIGOXIN 125 MCG
125 TABLET ORAL
Status: DISCONTINUED | OUTPATIENT
Start: 2025-07-07 | End: 2025-07-10 | Stop reason: HOSPADM

## 2025-07-06 RX ORDER — ONDANSETRON 2 MG/ML
4 INJECTION INTRAMUSCULAR; INTRAVENOUS ONCE
Status: COMPLETED | OUTPATIENT
Start: 2025-07-06 | End: 2025-07-06

## 2025-07-06 RX ORDER — SODIUM CHLORIDE 9 MG/ML
INJECTION, SOLUTION INTRAVENOUS CONTINUOUS
Status: ACTIVE | OUTPATIENT
Start: 2025-07-06 | End: 2025-07-07

## 2025-07-06 RX ORDER — SODIUM CHLORIDE 9 MG/ML
INJECTION, SOLUTION INTRAVENOUS PRN
Status: DISCONTINUED | OUTPATIENT
Start: 2025-07-06 | End: 2025-07-10 | Stop reason: HOSPADM

## 2025-07-06 RX ORDER — METOPROLOL SUCCINATE 25 MG/1
25 TABLET, EXTENDED RELEASE ORAL DAILY
Status: ON HOLD | COMMUNITY
End: 2025-07-10 | Stop reason: HOSPADM

## 2025-07-06 RX ORDER — ONDANSETRON 4 MG/1
4 TABLET, ORALLY DISINTEGRATING ORAL EVERY 8 HOURS PRN
Status: DISCONTINUED | OUTPATIENT
Start: 2025-07-06 | End: 2025-07-10 | Stop reason: HOSPADM

## 2025-07-06 RX ORDER — HYDRALAZINE HYDROCHLORIDE 25 MG/1
25 TABLET, FILM COATED ORAL 2 TIMES DAILY
Status: DISCONTINUED | OUTPATIENT
Start: 2025-07-06 | End: 2025-07-10 | Stop reason: HOSPADM

## 2025-07-06 RX ORDER — METOPROLOL SUCCINATE 25 MG/1
25 TABLET, EXTENDED RELEASE ORAL 2 TIMES DAILY
Status: DISCONTINUED | OUTPATIENT
Start: 2025-07-06 | End: 2025-07-10 | Stop reason: HOSPADM

## 2025-07-06 RX ORDER — SODIUM CHLORIDE 0.9 % (FLUSH) 0.9 %
5-40 SYRINGE (ML) INJECTION PRN
Status: DISCONTINUED | OUTPATIENT
Start: 2025-07-06 | End: 2025-07-10 | Stop reason: HOSPADM

## 2025-07-06 RX ORDER — SODIUM CHLORIDE 0.9 % (FLUSH) 0.9 %
5-40 SYRINGE (ML) INJECTION EVERY 12 HOURS SCHEDULED
Status: DISCONTINUED | OUTPATIENT
Start: 2025-07-06 | End: 2025-07-10 | Stop reason: HOSPADM

## 2025-07-06 RX ORDER — HYDROCODONE BITARTRATE AND ACETAMINOPHEN 5; 325 MG/1; MG/1
1 TABLET ORAL ONCE
Status: COMPLETED | OUTPATIENT
Start: 2025-07-06 | End: 2025-07-06

## 2025-07-06 RX ORDER — MORPHINE SULFATE 2 MG/ML
2 INJECTION, SOLUTION INTRAMUSCULAR; INTRAVENOUS EVERY 4 HOURS PRN
Status: DISCONTINUED | OUTPATIENT
Start: 2025-07-06 | End: 2025-07-10 | Stop reason: HOSPADM

## 2025-07-06 RX ORDER — HYDRALAZINE HYDROCHLORIDE 25 MG/1
25 TABLET, FILM COATED ORAL ONCE
Status: COMPLETED | OUTPATIENT
Start: 2025-07-06 | End: 2025-07-06

## 2025-07-06 RX ORDER — LOSARTAN POTASSIUM 50 MG/1
25 TABLET ORAL DAILY
Status: DISCONTINUED | OUTPATIENT
Start: 2025-07-07 | End: 2025-07-10 | Stop reason: HOSPADM

## 2025-07-06 RX ORDER — ACETAMINOPHEN 325 MG/1
650 TABLET ORAL EVERY 6 HOURS PRN
Status: DISCONTINUED | OUTPATIENT
Start: 2025-07-06 | End: 2025-07-10 | Stop reason: HOSPADM

## 2025-07-06 RX ADMIN — HYDRALAZINE HYDROCHLORIDE 25 MG: 25 TABLET ORAL at 12:31

## 2025-07-06 RX ADMIN — HYDRALAZINE HYDROCHLORIDE 25 MG: 25 TABLET ORAL at 20:34

## 2025-07-06 RX ADMIN — SODIUM CHLORIDE 1000 ML: 0.9 INJECTION, SOLUTION INTRAVENOUS at 16:06

## 2025-07-06 RX ADMIN — HYDROCODONE BITARTRATE AND ACETAMINOPHEN 1 TABLET: 5; 325 TABLET ORAL at 11:20

## 2025-07-06 RX ADMIN — LOSARTAN POTASSIUM 25 MG: 50 TABLET, FILM COATED ORAL at 12:31

## 2025-07-06 RX ADMIN — SODIUM CHLORIDE: 0.9 INJECTION, SOLUTION INTRAVENOUS at 20:40

## 2025-07-06 RX ADMIN — Medication 10 ML: at 20:15

## 2025-07-06 RX ADMIN — METOPROLOL SUCCINATE 25 MG: 25 TABLET, EXTENDED RELEASE ORAL at 20:34

## 2025-07-06 RX ADMIN — ONDANSETRON 4 MG: 2 INJECTION, SOLUTION INTRAMUSCULAR; INTRAVENOUS at 16:06

## 2025-07-06 RX ADMIN — OXYCODONE AND ACETAMINOPHEN 1 TABLET: 5; 325 TABLET ORAL at 20:34

## 2025-07-06 ASSESSMENT — PAIN SCALES - GENERAL
PAINLEVEL_OUTOF10: 7
PAINLEVEL_OUTOF10: 10
PAINLEVEL_OUTOF10: 10

## 2025-07-06 ASSESSMENT — PAIN DESCRIPTION - LOCATION
LOCATION: KNEE
LOCATION: LEG

## 2025-07-06 ASSESSMENT — PAIN DESCRIPTION - ORIENTATION
ORIENTATION: RIGHT
ORIENTATION: RIGHT

## 2025-07-06 ASSESSMENT — PAIN DESCRIPTION - DESCRIPTORS: DESCRIPTORS: ACHING;THROBBING;DISCOMFORT

## 2025-07-06 ASSESSMENT — PAIN - FUNCTIONAL ASSESSMENT: PAIN_FUNCTIONAL_ASSESSMENT: PREVENTS OR INTERFERES SOME ACTIVE ACTIVITIES AND ADLS

## 2025-07-06 NOTE — ED PROVIDER NOTES
Department of Emergency Medicine     Written by: Nate Ramos DO  Patient Name: Caitlyn Mitchell  Admit Date: 2025 10:21 AM  MRN: [id number]                   : 1940      CHIEF COMPLAINT     Chief Complaint   Patient presents with    Fall     Pt states she tripped at [employer] and fell. Pt hit her face. Denies LOC. Pt on thinners. Pt states BP elevated but she does not take BP meds on empty stomach before [employer].    Knee Injury     Pt reports R knee pain from fall     HPI     Caitlyn Mitchell is a 85 y.o. female who presents to the emergency department today complaining of a fall at [employer].  Patient states she was walking out of [employer] when her right foot caught on the corner of the carpet causing her to fall.  She hurt her right knee and has some pain in that location.  She also had a little bit of bleeding from the bridge of her nose.  She states she is on Xarelto for history of atrial fibrillation.  She states he is having significant pain in her right knee that has been difficult for her to ambulate currently.  Patient denies any lightheadedness or dizziness, fever or chills, nausea or vomiting, chest pain, shortness of breath, abdominal pain, hematuria or dysuria, constipation or diarrhea.    Nursing notes were all reviewed and agreed with or any disagreements were addressed in the HPI.    REVIEW OF SYSTEMS:    Pertinent positives and negatives mentioned in the HPI/MDM.     REVIEW OF OUTSIDE RECORDS: Chart reviewed from 2025 walk-in visit patient was seen for UTI.    SOCIAL DETERMINANTS OF HEALTH: Patient has good medical compliance and fluency as well as established follow-up with family physician.    PAST HISTORY     I personally reviewed the following history:    Past Medical History:  has a past medical history of Anxiety, Cancer (HCC), DJD (degenerative joint disease), History of Holter monitoring, Hyperlipidemia, Hypertension, Irregular heart beat, Osteoarthritis, Preoperative

## 2025-07-06 NOTE — ED NOTES
Report given to [facility] [place] [place]. All questions answered at this time.

## 2025-07-06 NOTE — H&P
ARTHROPLASTY Right 06/02/2014    right hip ZANE Clayton MD    TOTAL HIP ARTHROPLASTY Left 07/18/2016    left hip ZANE Clayton MD       Medications Prior to Admission:    Prior to Admission medications    Medication Sig Start Date End Date Taking? Authorizing Provider   metoprolol succinate (TOPROL XL) 25 MG extended release tablet Take 1 tablet by mouth in the morning and at bedtime New increased frequency 2/19/25 3/21/25  Eyal Bird MD   losartan (COZAAR) 25 MG tablet Take 1 tablet by mouth daily    ProviderDestin MD   Multiple Vitamins-Minerals (PRESERVISION AREDS) CAPS Take 1 capsule by mouth 2 times daily    Destin Mueller MD   hydrALAZINE (APRESOLINE) 25 MG tablet Take 1 tablet by mouth 2 times daily    Destin Mueller MD   digoxin (LANOXIN) 125 MCG tablet Take 1 tablet by mouth every 48 hours 5/27/24   Kilo Briceño DO   rivaroxaban (XARELTO) 15 MG TABS tablet Take 1 tablet by mouth daily (with breakfast) 5/26/24   Ciara Willams DO       Allergies:    Iodine, Doxycycline, Other, Cefoxitin sodium in dextrose, Hycomine compound [pe-cpm-hydrocod-apap-caff], Iodine, and Mefoxin [cefoxitin]    Social History:    reports that she has never smoked. She has never used smokeless tobacco. She reports current alcohol use. She reports that she does not use drugs.    Family History:       Problem Relation Age of Onset    Kidney Disease Mother     Heart Disease Father            PHYSICAL EXAM:    Vitals:  BP (!) 142/96   Pulse (!) 105   Temp 98.1 °F (36.7 °C) (Oral)   Resp 23   LMP  (LMP Unknown)   SpO2 92%     General:  Appears comfortable. Answers questions appropriately and cooperative with exam. Pt very [place].  HEENT:  Mucous membranes moist. No erythema, rhinorrhea, or post-nasal drip noted. Bruising noted  Neck:  No carotid bruits.  Heart:  Rhythm regular at rate of 108  Lungs:  CTA.  No wheeze, rales, or rhonchi  Abdomen:  Positive bowel sounds positive.  Soft.  Non-tender. No

## 2025-07-07 ENCOUNTER — APPOINTMENT (OUTPATIENT)
Dept: GENERAL RADIOLOGY | Age: 85
DRG: 516 | End: 2025-07-07
Payer: MEDICARE

## 2025-07-07 ENCOUNTER — ANESTHESIA EVENT (OUTPATIENT)
Dept: OPERATING ROOM | Age: 85
DRG: 516 | End: 2025-07-07
Payer: MEDICARE

## 2025-07-07 ENCOUNTER — ANESTHESIA (OUTPATIENT)
Dept: OPERATING ROOM | Age: 85
DRG: 516 | End: 2025-07-07
Payer: MEDICARE

## 2025-07-07 PROBLEM — E86.0 DEHYDRATION: Status: ACTIVE | Noted: 2025-07-07

## 2025-07-07 LAB
ALBUMIN SERPL-MCNC: 3.5 G/DL (ref 3.5–5.2)
ALP SERPL-CCNC: 117 U/L (ref 35–104)
ALT SERPL-CCNC: 14 U/L (ref 0–35)
ANION GAP SERPL CALCULATED.3IONS-SCNC: 11 MMOL/L (ref 7–16)
AST SERPL-CCNC: 18 U/L (ref 0–35)
BASOPHILS # BLD: 0.02 K/UL (ref 0–0.2)
BASOPHILS NFR BLD: 0 % (ref 0–2)
BILIRUB SERPL-MCNC: 0.7 MG/DL (ref 0–1.2)
BUN SERPL-MCNC: 21 MG/DL (ref 8–23)
CALCIUM SERPL-MCNC: 8.4 MG/DL (ref 8.8–10.2)
CHLORIDE SERPL-SCNC: 110 MMOL/L (ref 98–107)
CO2 SERPL-SCNC: 20 MMOL/L (ref 22–29)
CREAT SERPL-MCNC: 0.8 MG/DL (ref 0.5–1)
EOSINOPHIL # BLD: 0.08 K/UL (ref 0.05–0.5)
EOSINOPHILS RELATIVE PERCENT: 1 % (ref 0–6)
ERYTHROCYTE [DISTWIDTH] IN BLOOD BY AUTOMATED COUNT: 16.3 % (ref 11.5–15)
GFR, ESTIMATED: 68 ML/MIN/1.73M2
GLUCOSE SERPL-MCNC: 102 MG/DL (ref 74–99)
HCT VFR BLD AUTO: 38.9 % (ref 34–48)
HGB BLD-MCNC: 12.5 G/DL (ref 11.5–15.5)
IMM GRANULOCYTES # BLD AUTO: <0.03 K/UL (ref 0–0.58)
IMM GRANULOCYTES NFR BLD: 0 % (ref 0–5)
LACTATE BLDV-SCNC: 1.3 MMOL/L (ref 0.5–2.2)
LYMPHOCYTES NFR BLD: 1.09 K/UL (ref 1.5–4)
LYMPHOCYTES RELATIVE PERCENT: 15 % (ref 20–42)
MAGNESIUM SERPL-MCNC: 1.8 MG/DL (ref 1.6–2.4)
MCH RBC QN AUTO: 29.3 PG (ref 26–35)
MCHC RBC AUTO-ENTMCNC: 32.1 G/DL (ref 32–34.5)
MCV RBC AUTO: 91.3 FL (ref 80–99.9)
MONOCYTES NFR BLD: 0.7 K/UL (ref 0.1–0.95)
MONOCYTES NFR BLD: 9 % (ref 2–12)
NEUTROPHILS NFR BLD: 74 % (ref 43–80)
NEUTS SEG NFR BLD: 5.55 K/UL (ref 1.8–7.3)
PHOSPHATE SERPL-MCNC: 3.4 MG/DL (ref 2.5–4.5)
PLATELET # BLD AUTO: 168 K/UL (ref 130–450)
PMV BLD AUTO: 10.3 FL (ref 7–12)
POTASSIUM SERPL-SCNC: 4.4 MMOL/L (ref 3.5–5.1)
PROT SERPL-MCNC: 5.8 G/DL (ref 6.4–8.3)
RBC # BLD AUTO: 4.26 M/UL (ref 3.5–5.5)
SODIUM SERPL-SCNC: 141 MMOL/L (ref 136–145)
WBC OTHER # BLD: 7.5 K/UL (ref 4.5–11.5)

## 2025-07-07 PROCEDURE — 6360000002 HC RX W HCPCS

## 2025-07-07 PROCEDURE — 7100000001 HC PACU RECOVERY - ADDTL 15 MIN: Performed by: ORTHOPAEDIC SURGERY

## 2025-07-07 PROCEDURE — 3700000001 HC ADD 15 MINUTES (ANESTHESIA): Performed by: ORTHOPAEDIC SURGERY

## 2025-07-07 PROCEDURE — 3600000004 HC SURGERY LEVEL 4 BASE: Performed by: ORTHOPAEDIC SURGERY

## 2025-07-07 PROCEDURE — 83735 ASSAY OF MAGNESIUM: CPT

## 2025-07-07 PROCEDURE — C1769 GUIDE WIRE: HCPCS | Performed by: ORTHOPAEDIC SURGERY

## 2025-07-07 PROCEDURE — 2580000003 HC RX 258: Performed by: INTERNAL MEDICINE

## 2025-07-07 PROCEDURE — 2709999900 HC NON-CHARGEABLE SUPPLY: Performed by: ORTHOPAEDIC SURGERY

## 2025-07-07 PROCEDURE — 6370000000 HC RX 637 (ALT 250 FOR IP): Performed by: INTERNAL MEDICINE

## 2025-07-07 PROCEDURE — 3600000014 HC SURGERY LEVEL 4 ADDTL 15MIN: Performed by: ORTHOPAEDIC SURGERY

## 2025-07-07 PROCEDURE — 2060000000 HC ICU INTERMEDIATE R&B

## 2025-07-07 PROCEDURE — 83605 ASSAY OF LACTIC ACID: CPT

## 2025-07-07 PROCEDURE — 2500000003 HC RX 250 WO HCPCS: Performed by: INTERNAL MEDICINE

## 2025-07-07 PROCEDURE — 84100 ASSAY OF PHOSPHORUS: CPT

## 2025-07-07 PROCEDURE — 0QSD04Z REPOSITION RIGHT PATELLA WITH INTERNAL FIXATION DEVICE, OPEN APPROACH: ICD-10-PCS

## 2025-07-07 PROCEDURE — 2720000010 HC SURG SUPPLY STERILE: Performed by: ORTHOPAEDIC SURGERY

## 2025-07-07 PROCEDURE — 36415 COLL VENOUS BLD VENIPUNCTURE: CPT

## 2025-07-07 PROCEDURE — 7100000000 HC PACU RECOVERY - FIRST 15 MIN: Performed by: ORTHOPAEDIC SURGERY

## 2025-07-07 PROCEDURE — 3700000000 HC ANESTHESIA ATTENDED CARE: Performed by: ORTHOPAEDIC SURGERY

## 2025-07-07 PROCEDURE — 85025 COMPLETE CBC W/AUTO DIFF WBC: CPT

## 2025-07-07 PROCEDURE — C1776 JOINT DEVICE (IMPLANTABLE): HCPCS | Performed by: ORTHOPAEDIC SURGERY

## 2025-07-07 PROCEDURE — 2580000003 HC RX 258

## 2025-07-07 PROCEDURE — L3650 SO 8 ABD RESTRAINT PRE OTS: HCPCS | Performed by: ORTHOPAEDIC SURGERY

## 2025-07-07 PROCEDURE — 80053 COMPREHEN METABOLIC PANEL: CPT

## 2025-07-07 PROCEDURE — C1713 ANCHOR/SCREW BN/BN,TIS/BN: HCPCS | Performed by: ORTHOPAEDIC SURGERY

## 2025-07-07 PROCEDURE — 6360000002 HC RX W HCPCS: Performed by: ANESTHESIOLOGY

## 2025-07-07 DEVICE — CABLE SURG L750MM DIA1MM S STL SMOOTH W/ CRMP: Type: IMPLANTABLE DEVICE | Site: PATELLA | Status: FUNCTIONAL

## 2025-07-07 DEVICE — SCREW BNE L30MM DIA4MM S STL CANN SHT 1/3 THRD SM HEX SOCK: Type: IMPLANTABLE DEVICE | Site: PATELLA | Status: FUNCTIONAL

## 2025-07-07 DEVICE — SCREW BNE L34MM DIA4MM S STL CANN SHT 1/3 THRD SM HEX SOCK: Type: IMPLANTABLE DEVICE | Site: PATELLA | Status: FUNCTIONAL

## 2025-07-07 RX ORDER — SODIUM CHLORIDE 0.9 % (FLUSH) 0.9 %
5-40 SYRINGE (ML) INJECTION PRN
Status: DISCONTINUED | OUTPATIENT
Start: 2025-07-07 | End: 2025-07-07 | Stop reason: HOSPADM

## 2025-07-07 RX ORDER — SODIUM CHLORIDE, SODIUM LACTATE, POTASSIUM CHLORIDE, CALCIUM CHLORIDE 600; 310; 30; 20 MG/100ML; MG/100ML; MG/100ML; MG/100ML
INJECTION, SOLUTION INTRAVENOUS
Status: DISCONTINUED | OUTPATIENT
Start: 2025-07-07 | End: 2025-07-07 | Stop reason: SDUPTHER

## 2025-07-07 RX ORDER — PROPOFOL 10 MG/ML
INJECTION, EMULSION INTRAVENOUS
Status: DISCONTINUED | OUTPATIENT
Start: 2025-07-07 | End: 2025-07-07 | Stop reason: SDUPTHER

## 2025-07-07 RX ORDER — SODIUM CHLORIDE 0.9 % (FLUSH) 0.9 %
5-40 SYRINGE (ML) INJECTION EVERY 12 HOURS SCHEDULED
Status: DISCONTINUED | OUTPATIENT
Start: 2025-07-07 | End: 2025-07-07 | Stop reason: HOSPADM

## 2025-07-07 RX ORDER — MIDAZOLAM HYDROCHLORIDE 1 MG/ML
2 INJECTION, SOLUTION INTRAMUSCULAR; INTRAVENOUS
Status: DISCONTINUED | OUTPATIENT
Start: 2025-07-07 | End: 2025-07-07 | Stop reason: HOSPADM

## 2025-07-07 RX ORDER — KETOROLAC TROMETHAMINE 30 MG/ML
INJECTION, SOLUTION INTRAMUSCULAR; INTRAVENOUS
Status: DISCONTINUED | OUTPATIENT
Start: 2025-07-07 | End: 2025-07-07 | Stop reason: SDUPTHER

## 2025-07-07 RX ORDER — HYDRALAZINE HYDROCHLORIDE 20 MG/ML
10 INJECTION INTRAMUSCULAR; INTRAVENOUS
Status: DISCONTINUED | OUTPATIENT
Start: 2025-07-07 | End: 2025-07-07 | Stop reason: HOSPADM

## 2025-07-07 RX ORDER — DROPERIDOL 2.5 MG/ML
0.62 INJECTION, SOLUTION INTRAMUSCULAR; INTRAVENOUS
Status: DISCONTINUED | OUTPATIENT
Start: 2025-07-07 | End: 2025-07-07 | Stop reason: HOSPADM

## 2025-07-07 RX ORDER — ASPIRIN 81 MG/1
81 TABLET, COATED ORAL 2 TIMES DAILY
Qty: 56 TABLET | Refills: 0 | Status: SHIPPED | OUTPATIENT
Start: 2025-07-07 | End: 2025-08-04

## 2025-07-07 RX ORDER — IPRATROPIUM BROMIDE AND ALBUTEROL SULFATE 2.5; .5 MG/3ML; MG/3ML
1 SOLUTION RESPIRATORY (INHALATION)
Status: DISCONTINUED | OUTPATIENT
Start: 2025-07-07 | End: 2025-07-07 | Stop reason: HOSPADM

## 2025-07-07 RX ORDER — LIDOCAINE HYDROCHLORIDE 20 MG/ML
INJECTION, SOLUTION INTRAVENOUS
Status: DISCONTINUED | OUTPATIENT
Start: 2025-07-07 | End: 2025-07-07 | Stop reason: SDUPTHER

## 2025-07-07 RX ORDER — DIPHENHYDRAMINE HYDROCHLORIDE 50 MG/ML
12.5 INJECTION, SOLUTION INTRAMUSCULAR; INTRAVENOUS
Status: DISCONTINUED | OUTPATIENT
Start: 2025-07-07 | End: 2025-07-07 | Stop reason: HOSPADM

## 2025-07-07 RX ORDER — CEFAZOLIN SODIUM 1 G/3ML
INJECTION, POWDER, FOR SOLUTION INTRAMUSCULAR; INTRAVENOUS
Status: DISCONTINUED | OUTPATIENT
Start: 2025-07-07 | End: 2025-07-07 | Stop reason: SDUPTHER

## 2025-07-07 RX ORDER — PHENYLEPHRINE HCL IN 0.9% NACL 1 MG/10 ML
SYRINGE (ML) INTRAVENOUS
Status: DISCONTINUED | OUTPATIENT
Start: 2025-07-07 | End: 2025-07-07 | Stop reason: SDUPTHER

## 2025-07-07 RX ORDER — MEPERIDINE HYDROCHLORIDE 25 MG/ML
12.5 INJECTION INTRAMUSCULAR; INTRAVENOUS; SUBCUTANEOUS EVERY 5 MIN PRN
Status: DISCONTINUED | OUTPATIENT
Start: 2025-07-07 | End: 2025-07-07 | Stop reason: HOSPADM

## 2025-07-07 RX ORDER — SODIUM CHLORIDE 9 MG/ML
INJECTION, SOLUTION INTRAVENOUS PRN
Status: DISCONTINUED | OUTPATIENT
Start: 2025-07-07 | End: 2025-07-07 | Stop reason: HOSPADM

## 2025-07-07 RX ORDER — DEXAMETHASONE SODIUM PHOSPHATE 10 MG/ML
INJECTION, SOLUTION INTRAMUSCULAR; INTRAVENOUS
Status: DISCONTINUED | OUTPATIENT
Start: 2025-07-07 | End: 2025-07-07 | Stop reason: SDUPTHER

## 2025-07-07 RX ORDER — LABETALOL HYDROCHLORIDE 5 MG/ML
INJECTION, SOLUTION INTRAVENOUS
Status: DISCONTINUED | OUTPATIENT
Start: 2025-07-07 | End: 2025-07-07 | Stop reason: SDUPTHER

## 2025-07-07 RX ORDER — FENTANYL CITRATE 50 UG/ML
INJECTION, SOLUTION INTRAMUSCULAR; INTRAVENOUS
Status: DISCONTINUED | OUTPATIENT
Start: 2025-07-07 | End: 2025-07-07 | Stop reason: SDUPTHER

## 2025-07-07 RX ORDER — PROCHLORPERAZINE EDISYLATE 5 MG/ML
5 INJECTION INTRAMUSCULAR; INTRAVENOUS
Status: COMPLETED | OUTPATIENT
Start: 2025-07-07 | End: 2025-07-07

## 2025-07-07 RX ORDER — OXYCODONE AND ACETAMINOPHEN 5; 325 MG/1; MG/1
1 TABLET ORAL EVERY 6 HOURS PRN
Qty: 28 TABLET | Refills: 0 | Status: SHIPPED | OUTPATIENT
Start: 2025-07-07 | End: 2025-07-14

## 2025-07-07 RX ORDER — ONDANSETRON 2 MG/ML
INJECTION INTRAMUSCULAR; INTRAVENOUS
Status: DISCONTINUED | OUTPATIENT
Start: 2025-07-07 | End: 2025-07-07 | Stop reason: SDUPTHER

## 2025-07-07 RX ORDER — LABETALOL HYDROCHLORIDE 5 MG/ML
10 INJECTION, SOLUTION INTRAVENOUS
Status: DISCONTINUED | OUTPATIENT
Start: 2025-07-07 | End: 2025-07-07 | Stop reason: HOSPADM

## 2025-07-07 RX ORDER — FENTANYL CITRATE 0.05 MG/ML
25 INJECTION, SOLUTION INTRAMUSCULAR; INTRAVENOUS EVERY 5 MIN PRN
Status: COMPLETED | OUTPATIENT
Start: 2025-07-07 | End: 2025-07-07

## 2025-07-07 RX ADMIN — METOPROLOL SUCCINATE 25 MG: 25 TABLET, EXTENDED RELEASE ORAL at 22:27

## 2025-07-07 RX ADMIN — LIDOCAINE HYDROCHLORIDE 40 MG: 20 INJECTION, SOLUTION INTRAVENOUS at 13:37

## 2025-07-07 RX ADMIN — LABETALOL HYDROCHLORIDE 5 MG: 5 INJECTION INTRAVENOUS at 15:32

## 2025-07-07 RX ADMIN — Medication 50 MCG: at 14:13

## 2025-07-07 RX ADMIN — LOSARTAN POTASSIUM 25 MG: 50 TABLET, FILM COATED ORAL at 09:00

## 2025-07-07 RX ADMIN — LABETALOL HYDROCHLORIDE 5 MG: 5 INJECTION INTRAVENOUS at 15:39

## 2025-07-07 RX ADMIN — Medication 10 ML: at 22:28

## 2025-07-07 RX ADMIN — PROCHLORPERAZINE EDISYLATE 5 MG: 5 INJECTION INTRAMUSCULAR; INTRAVENOUS at 15:50

## 2025-07-07 RX ADMIN — HYDRALAZINE HYDROCHLORIDE 25 MG: 25 TABLET ORAL at 09:00

## 2025-07-07 RX ADMIN — FENTANYL CITRATE 25 MCG: 50 INJECTION, SOLUTION INTRAMUSCULAR; INTRAVENOUS at 14:03

## 2025-07-07 RX ADMIN — FENTANYL CITRATE 25 MCG: 0.05 INJECTION, SOLUTION INTRAMUSCULAR; INTRAVENOUS at 16:24

## 2025-07-07 RX ADMIN — HYDRALAZINE HYDROCHLORIDE 25 MG: 25 TABLET ORAL at 22:27

## 2025-07-07 RX ADMIN — CEFAZOLIN SODIUM 2 G: 1 POWDER, FOR SOLUTION INTRAMUSCULAR; INTRAVENOUS at 13:48

## 2025-07-07 RX ADMIN — FENTANYL CITRATE 100 MCG: 50 INJECTION, SOLUTION INTRAMUSCULAR; INTRAVENOUS at 13:33

## 2025-07-07 RX ADMIN — ACETAMINOPHEN 650 MG: 325 TABLET ORAL at 06:53

## 2025-07-07 RX ADMIN — PROPOFOL 50 MG: 10 INJECTION, EMULSION INTRAVENOUS at 13:37

## 2025-07-07 RX ADMIN — LABETALOL HYDROCHLORIDE 5 MG: 5 INJECTION INTRAVENOUS at 15:29

## 2025-07-07 RX ADMIN — FENTANYL CITRATE 25 MCG: 50 INJECTION, SOLUTION INTRAMUSCULAR; INTRAVENOUS at 14:25

## 2025-07-07 RX ADMIN — DIGOXIN 125 MCG: 125 TABLET ORAL at 09:00

## 2025-07-07 RX ADMIN — DEXAMETHASONE SODIUM PHOSPHATE 8 MG: 10 INJECTION, SOLUTION INTRAMUSCULAR; INTRAVENOUS at 13:37

## 2025-07-07 RX ADMIN — FENTANYL CITRATE 25 MCG: 50 INJECTION, SOLUTION INTRAMUSCULAR; INTRAVENOUS at 15:00

## 2025-07-07 RX ADMIN — KETOROLAC TROMETHAMINE 30 MG: 30 INJECTION, SOLUTION INTRAMUSCULAR at 15:20

## 2025-07-07 RX ADMIN — SODIUM CHLORIDE: 0.9 INJECTION, SOLUTION INTRAVENOUS at 06:52

## 2025-07-07 RX ADMIN — ONDANSETRON 4 MG: 2 INJECTION, SOLUTION INTRAMUSCULAR; INTRAVENOUS at 13:34

## 2025-07-07 RX ADMIN — METOPROLOL SUCCINATE 25 MG: 25 TABLET, EXTENDED RELEASE ORAL at 09:00

## 2025-07-07 RX ADMIN — SODIUM CHLORIDE, POTASSIUM CHLORIDE, SODIUM LACTATE AND CALCIUM CHLORIDE: 600; 310; 30; 20 INJECTION, SOLUTION INTRAVENOUS at 13:26

## 2025-07-07 RX ADMIN — Medication 50 MCG: at 14:18

## 2025-07-07 RX ADMIN — FENTANYL CITRATE 25 MCG: 0.05 INJECTION, SOLUTION INTRAMUSCULAR; INTRAVENOUS at 16:09

## 2025-07-07 RX ADMIN — FENTANYL CITRATE 25 MCG: 50 INJECTION, SOLUTION INTRAMUSCULAR; INTRAVENOUS at 14:30

## 2025-07-07 RX ADMIN — Medication 50 MCG: at 14:45

## 2025-07-07 ASSESSMENT — PAIN DESCRIPTION - ORIENTATION
ORIENTATION: RIGHT

## 2025-07-07 ASSESSMENT — PAIN SCALES - GENERAL
PAINLEVEL_OUTOF10: 4
PAINLEVEL_OUTOF10: 8
PAINLEVEL_OUTOF10: 6

## 2025-07-07 ASSESSMENT — PAIN - FUNCTIONAL ASSESSMENT
PAIN_FUNCTIONAL_ASSESSMENT: ADULT NONVERBAL PAIN SCALE (NPVS)
PAIN_FUNCTIONAL_ASSESSMENT: PREVENTS OR INTERFERES SOME ACTIVE ACTIVITIES AND ADLS

## 2025-07-07 ASSESSMENT — PAIN DESCRIPTION - LOCATION
LOCATION: KNEE

## 2025-07-07 ASSESSMENT — PAIN DESCRIPTION - DESCRIPTORS
DESCRIPTORS: ACHING;PRESSURE
DESCRIPTORS: ACHING;DISCOMFORT;SORE
DESCRIPTORS: THROBBING;ACHING;DISCOMFORT
DESCRIPTORS: ACHING

## 2025-07-07 NOTE — DISCHARGE INSTRUCTIONS
Ok to weight as tolerated right leg in knee immobilizer only.  Knee immobilizer needs to stay centered around the knee.  No range of motion of the knee  Elevate/ice leg as needed  Keep Mepilex dressing on until follow up

## 2025-07-07 NOTE — PLAN OF CARE
Problem: Safety - Adult  Goal: Free from fall injury  Outcome: Progressing     Problem: Chronic Conditions and Co-morbidities  Goal: Patient's chronic conditions and co-morbidity symptoms are monitored and maintained or improved  Outcome: Progressing  Flowsheets (Taken 7/7/2025 0800)  Care Plan - Patient's Chronic Conditions and Co-Morbidity Symptoms are Monitored and Maintained or Improved: Monitor and assess patient's chronic conditions and comorbid symptoms for stability, deterioration, or improvement     Problem: Discharge Planning  Goal: Discharge to home or other facility with appropriate resources  Outcome: Progressing  Flowsheets (Taken 7/7/2025 0800)  Discharge to home or other facility with appropriate resources: Identify barriers to discharge with patient and caregiver     Problem: Skin/Tissue Integrity  Goal: Skin integrity remains intact  Description: 1.  Monitor for areas of redness and/or skin breakdown  2.  Assess vascular access sites hourly  3.  Every 4-6 hours minimum:  Change oxygen saturation probe site  4.  Every 4-6 hours:  If on nasal continuous positive airway pressure, respiratory therapy assess nares and determine need for appliance change or resting period  Outcome: Progressing     Problem: ABCDS Injury Assessment  Goal: Absence of physical injury  Outcome: Progressing     Problem: Pain  Goal: Verbalizes/displays adequate comfort level or baseline comfort level  Outcome: Progressing

## 2025-07-07 NOTE — CARE COORDINATION
Patient currently out of room at Surgery, per IDR, plan for ORIF today.  Will need PT and OT after Surgery for DC planning.  Will follow up and see patient as available.

## 2025-07-07 NOTE — OP NOTE
Operative Note      Patient: Caitlyn Mitchell  YOB: 1940  MRN: [id number]    Date of Procedure: 7/7/2025    Pre-Op Diagnosis Codes:      * Closed nondisplaced fracture of right patella, unspecified fracture morphology, initial encounter [S82.001A]    Post-Op Diagnosis: Same       Procedure(s):  RIGHT PATELLA OPEN REDUCTION INTERNAL FIXATION    Surgeon(s):  Julien Strickland MD    Assistant:   Resident: Vincenzo Cummings DO; Justin Olivier DO; Peng Babb DO    Anesthesia: General    Estimated Blood Loss (mL): less than 50     Complications: None    Specimens:   * No specimens in log *    Implants:  Implant Name Type Inv. Item Serial No.  Lot No. LRB No. Used Action   SCREW BNE L30MM DIA4MM S STL OPAL SHT 1/3 THRD SM HEX SOCK - [id number]  SCREW BNE L30MM DIA4MM S STL OPAL SHT 1/3 THRD SM HEX SOCK  [employer]  Right 1 Implanted   SCREW BNE L34MM DIA4MM S STL OPAL SHT 1/3 THRD SM HEX SOCK - [id number]  SCREW BNE L34MM DIA4MM S STL OPAL SHT 1/3 THRD SM HEX SOCK  [employer]  Right 1 Implanted   CABLE SURG L750MM DIA1MM S STL SMOOTH W/ CRMP - [id number]  CABLE SURG L750MM DIA1MM S STL SMOOTH W/ CRMP  [employer] [id number] Right 1 Implanted         Drains:   External Urinary Catheter (Active)   Placement Replaced 07/07/25 0943   Catheter Care Catheter/Wick replaced 07/07/25 0943   Perineal Care Yes 07/07/25 0943       Findings:  Infection Present At Time Of Surgery (PATOS) (choose all levels that have infection present):  No infection present      Detailed Description of Procedure:   Preoperative course  Caitlyn is a 85-year-old female who sustained an unstable right patella fracture following a ground-level fall from standing height with extensor mechanism disruption.  She elected to undergo surgical fixation.  Patient was evaluated in the preoperative area.  The right lower extremity was marked as the surgical site.  She was next evaluated by anesthesia,

## 2025-07-07 NOTE — ANESTHESIA PRE PROCEDURE
Department of Anesthesiology  Preprocedure Note       Name:  Caitlyn Mitchell   Age:  85 y.o.  :  1940                                          MRN:  [id number]         Date:  2025      Surgeon: Surgeon(s):  Julien Strickland MD    Procedure: Procedure(s):  RIGHT PATELLA OPEN REDUCTION INTERNAL FIXATION    Medications prior to admission:   Prior to Admission medications    Medication Sig Start Date End Date Taking? Authorizing Provider   metoprolol succinate (TOPROL XL) 25 MG extended release tablet Take 1 tablet by mouth daily   Yes Provider, MD Destin   losartan (COZAAR) 25 MG tablet Take 1 tablet by mouth daily   Yes Provider, MD Destin   Multiple Vitamins-Minerals (PRESERVISION AREDS) CAPS Take 1 capsule by mouth 2 times daily   Yes Provider, MD Destin   hydrALAZINE (APRESOLINE) 25 MG tablet Take 1 tablet by mouth 2 times daily   Yes Provider, MD Destin   digoxin (LANOXIN) 125 MCG tablet Take 1 tablet by mouth every 48 hours 24  Yes Kilo Briceño DO   rivaroxaban (XARELTO) 15 MG TABS tablet Take 1 tablet by mouth daily (with breakfast) 24  Yes Ciara Willams DO   metoprolol succinate (TOPROL XL) 25 MG extended release tablet Take 1 tablet by mouth in the morning and at bedtime New increased frequency 2/19/25 3/21/25  Eyal Bird MD       Current medications:    Current Facility-Administered Medications   Medication Dose Route Frequency Provider Last Rate Last Admin   • digoxin (LANOXIN) tablet 125 mcg  125 mcg Oral Q48H Hric, Kilo, DO   125 mcg at 25   • hydrALAZINE (APRESOLINE) tablet 25 mg  25 mg Oral BID Hric, Kilo, DO   25 mg at 25   • losartan (COZAAR) tablet 25 mg  25 mg Oral Daily Hric, Kilo, DO   25 mg at 25   • metoprolol succinate (TOPROL XL) extended release tablet 25 mg  25 mg Oral BID Hric, Kilo, DO   25 mg at 25   • sodium chloride flush 0.9 % injection 5-40 mL  5-40 mL IntraVENous 2 times

## 2025-07-07 NOTE — ANESTHESIA POSTPROCEDURE EVALUATION
Department of Anesthesiology  Postprocedure Note    Patient: Caitlyn Mitchell  MRN: [id number]  YOB: 1940  Date of evaluation: 7/7/2025    Procedure Summary       Date: 07/07/25 Room / Location: [address]    Anesthesia Start: 1326 Anesthesia Stop: 1544    Procedure: RIGHT PATELLA OPEN REDUCTION INTERNAL FIXATION (Right: Knee) Diagnosis:       Closed nondisplaced fracture of right patella, unspecified fracture morphology, initial encounter      (Closed nondisplaced fracture of right patella, unspecified fracture morphology, initial encounter [S82.001A])    Surgeons: Julien Strickland MD Responsible Provider: Fercho Trent DO    Anesthesia Type: general ASA Status: 3            Anesthesia Type: No value filed.    Asim Phase I: Asim Score: 8    Asim Phase II:      Anesthesia Post Evaluation    Patient location during evaluation: PACU  Patient participation: complete - patient participated  Level of consciousness: awake and alert  Pain score: 0  Airway patency: patent  Nausea & Vomiting: no nausea and no vomiting  Cardiovascular status: blood pressure returned to baseline and hemodynamically stable  Respiratory status: acceptable  Hydration status: stable  Pain management: adequate    No notable events documented.

## 2025-07-08 ENCOUNTER — APPOINTMENT (OUTPATIENT)
Dept: CT IMAGING | Age: 85
DRG: 516 | End: 2025-07-08
Payer: MEDICARE

## 2025-07-08 LAB
ANION GAP SERPL CALCULATED.3IONS-SCNC: 12 MMOL/L (ref 7–16)
BASOPHILS # BLD: 0.02 K/UL (ref 0–0.2)
BASOPHILS NFR BLD: 0 % (ref 0–2)
BUN SERPL-MCNC: 38 MG/DL (ref 8–23)
CALCIUM SERPL-MCNC: 8.3 MG/DL (ref 8.8–10.2)
CHLORIDE SERPL-SCNC: 106 MMOL/L (ref 98–107)
CO2 SERPL-SCNC: 19 MMOL/L (ref 22–29)
CREAT SERPL-MCNC: 1.3 MG/DL (ref 0.5–1)
EKG ATRIAL RATE: 86 BPM
EKG Q-T INTERVAL: 364 MS
EKG QRS DURATION: 66 MS
EKG QTC CALCULATION (BAZETT): 427 MS
EKG R AXIS: -34 DEGREES
EKG T AXIS: 187 DEGREES
EKG VENTRICULAR RATE: 83 BPM
EOSINOPHIL # BLD: 0.01 K/UL (ref 0.05–0.5)
EOSINOPHILS RELATIVE PERCENT: 0 % (ref 0–6)
ERYTHROCYTE [DISTWIDTH] IN BLOOD BY AUTOMATED COUNT: 16.4 % (ref 11.5–15)
GFR, ESTIMATED: 41 ML/MIN/1.73M2
GLUCOSE SERPL-MCNC: 137 MG/DL (ref 74–99)
HCT VFR BLD AUTO: 36.5 % (ref 34–48)
HGB BLD-MCNC: 11.8 G/DL (ref 11.5–15.5)
IMM GRANULOCYTES # BLD AUTO: 0.07 K/UL (ref 0–0.58)
IMM GRANULOCYTES NFR BLD: 1 % (ref 0–5)
LYMPHOCYTES NFR BLD: 0.7 K/UL (ref 1.5–4)
LYMPHOCYTES RELATIVE PERCENT: 5 % (ref 20–42)
MAGNESIUM SERPL-MCNC: 1.9 MG/DL (ref 1.6–2.4)
MCH RBC QN AUTO: 29.9 PG (ref 26–35)
MCHC RBC AUTO-ENTMCNC: 32.3 G/DL (ref 32–34.5)
MCV RBC AUTO: 92.6 FL (ref 80–99.9)
MONOCYTES NFR BLD: 0.9 K/UL (ref 0.1–0.95)
MONOCYTES NFR BLD: 7 % (ref 2–12)
NEUTROPHILS NFR BLD: 87 % (ref 43–80)
NEUTS SEG NFR BLD: 11.48 K/UL (ref 1.8–7.3)
PHOSPHATE SERPL-MCNC: 2.9 MG/DL (ref 2.5–4.5)
PLATELET # BLD AUTO: 187 K/UL (ref 130–450)
PMV BLD AUTO: 10.4 FL (ref 7–12)
POTASSIUM SERPL-SCNC: 4.4 MMOL/L (ref 3.5–5.1)
RBC # BLD AUTO: 3.94 M/UL (ref 3.5–5.5)
SODIUM SERPL-SCNC: 137 MMOL/L (ref 136–145)
WBC OTHER # BLD: 13.2 K/UL (ref 4.5–11.5)

## 2025-07-08 PROCEDURE — 2580000003 HC RX 258: Performed by: INTERNAL MEDICINE

## 2025-07-08 PROCEDURE — 97110 THERAPEUTIC EXERCISES: CPT | Performed by: PHYSICAL THERAPIST

## 2025-07-08 PROCEDURE — 84100 ASSAY OF PHOSPHORUS: CPT

## 2025-07-08 PROCEDURE — 2060000000 HC ICU INTERMEDIATE R&B

## 2025-07-08 PROCEDURE — 36415 COLL VENOUS BLD VENIPUNCTURE: CPT

## 2025-07-08 PROCEDURE — 97165 OT EVAL LOW COMPLEX 30 MIN: CPT

## 2025-07-08 PROCEDURE — 2500000003 HC RX 250 WO HCPCS

## 2025-07-08 PROCEDURE — 6370000000 HC RX 637 (ALT 250 FOR IP)

## 2025-07-08 PROCEDURE — 6370000000 HC RX 637 (ALT 250 FOR IP): Performed by: INTERNAL MEDICINE

## 2025-07-08 PROCEDURE — 6360000002 HC RX W HCPCS

## 2025-07-08 PROCEDURE — 2500000003 HC RX 250 WO HCPCS: Performed by: INTERNAL MEDICINE

## 2025-07-08 PROCEDURE — 97535 SELF CARE MNGMENT TRAINING: CPT

## 2025-07-08 PROCEDURE — 80048 BASIC METABOLIC PNL TOTAL CA: CPT

## 2025-07-08 PROCEDURE — 93005 ELECTROCARDIOGRAM TRACING: CPT | Performed by: INTERNAL MEDICINE

## 2025-07-08 PROCEDURE — 83735 ASSAY OF MAGNESIUM: CPT

## 2025-07-08 PROCEDURE — 85025 COMPLETE CBC W/AUTO DIFF WBC: CPT

## 2025-07-08 PROCEDURE — 97161 PT EVAL LOW COMPLEX 20 MIN: CPT | Performed by: PHYSICAL THERAPIST

## 2025-07-08 PROCEDURE — 70450 CT HEAD/BRAIN W/O DYE: CPT

## 2025-07-08 RX ORDER — SODIUM CHLORIDE 9 MG/ML
INJECTION, SOLUTION INTRAVENOUS CONTINUOUS
Status: DISCONTINUED | OUTPATIENT
Start: 2025-07-08 | End: 2025-07-10

## 2025-07-08 RX ORDER — ASPIRIN 81 MG/1
81 TABLET ORAL 2 TIMES DAILY
Status: DISCONTINUED | OUTPATIENT
Start: 2025-07-08 | End: 2025-07-08

## 2025-07-08 RX ADMIN — METOPROLOL SUCCINATE 25 MG: 25 TABLET, EXTENDED RELEASE ORAL at 21:47

## 2025-07-08 RX ADMIN — ACETAMINOPHEN 650 MG: 325 TABLET ORAL at 21:47

## 2025-07-08 RX ADMIN — WATER 1000 MG: 1 INJECTION INTRAMUSCULAR; INTRAVENOUS; SUBCUTANEOUS at 13:15

## 2025-07-08 RX ADMIN — Medication 10 ML: at 21:49

## 2025-07-08 RX ADMIN — HYDRALAZINE HYDROCHLORIDE 25 MG: 25 TABLET ORAL at 21:47

## 2025-07-08 RX ADMIN — METOPROLOL SUCCINATE 25 MG: 25 TABLET, EXTENDED RELEASE ORAL at 07:47

## 2025-07-08 RX ADMIN — WATER 1000 MG: 1 INJECTION INTRAMUSCULAR; INTRAVENOUS; SUBCUTANEOUS at 05:44

## 2025-07-08 RX ADMIN — ASPIRIN 81 MG: 81 TABLET, COATED ORAL at 07:47

## 2025-07-08 RX ADMIN — Medication 10 ML: at 07:48

## 2025-07-08 RX ADMIN — SODIUM CHLORIDE: 0.9 INJECTION, SOLUTION INTRAVENOUS at 15:55

## 2025-07-08 RX ADMIN — ACETAMINOPHEN 650 MG: 325 TABLET ORAL at 13:15

## 2025-07-08 RX ADMIN — LOSARTAN POTASSIUM 25 MG: 50 TABLET, FILM COATED ORAL at 07:47

## 2025-07-08 RX ADMIN — HYDRALAZINE HYDROCHLORIDE 25 MG: 25 TABLET ORAL at 07:48

## 2025-07-08 ASSESSMENT — PAIN DESCRIPTION - LOCATION
LOCATION: KNEE;NECK
LOCATION: KNEE

## 2025-07-08 ASSESSMENT — PAIN DESCRIPTION - DESCRIPTORS
DESCRIPTORS: ACHING;PRESSURE;SORE
DESCRIPTORS: ACHING

## 2025-07-08 ASSESSMENT — PAIN DESCRIPTION - ORIENTATION: ORIENTATION: RIGHT

## 2025-07-08 ASSESSMENT — PAIN SCALES - GENERAL
PAINLEVEL_OUTOF10: 6
PAINLEVEL_OUTOF10: 0
PAINLEVEL_OUTOF10: 4

## 2025-07-08 ASSESSMENT — PAIN SCALES - WONG BAKER: WONGBAKER_NUMERICALRESPONSE: NO HURT

## 2025-07-08 ASSESSMENT — PAIN - FUNCTIONAL ASSESSMENT: PAIN_FUNCTIONAL_ASSESSMENT: PREVENTS OR INTERFERES SOME ACTIVE ACTIVITIES AND ADLS

## 2025-07-08 NOTE — PLAN OF CARE
Problem: Safety - Adult  Goal: Free from fall injury  Outcome: Progressing     Problem: Chronic Conditions and Co-morbidities  Goal: Patient's chronic conditions and co-morbidity symptoms are monitored and maintained or improved  Outcome: Progressing  Flowsheets (Taken 7/8/2025 0950)  Care Plan - Patient's Chronic Conditions and Co-Morbidity Symptoms are Monitored and Maintained or Improved: Monitor and assess patient's chronic conditions and comorbid symptoms for stability, deterioration, or improvement     Problem: Discharge Planning  Goal: Discharge to home or other facility with appropriate resources  Outcome: Progressing  Flowsheets (Taken 7/8/2025 0950)  Discharge to home or other facility with appropriate resources: Identify barriers to discharge with patient and caregiver     Problem: Skin/Tissue Integrity  Goal: Skin integrity remains intact  Description: 1.  Monitor for areas of redness and/or skin breakdown  2.  Assess vascular access sites hourly  3.  Every 4-6 hours minimum:  Change oxygen saturation probe site  4.  Every 4-6 hours:  If on nasal continuous positive airway pressure, respiratory therapy assess nares and determine need for appliance change or resting period  Outcome: Progressing  Flowsheets (Taken 7/8/2025 1300)  Skin Integrity Remains Intact: Monitor for areas of redness and/or skin breakdown     Problem: ABCDS Injury Assessment  Goal: Absence of physical injury  Outcome: Progressing     Problem: Pain  Goal: Verbalizes/displays adequate comfort level or baseline comfort level  Outcome: Progressing  Flowsheets (Taken 7/8/2025 1100 by Arabella Lamas RN)  Verbalizes/displays adequate comfort level or baseline comfort level:   Encourage patient to monitor pain and request assistance   Assess pain using appropriate pain scale   Administer analgesics based on type and severity of pain and evaluate response   Implement non-pharmacological measures as appropriate and evaluate response   Notify
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
## 2025-07-08 NOTE — PLAN OF CARE
Problem: Chronic Conditions and Co-morbidities  Goal: Patient's chronic conditions and co-morbidity symptoms are monitored and maintained or improved  Recent Flowsheet Documentation  Taken 7/8/2025 0950 by Danni Yuen RN  Care Plan - Patient's Chronic Conditions and Co-Morbidity Symptoms are Monitored and Maintained or Improved: Monitor and assess patient's chronic conditions and comorbid symptoms for stability, deterioration, or improvement     Problem: Discharge Planning  Goal: Discharge to home or other facility with appropriate resources  Recent Flowsheet Documentation  Taken 7/8/2025 0950 by Danni Yuen RN  Discharge to home or other facility with appropriate resources: Identify barriers to discharge with patient and caregiver     Problem: Skin/Tissue Integrity  Goal: Skin integrity remains intact  Description: 1.  Monitor for areas of redness and/or skin breakdown  2.  Assess vascular access sites hourly  3.  Every 4-6 hours minimum:  Change oxygen saturation probe site  4.  Every 4-6 hours:  If on nasal continuous positive airway pressure, respiratory therapy assess nares and determine need for appliance change or resting period  Recent Flowsheet Documentation  Taken 7/8/2025 1300 by Danni Yuen RN  Skin Integrity Remains Intact: Monitor for areas of redness and/or skin breakdown     Problem: Pain  Goal: Verbalizes/displays adequate comfort level or baseline comfort level  Recent Flowsheet Documentation  Taken 7/8/2025 1100 by Arabella Lamas RN  Verbalizes/displays adequate comfort level or baseline comfort level:   Encourage patient to monitor pain and request assistance   Assess pain using appropriate pain scale   Administer analgesics based on type and severity of pain and evaluate response   Implement non-pharmacological measures as appropriate and evaluate response   Notify Licensed Independent Practitioner if interventions unsuccessful or patient reports new pain

## 2025-07-08 NOTE — CARE COORDINATION
SW met with patient in room.  She is alert, oriented, but very [place].  She lives home alone, does not have family local she states but a few friends that are very supportive and visit.  She states she wants to go to rehab at [facility], list provided, but patient states she wanted to see [facility] too as there is a certain facility she is looking for.  List provided, she chose [facility], would not provide any other choices at this time.  Referral placed in [facility], awaiting response.  Need therapy evals.  If accepted at rehab, no precert needed however will need qualifying 3 day stay, HENS and ZACK at [facility].  SW following.    Addendum:  Patient has been accepted by [facility], bed available tomorrow.  NO precert will be needed.  Need HENS and ZACK at [facility].

## 2025-07-09 ENCOUNTER — APPOINTMENT (OUTPATIENT)
Dept: GENERAL RADIOLOGY | Age: 85
DRG: 516 | End: 2025-07-09
Payer: MEDICARE

## 2025-07-09 LAB
ALBUMIN SERPL-MCNC: 3.1 G/DL (ref 3.5–5.2)
ALP SERPL-CCNC: 99 U/L (ref 35–104)
ALT SERPL-CCNC: 7 U/L (ref 0–35)
ANION GAP SERPL CALCULATED.3IONS-SCNC: 11 MMOL/L (ref 7–16)
ANION GAP SERPL CALCULATED.3IONS-SCNC: 9 MMOL/L (ref 7–16)
AST SERPL-CCNC: 14 U/L (ref 0–35)
BASOPHILS # BLD: 0.04 K/UL (ref 0–0.2)
BASOPHILS NFR BLD: 1 % (ref 0–2)
BILIRUB DIRECT SERPL-MCNC: 0.1 MG/DL (ref 0–0.2)
BILIRUB INDIRECT SERPL-MCNC: 0.1 MG/DL (ref 0–1)
BILIRUB SERPL-MCNC: 0.3 MG/DL (ref 0–1.2)
BUN SERPL-MCNC: 42 MG/DL (ref 8–23)
BUN SERPL-MCNC: 48 MG/DL (ref 8–23)
CALCIUM SERPL-MCNC: 8.3 MG/DL (ref 8.8–10.2)
CALCIUM SERPL-MCNC: 8.6 MG/DL (ref 8.8–10.2)
CHLORIDE SERPL-SCNC: 109 MMOL/L (ref 98–107)
CHLORIDE SERPL-SCNC: 109 MMOL/L (ref 98–107)
CO2 SERPL-SCNC: 19 MMOL/L (ref 22–29)
CO2 SERPL-SCNC: 20 MMOL/L (ref 22–29)
CREAT SERPL-MCNC: 1.3 MG/DL (ref 0.5–1)
CREAT SERPL-MCNC: 1.4 MG/DL (ref 0.5–1)
EOSINOPHIL # BLD: 0.14 K/UL (ref 0.05–0.5)
EOSINOPHILS RELATIVE PERCENT: 2 % (ref 0–6)
ERYTHROCYTE [DISTWIDTH] IN BLOOD BY AUTOMATED COUNT: 16.7 % (ref 11.5–15)
GFR, ESTIMATED: 38 ML/MIN/1.73M2
GFR, ESTIMATED: 42 ML/MIN/1.73M2
GLUCOSE SERPL-MCNC: 131 MG/DL (ref 74–99)
GLUCOSE SERPL-MCNC: 92 MG/DL (ref 74–99)
HCT VFR BLD AUTO: 34.9 % (ref 34–48)
HGB BLD-MCNC: 11.2 G/DL (ref 11.5–15.5)
IMM GRANULOCYTES # BLD AUTO: 0.04 K/UL (ref 0–0.58)
IMM GRANULOCYTES NFR BLD: 1 % (ref 0–5)
LYMPHOCYTES NFR BLD: 1.32 K/UL (ref 1.5–4)
LYMPHOCYTES RELATIVE PERCENT: 15 % (ref 20–42)
MAGNESIUM SERPL-MCNC: 1.9 MG/DL (ref 1.6–2.4)
MAGNESIUM SERPL-MCNC: 2 MG/DL (ref 1.6–2.4)
MCH RBC QN AUTO: 30.1 PG (ref 26–35)
MCHC RBC AUTO-ENTMCNC: 32.1 G/DL (ref 32–34.5)
MCV RBC AUTO: 93.8 FL (ref 80–99.9)
MONOCYTES NFR BLD: 0.79 K/UL (ref 0.1–0.95)
MONOCYTES NFR BLD: 9 % (ref 2–12)
NEUTROPHILS NFR BLD: 73 % (ref 43–80)
NEUTS SEG NFR BLD: 6.26 K/UL (ref 1.8–7.3)
PHOSPHATE SERPL-MCNC: 2.6 MG/DL (ref 2.5–4.5)
PLATELET # BLD AUTO: 164 K/UL (ref 130–450)
PMV BLD AUTO: 10.7 FL (ref 7–12)
POTASSIUM SERPL-SCNC: 4.2 MMOL/L (ref 3.5–5.1)
POTASSIUM SERPL-SCNC: 4.4 MMOL/L (ref 3.5–5.1)
PROT SERPL-MCNC: 5.3 G/DL (ref 6.4–8.3)
RBC # BLD AUTO: 3.72 M/UL (ref 3.5–5.5)
SODIUM SERPL-SCNC: 138 MMOL/L (ref 136–145)
SODIUM SERPL-SCNC: 140 MMOL/L (ref 136–145)
WBC OTHER # BLD: 8.6 K/UL (ref 4.5–11.5)

## 2025-07-09 PROCEDURE — 6370000000 HC RX 637 (ALT 250 FOR IP): Performed by: INTERNAL MEDICINE

## 2025-07-09 PROCEDURE — 2500000003 HC RX 250 WO HCPCS: Performed by: INTERNAL MEDICINE

## 2025-07-09 PROCEDURE — 84100 ASSAY OF PHOSPHORUS: CPT

## 2025-07-09 PROCEDURE — 80048 BASIC METABOLIC PNL TOTAL CA: CPT

## 2025-07-09 PROCEDURE — 80053 COMPREHEN METABOLIC PANEL: CPT

## 2025-07-09 PROCEDURE — 2580000003 HC RX 258: Performed by: INTERNAL MEDICINE

## 2025-07-09 PROCEDURE — 83735 ASSAY OF MAGNESIUM: CPT

## 2025-07-09 PROCEDURE — 2060000000 HC ICU INTERMEDIATE R&B

## 2025-07-09 PROCEDURE — 82248 BILIRUBIN DIRECT: CPT

## 2025-07-09 PROCEDURE — 36415 COLL VENOUS BLD VENIPUNCTURE: CPT

## 2025-07-09 PROCEDURE — 85025 COMPLETE CBC W/AUTO DIFF WBC: CPT

## 2025-07-09 PROCEDURE — 97530 THERAPEUTIC ACTIVITIES: CPT | Performed by: PHYSICAL THERAPIST

## 2025-07-09 PROCEDURE — 71100 X-RAY EXAM RIBS UNI 2 VIEWS: CPT

## 2025-07-09 RX ADMIN — DIGOXIN 125 MCG: 125 TABLET ORAL at 08:08

## 2025-07-09 RX ADMIN — METOPROLOL SUCCINATE 25 MG: 25 TABLET, EXTENDED RELEASE ORAL at 23:32

## 2025-07-09 RX ADMIN — HYDRALAZINE HYDROCHLORIDE 25 MG: 25 TABLET ORAL at 23:32

## 2025-07-09 RX ADMIN — RIVAROXABAN 15 MG: 15 TABLET, FILM COATED ORAL at 17:08

## 2025-07-09 RX ADMIN — LOSARTAN POTASSIUM 25 MG: 50 TABLET, FILM COATED ORAL at 08:08

## 2025-07-09 RX ADMIN — METOPROLOL SUCCINATE 25 MG: 25 TABLET, EXTENDED RELEASE ORAL at 08:08

## 2025-07-09 RX ADMIN — Medication 10 ML: at 08:08

## 2025-07-09 RX ADMIN — SODIUM CHLORIDE: 0.9 INJECTION, SOLUTION INTRAVENOUS at 15:44

## 2025-07-09 RX ADMIN — HYDRALAZINE HYDROCHLORIDE 25 MG: 25 TABLET ORAL at 08:19

## 2025-07-09 NOTE — CARE COORDINATION
Plan for patient to DC to [facility] they have accepted and is following.  NO PRECERT needed for DC.  HENS done, will need ZACK signed at DC.  SW following.

## 2025-07-10 VITALS
SYSTOLIC BLOOD PRESSURE: 158 MMHG | TEMPERATURE: 98.1 F | OXYGEN SATURATION: 97 % | WEIGHT: 142.9 LBS | DIASTOLIC BLOOD PRESSURE: 89 MMHG | HEIGHT: 59 IN | BODY MASS INDEX: 28.81 KG/M2 | HEART RATE: 84 BPM | RESPIRATION RATE: 17 BRPM

## 2025-07-10 LAB
ANION GAP SERPL CALCULATED.3IONS-SCNC: 8 MMOL/L (ref 7–16)
BASOPHILS # BLD: 0.03 K/UL (ref 0–0.2)
BASOPHILS NFR BLD: 0 % (ref 0–2)
BNP SERPL-MCNC: 3576 PG/ML (ref 0–450)
BUN SERPL-MCNC: 33 MG/DL (ref 8–23)
CALCIUM SERPL-MCNC: 8.3 MG/DL (ref 8.8–10.2)
CHLORIDE SERPL-SCNC: 112 MMOL/L (ref 98–107)
CHLORIDE UR-SCNC: 56 MMOL/L
CO2 SERPL-SCNC: 19 MMOL/L (ref 22–29)
CREAT SERPL-MCNC: 1 MG/DL (ref 0.5–1)
CREAT UR-MCNC: 106 MG/DL (ref 29–226)
CREAT UR-MCNC: 107 MG/DL (ref 29–226)
DATE LAST DOSE: NORMAL
DIGOXIN DOSE TIME: NORMAL
DIGOXIN DOSE: NORMAL MG
DIGOXIN SERPL-MCNC: 1 NG/ML (ref 0.8–2)
EOSINOPHIL # BLD: 0.2 K/UL (ref 0.05–0.5)
EOSINOPHILS RELATIVE PERCENT: 3 % (ref 0–6)
ERYTHROCYTE [DISTWIDTH] IN BLOOD BY AUTOMATED COUNT: 16.7 % (ref 11.5–15)
GFR, ESTIMATED: 56 ML/MIN/1.73M2
GLUCOSE SERPL-MCNC: 94 MG/DL (ref 74–99)
HCT VFR BLD AUTO: 34.2 % (ref 34–48)
HGB BLD-MCNC: 10.7 G/DL (ref 11.5–15.5)
IMM GRANULOCYTES # BLD AUTO: 0.05 K/UL (ref 0–0.58)
IMM GRANULOCYTES NFR BLD: 1 % (ref 0–5)
LYMPHOCYTES NFR BLD: 1.3 K/UL (ref 1.5–4)
LYMPHOCYTES RELATIVE PERCENT: 18 % (ref 20–42)
MCH RBC QN AUTO: 28.9 PG (ref 26–35)
MCHC RBC AUTO-ENTMCNC: 31.3 G/DL (ref 32–34.5)
MCV RBC AUTO: 92.4 FL (ref 80–99.9)
MONOCYTES NFR BLD: 0.74 K/UL (ref 0.1–0.95)
MONOCYTES NFR BLD: 10 % (ref 2–12)
NEUTROPHILS NFR BLD: 68 % (ref 43–80)
NEUTS SEG NFR BLD: 5.02 K/UL (ref 1.8–7.3)
OSMOLALITY UR: 694 MOSM/KG (ref 300–900)
PLATELET # BLD AUTO: 164 K/UL (ref 130–450)
PMV BLD AUTO: 10.4 FL (ref 7–12)
POTASSIUM SERPL-SCNC: 4.5 MMOL/L (ref 3.5–5.1)
RBC # BLD AUTO: 3.7 M/UL (ref 3.5–5.5)
SODIUM SERPL-SCNC: 139 MMOL/L (ref 136–145)
SODIUM UR-SCNC: 52 MMOL/L
TOTAL PROTEIN, URINE: 15 MG/DL (ref 0–12)
URINE TOTAL PROTEIN CREATININE RATIO: 0.14 (ref 0–0.2)
WBC OTHER # BLD: 7.3 K/UL (ref 4.5–11.5)

## 2025-07-10 PROCEDURE — 80048 BASIC METABOLIC PNL TOTAL CA: CPT

## 2025-07-10 PROCEDURE — 2500000003 HC RX 250 WO HCPCS: Performed by: INTERNAL MEDICINE

## 2025-07-10 PROCEDURE — 83880 ASSAY OF NATRIURETIC PEPTIDE: CPT

## 2025-07-10 PROCEDURE — 2580000003 HC RX 258: Performed by: INTERNAL MEDICINE

## 2025-07-10 PROCEDURE — 84156 ASSAY OF PROTEIN URINE: CPT

## 2025-07-10 PROCEDURE — 99239 HOSP IP/OBS DSCHRG MGMT >30: CPT | Performed by: INTERNAL MEDICINE

## 2025-07-10 PROCEDURE — 6370000000 HC RX 637 (ALT 250 FOR IP): Performed by: INTERNAL MEDICINE

## 2025-07-10 PROCEDURE — 36415 COLL VENOUS BLD VENIPUNCTURE: CPT

## 2025-07-10 PROCEDURE — 83935 ASSAY OF URINE OSMOLALITY: CPT

## 2025-07-10 PROCEDURE — 97530 THERAPEUTIC ACTIVITIES: CPT

## 2025-07-10 PROCEDURE — 97535 SELF CARE MNGMENT TRAINING: CPT

## 2025-07-10 PROCEDURE — 82570 ASSAY OF URINE CREATININE: CPT

## 2025-07-10 PROCEDURE — 82436 ASSAY OF URINE CHLORIDE: CPT

## 2025-07-10 PROCEDURE — 97110 THERAPEUTIC EXERCISES: CPT

## 2025-07-10 PROCEDURE — 85025 COMPLETE CBC W/AUTO DIFF WBC: CPT

## 2025-07-10 PROCEDURE — 84300 ASSAY OF URINE SODIUM: CPT

## 2025-07-10 PROCEDURE — 80162 ASSAY OF DIGOXIN TOTAL: CPT

## 2025-07-10 RX ADMIN — METOPROLOL SUCCINATE 25 MG: 25 TABLET, EXTENDED RELEASE ORAL at 08:41

## 2025-07-10 RX ADMIN — HYDRALAZINE HYDROCHLORIDE 25 MG: 25 TABLET ORAL at 08:41

## 2025-07-10 RX ADMIN — SODIUM CHLORIDE: 0.9 INJECTION, SOLUTION INTRAVENOUS at 08:38

## 2025-07-10 RX ADMIN — Medication 10 ML: at 08:42

## 2025-07-10 RX ADMIN — LOSARTAN POTASSIUM 25 MG: 50 TABLET, FILM COATED ORAL at 08:41

## 2025-07-10 NOTE — CONSULTS
85-year-old patient who is very hard of hearing tripped and fell and had a lot of bleeding on the face.  Her tests all seem satisfactory on the head but she needs surgery on the right knee.  Her medications have already been listed and they are losartan, Toprol-XL, Xarelto 15 mg every day, digoxin 0.125 mg every 48 hours and hydralazine 25 mg twice a day.    In the review of systems she had allergies listed as iodine, cefoxitin, and doxycycline.  There is no head pain now, angina pectoris, orthopnea, abdominal pain, or left leg pain.  The right leg hurts with any movement.    In the past history, I have followed the patient for a few years for her atrial fib which is now chronic atrial fibrillation.  She has also had hypertension, left anterior fascicular block, hypercholesterolemia, slightly elevated creatinine of 1.3 in the past, some macular degeneration, aortic valvular sclerosis but not stenosis, diastolic dysfunction but no systolic dysfunction, history of kidney stones, and occasionally some right leg edema.  I spoke to her audiologist in the past and that person thought she should get that surgical procedure for extreme hearing loss but patient does not want to    In the operative history, she has had gallbladder surgery, hysterectomy followed by radiation that did cause side effects to the bladder, and treatment for kidney stones.    In the social history, she lives alone.  The nearest relative is in a different state.  She definitely wants to stay at home.    The family history, no local relatives that are able to help her but she does have friends.    On physical exam, her height is 4 foot 11 inches and the weight 143 pounds.  Blood pressure is 183/109 and I think that is elevated because she feels pain when she moves around.  She has atrial fibrillation with a ventricular rate of 109.  Respirations are 18.  She has no rales.  She does have an aortic flow murmur due to aortic valvular sclerosis.  There is 
[facility]  MD Myron Chaves MD Lisa Kniska, BULMARO Lopez CNP  Consultation  Patient's Name: Caitlyn Mitchell  2:07 PM  7/10/2025    Nephrologist: Alexander Kirby MD    Reason for Consult:  Acute kidney injury    Requesting Physician:  Miguel Coleman MD    Chief Complaint:  Fall     History Obtained From: Patient, chart    History of Present Ilness:         Mr. Mitchell is a pleasant 85-year-old woman who presented to the hospital after suffering a fall.  She suffered a right patella fracture and underwent an ORIF on 7/7.  She reports that she did not have a syncopal episode, but fell because she tripped over a mat on the ground.  Labs were stable on arrival to the hospital.  Her past medical history is significant for atrial fibrillation, uterine cancer, hyperlipidemia, hypertension, and renal calculi.         We were consulted for acute kidney injury.  Her creatinine level on arrival to the hospital was 0.8 mg/dL and had gone up to 1.4 mg/dL as of yesterday afternoon.  She was started on intravenous fluids and her creatinine did improve down to 1.0 mg/dL.  She is known to our service and was seen recently in the office by Dr. Corona.  Her typical baseline creatinine level is usually around 0.8 mg/dL.  She did suffer an acute kidney injury in February of this year.  She is seen today while sitting up in bed and is in no acute distress.  She reports that her oral intake is good.  She denies any recent nausea, vomiting, or diarrhea.  She denies any shortness of breath, chest pain, or palpitations.  She is very hard of hearing.  She reports that she has been eating well, though feels that she does not drink enough water.  She is prescribed losartan 25 mg daily.    Past Medical History:   Diagnosis Date    Anxiety     no medications, becomes short of breath when upset/ anxious    Atrial fibrillation (HCC)     Cancer (HCC) 2013    uterine    
(superficial and deep), DVT, PE, arthrofibrosis, persistent pain, chronic limb swelling, neurovascular injury, dislocation or subluxation, excessive bleeding, intraoperative and/or postoperative fracture, loosening/wear/failure of implants, possible need for further surgery, cardiopulmonary and medical complications, amputation and death. The patient voices understand of the potential complications and wishes to proceed. The benefits and alternatives have been discussed.    Julien Strickland MD

## 2025-07-10 NOTE — PROGRESS NOTES
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     Encounter, Rituals, Rites and Sacraments,  ,  ,      Name: Caitlyn Mitchell MRN: [id number]    Age: 85 y.o.     Sex: female   Language: English   Baptist: Yazdanism   Atrial fibrillation (HCC)     Date: 7/9/2025                           Spiritual Assessment began in [facility]        Referral/Consult From: Rounding   Encounter Overview/Reason:  Encounter, Rituals, Rites and Sacraments  Service Provided For: Patient    Shanika, Belief, Meaning:   Patient is connected with a shanika tradition or spiritual practice  Family/Friends No family/friends present      Importance and Influence:  Patient has no beliefs influential to healthcare decision-making identified during this visit  Family/Friends No family/friends present    Community:  Patient feels well-supported. Support system includes: Extended family  Family/Friends No family/friends present    Assessment and Plan of Care:     Patient Interventions include: Facilitated expression of thoughts and feelings, Affirmed coping skills/support systems, and Provided sacramental/Evangelical ritual  Family/Friends Interventions include: No family/friends present    Patient Plan of Care: Spiritual Care available upon further referral  Family/Friends Plan of Care: Spiritual Care available upon further referral    Electronically signed by Chaplain Denise on 7/9/2025 at 5:23 PM   
Thank you for consult  Full consult note in am     86 y/o F presenting with mechanical fall   She was founds to have Closed displaced fracture of patella s/p fixation now   Has hx of HTN , afib , hld   Nephrology asked to see for BUCK     -Acute kidney injury AKIN II presumbtively hemodynamically mediated in setting of low BP , Afib/rvr episodes along with poor po intake     Baseline cr 0.8 .   Cr stable over the last 48 hours at 1.3    Plan :    -continue volume replacement utilizing NS , increase rate to 75 cc/hr  -check urine lytes , spot urine study to quantify proteinuria   -hold losartan   -am labs  
40.8 38.9 36.5   MCV 90.7 91.3 92.6   MCH 29.6 29.3 29.9   MCHC 32.6 32.1 32.3   RDW 16.2* 16.3* 16.4*    168 187   MPV 9.4 10.3 10.4       CBC with Differential:    Lab Results   Component Value Date/Time    WBC 13.2 07/08/2025 10:41 AM    RBC 3.94 07/08/2025 10:41 AM    HGB 11.8 07/08/2025 10:41 AM    HCT 36.5 07/08/2025 10:41 AM     07/08/2025 10:41 AM    MCV 92.6 07/08/2025 10:41 AM    MCH 29.9 07/08/2025 10:41 AM    MCHC 32.3 07/08/2025 10:41 AM    RDW 16.4 07/08/2025 10:41 AM    LYMPHOPCT 5 07/08/2025 10:41 AM    MONOPCT 7 07/08/2025 10:41 AM    EOSPCT 0 07/08/2025 10:41 AM    BASOPCT 0 07/08/2025 10:41 AM    MONOSABS 0.90 07/08/2025 10:41 AM    LYMPHSABS 0.70 07/08/2025 10:41 AM    EOSABS 0.01 07/08/2025 10:41 AM    BASOSABS 0.02 07/08/2025 10:41 AM     BMP:    Lab Results   Component Value Date/Time     07/08/2025 10:41 AM    K 4.4 07/08/2025 10:41 AM    K 4.1 05/28/2021 01:00 AM     07/08/2025 10:41 AM    CO2 19 07/08/2025 10:41 AM    BUN 38 07/08/2025 10:41 AM    CREATININE 1.3 07/08/2025 10:41 AM    CALCIUM 8.3 07/08/2025 10:41 AM    GFRAA >60 08/20/2022 03:35 PM    LABGLOM 41 07/08/2025 10:41 AM    GLUCOSE 137 07/08/2025 10:41 AM     Magnesium:    Lab Results   Component Value Date/Time    MG 1.9 07/08/2025 10:41 AM     Phosphorus:    Lab Results   Component Value Date/Time    PHOS 2.9 07/08/2025 10:41 AM        Radiology:   Fluoro For Surgical Procedures   Final Result   Intraprocedural fluoroscopic spot images as above.  See separate procedure   report for more information.         CT HEAD WO CONTRAST   Final Result   mild atrophy and chronic changes seen within the brain with no acute   intracranial abnormality.         CT CERVICAL SPINE WO CONTRAST   Final Result   No acute fracture or traumatic malalignment of the cervical spine. Multilevel   degenerative changes seen throughout the cervical spine.         XR CHEST PORTABLE   Final Result   1. Cardiomegaly with underlying 
Documentation Reviewer    PROVIDER RESPONSE TEXT:    This patient has SIRS of non-infectious origin due to dehydration without   acute organ dysfunction.    Query created by: Kiki Saldana on 7/10/2025 3:18 PM      Electronically signed by:  Abdirahman Carrillo MD 7/10/2025 3:49 PM          
chronic changes seen within the brain with no acute   intracranial abnormality.         CT CERVICAL SPINE WO CONTRAST   Final Result   No acute fracture or traumatic malalignment of the cervical spine. Multilevel   degenerative changes seen throughout the cervical spine.         XR CHEST PORTABLE   Final Result   1. Cardiomegaly with underlying chronic changes seen throughout the lung   fields bilaterally. Trace bilateral pleural effusions stable and unchanged.   2. Fracture involving the patella with minimal displacement. Moderate-sized   joint effusion.   3. No acute bony abnormality seen within the pelvis or hips bilaterally.         XR HIP BILATERAL W AP PELVIS (2 VIEWS)   Final Result   1. Cardiomegaly with underlying chronic changes seen throughout the lung   fields bilaterally. Trace bilateral pleural effusions stable and unchanged.   2. Fracture involving the patella with minimal displacement. Moderate-sized   joint effusion.   3. No acute bony abnormality seen within the pelvis or hips bilaterally.         XR KNEE RIGHT (1-2 VIEWS)   Final Result   1. Cardiomegaly with underlying chronic changes seen throughout the lung   fields bilaterally. Trace bilateral pleural effusions stable and unchanged.   2. Fracture involving the patella with minimal displacement. Moderate-sized   joint effusion.   3. No acute bony abnormality seen within the pelvis or hips bilaterally.         Fluoro For Surgical Procedures    (Results Pending)       Assessment:    Principal Problem:    Atrial fibrillation (HCC)  Active Problems:    Closed displaced fracture of patella  Resolved Problems:    * No resolved hospital problems. *      Plan:  Patellar fx s/p procedure continue meds for symptom management. Encourage activity per ortho. Encourage IS  Htn continue meds and tx for pain  Atrial fib pt given bp meds in ER but did not give BB. EKG not obtained with the RVR. Will obtain EKG. Will ask cardiology to see.   Dehydration pt

## 2025-07-10 NOTE — CARE COORDINATION
DC order noted.  Plan to [facility] at 12:30p via [facility] ambullette.  Patient reports she has no one to transport and is aware she may get a bill for transport later.  She states she has no one she wants notified of DC she will take care of that.  HENS done, will need ZACK signed at [place].

## 2025-07-10 NOTE — PLAN OF CARE
Problem: Safety - Adult  Goal: Free from fall injury  Outcome: Adequate for Discharge     Problem: Chronic Conditions and Co-morbidities  Goal: Patient's chronic conditions and co-morbidity symptoms are monitored and maintained or improved  Outcome: Adequate for Discharge     Problem: Discharge Planning  Goal: Discharge to home or other facility with appropriate resources  Outcome: Adequate for Discharge     Problem: Skin/Tissue Integrity  Goal: Skin integrity remains intact  Description: 1.  Monitor for areas of redness and/or skin breakdown  2.  Assess vascular access sites hourly  3.  Every 4-6 hours minimum:  Change oxygen saturation probe site  4.  Every 4-6 hours:  If on nasal continuous positive airway pressure, respiratory therapy assess nares and determine need for appliance change or resting period  Outcome: Adequate for Discharge     Problem: ABCDS Injury Assessment  Goal: Absence of physical injury  Outcome: Adequate for Discharge     Problem: Pain  Goal: Verbalizes/displays adequate comfort level or baseline comfort level  Outcome: Adequate for Discharge

## 2025-07-10 NOTE — DISCHARGE SUMMARY
[facility] Hospitalist       Hospitalist Physician Discharge Summary       Julien Strickland MD  [address]  [facility] [address]  [phone]    Follow up in 2 week(s)  For suture removal    [place] [facility]  [address] [address]  [phone]          Activity level: Slowly increase as tolerated    Diet: ADULT DIET; Regular    Labs: None are pending at the discharge    Condition at discharge: Stable    Dispo: Return to facility    Patient ID:  Caitlyn Mitchell  [id number]  85 y.o.  1940    Admit date: 7/6/2025    Discharge date and time:  7/10/2025  10:38 AM    Admission Diagnoses: Principal Problem:    Atrial fibrillation (HCC)  Active Problems:    Closed displaced fracture of patella    Dehydration  Resolved Problems:    * No resolved hospital problems. *      Discharge Diagnoses: Principal Problem:    Atrial fibrillation (HCC)  Active Problems:    Closed displaced fracture of patella    Dehydration  Resolved Problems:    * No resolved hospital problems. *      Consults:  IP CONSULT TO ORTHOPEDIC SURGERY  IP CONSULT TO CARDIOLOGY  INPATIENT CONSULT TO ORTHOTIST/PROSTHETIST  IP CONSULT TO NEPHROLOGY    Procedures: None significant except if described in hospital course.    Hospital Course: History of present illness from History and Physical:  pt is an 85 y.o. female who had fallen at [employer] and started bleeding from nose after contact with floor and developed right knee pain which limited her ability to move. Pt does not know why she fell but figured she must have tripped over something. Pt denies symptoms before, during, or after except for some nausea afterward. Pt denies fevers, chills, cp, sob, vomiting, melena, hematochezia, constipation, change in sensation or unilateral weakness, dysuria, hematuria. Pt does note a bout of diarrhea yesterday. Pt fasted prior to [employer]. Pt ate dinner last night. Pt admits that she does not drink much

## 2025-07-11 ENCOUNTER — HOSPITAL ENCOUNTER (EMERGENCY)
Age: 85
Discharge: HOME | End: 2025-07-12
Attending: EMERGENCY MEDICINE
Payer: MEDICARE

## 2025-07-11 ENCOUNTER — APPOINTMENT (OUTPATIENT)
Dept: CT IMAGING | Age: 85
End: 2025-07-11
Payer: MEDICARE

## 2025-07-11 DIAGNOSIS — S00.83XD CONTUSION OF FACE, SUBSEQUENT ENCOUNTER: ICD-10-CM

## 2025-07-11 DIAGNOSIS — R07.9 CHEST PAIN, UNSPECIFIED TYPE: Primary | ICD-10-CM

## 2025-07-11 DIAGNOSIS — R07.89 CHEST WALL PAIN: ICD-10-CM

## 2025-07-11 LAB
ALBUMIN SERPL-MCNC: 3.4 G/DL (ref 3.5–5.2)
ALP SERPL-CCNC: 117 U/L (ref 35–104)
ALT SERPL-CCNC: 10 U/L (ref 0–35)
ANION GAP SERPL CALCULATED.3IONS-SCNC: 9 MMOL/L (ref 7–16)
AST SERPL-CCNC: 21 U/L (ref 0–35)
BASOPHILS # BLD: 0.04 K/UL (ref 0–0.2)
BASOPHILS NFR BLD: 1 % (ref 0–2)
BILIRUB SERPL-MCNC: 0.5 MG/DL (ref 0–1.2)
BUN SERPL-MCNC: 23 MG/DL (ref 8–23)
CALCIUM SERPL-MCNC: 9 MG/DL (ref 8.8–10.2)
CHLORIDE SERPL-SCNC: 111 MMOL/L (ref 98–107)
CO2 SERPL-SCNC: 21 MMOL/L (ref 22–29)
CREAT SERPL-MCNC: 0.8 MG/DL (ref 0.5–1)
DATE LAST DOSE: NORMAL
DIGOXIN DOSE TIME: NORMAL
DIGOXIN DOSE: NORMAL MG
DIGOXIN SERPL-MCNC: 1.2 NG/ML (ref 0.8–2)
EKG ATRIAL RATE: 98 BPM
EKG Q-T INTERVAL: 376 MS
EKG QRS DURATION: 72 MS
EKG QTC CALCULATION (BAZETT): 425 MS
EKG R AXIS: -30 DEGREES
EKG T AXIS: 83 DEGREES
EKG VENTRICULAR RATE: 77 BPM
EOSINOPHIL # BLD: 0.23 K/UL (ref 0.05–0.5)
EOSINOPHILS RELATIVE PERCENT: 3 % (ref 0–6)
ERYTHROCYTE [DISTWIDTH] IN BLOOD BY AUTOMATED COUNT: 16.5 % (ref 11.5–15)
GFR, ESTIMATED: 67 ML/MIN/1.73M2
GLUCOSE SERPL-MCNC: 107 MG/DL (ref 74–99)
HCT VFR BLD AUTO: 37.3 % (ref 34–48)
HGB BLD-MCNC: 12 G/DL (ref 11.5–15.5)
IMM GRANULOCYTES # BLD AUTO: 0.06 K/UL (ref 0–0.58)
IMM GRANULOCYTES NFR BLD: 1 % (ref 0–5)
LYMPHOCYTES NFR BLD: 1.06 K/UL (ref 1.5–4)
LYMPHOCYTES RELATIVE PERCENT: 14 % (ref 20–42)
MCH RBC QN AUTO: 29.4 PG (ref 26–35)
MCHC RBC AUTO-ENTMCNC: 32.2 G/DL (ref 32–34.5)
MCV RBC AUTO: 91.4 FL (ref 80–99.9)
MONOCYTES NFR BLD: 0.66 K/UL (ref 0.1–0.95)
MONOCYTES NFR BLD: 9 % (ref 2–12)
NEUTROPHILS NFR BLD: 72 % (ref 43–80)
NEUTS SEG NFR BLD: 5.34 K/UL (ref 1.8–7.3)
PLATELET # BLD AUTO: 191 K/UL (ref 130–450)
PMV BLD AUTO: 10.2 FL (ref 7–12)
POTASSIUM SERPL-SCNC: 4.8 MMOL/L (ref 3.5–5.1)
PROT SERPL-MCNC: 5.8 G/DL (ref 6.4–8.3)
RBC # BLD AUTO: 4.08 M/UL (ref 3.5–5.5)
SODIUM SERPL-SCNC: 141 MMOL/L (ref 136–145)
TROPONIN I SERPL HS-MCNC: 33 NG/L (ref 0–14)
TROPONIN I SERPL HS-MCNC: 36 NG/L (ref 0–14)
WBC OTHER # BLD: 7.4 K/UL (ref 4.5–11.5)

## 2025-07-11 PROCEDURE — 96375 TX/PRO/DX INJ NEW DRUG ADDON: CPT

## 2025-07-11 PROCEDURE — 71275 CT ANGIOGRAPHY CHEST: CPT

## 2025-07-11 PROCEDURE — 70450 CT HEAD/BRAIN W/O DYE: CPT

## 2025-07-11 PROCEDURE — 84484 ASSAY OF TROPONIN QUANT: CPT

## 2025-07-11 PROCEDURE — 70486 CT MAXILLOFACIAL W/O DYE: CPT

## 2025-07-11 PROCEDURE — 96376 TX/PRO/DX INJ SAME DRUG ADON: CPT

## 2025-07-11 PROCEDURE — 85025 COMPLETE CBC W/AUTO DIFF WBC: CPT

## 2025-07-11 PROCEDURE — 2500000003 HC RX 250 WO HCPCS

## 2025-07-11 PROCEDURE — 96374 THER/PROPH/DIAG INJ IV PUSH: CPT

## 2025-07-11 PROCEDURE — 80053 COMPREHEN METABOLIC PANEL: CPT

## 2025-07-11 PROCEDURE — 80162 ASSAY OF DIGOXIN TOTAL: CPT

## 2025-07-11 PROCEDURE — 93005 ELECTROCARDIOGRAM TRACING: CPT

## 2025-07-11 PROCEDURE — 6370000000 HC RX 637 (ALT 250 FOR IP)

## 2025-07-11 PROCEDURE — 6360000004 HC RX CONTRAST MEDICATION: Performed by: SPECIALIST

## 2025-07-11 PROCEDURE — 2580000003 HC RX 258

## 2025-07-11 PROCEDURE — 6360000002 HC RX W HCPCS

## 2025-07-11 PROCEDURE — 99285 EMERGENCY DEPT VISIT HI MDM: CPT

## 2025-07-11 PROCEDURE — 93010 ELECTROCARDIOGRAM REPORT: CPT | Performed by: INTERNAL MEDICINE

## 2025-07-11 PROCEDURE — 6360000002 HC RX W HCPCS: Performed by: EMERGENCY MEDICINE

## 2025-07-11 RX ORDER — IOPAMIDOL 755 MG/ML
75 INJECTION, SOLUTION INTRAVASCULAR
Status: COMPLETED | OUTPATIENT
Start: 2025-07-11 | End: 2025-07-11

## 2025-07-11 RX ORDER — I-VITE, TAB 1000-60-2MG (60/BT) 300MCG-200
1 TAB ORAL DAILY
COMMUNITY

## 2025-07-11 RX ORDER — POLYETHYLENE GLYCOL 3350 17 G/17G
17 POWDER, FOR SOLUTION ORAL DAILY PRN
COMMUNITY

## 2025-07-11 RX ORDER — 0.9 % SODIUM CHLORIDE 0.9 %
1000 INTRAVENOUS SOLUTION INTRAVENOUS ONCE
Status: COMPLETED | OUTPATIENT
Start: 2025-07-11 | End: 2025-07-11

## 2025-07-11 RX ORDER — LOSARTAN POTASSIUM 50 MG/1
25 TABLET ORAL ONCE
Status: COMPLETED | OUTPATIENT
Start: 2025-07-11 | End: 2025-07-11

## 2025-07-11 RX ORDER — LABETALOL HYDROCHLORIDE 5 MG/ML
20 INJECTION, SOLUTION INTRAVENOUS ONCE
Status: COMPLETED | OUTPATIENT
Start: 2025-07-11 | End: 2025-07-11

## 2025-07-11 RX ORDER — HYDRALAZINE HYDROCHLORIDE 25 MG/1
25 TABLET, FILM COATED ORAL ONCE
Status: COMPLETED | OUTPATIENT
Start: 2025-07-11 | End: 2025-07-11

## 2025-07-11 RX ORDER — ACETAMINOPHEN 325 MG/1
650 TABLET ORAL EVERY 6 HOURS PRN
COMMUNITY

## 2025-07-11 RX ORDER — DIPHENHYDRAMINE HYDROCHLORIDE 50 MG/ML
25 INJECTION, SOLUTION INTRAMUSCULAR; INTRAVENOUS ONCE
Status: COMPLETED | OUTPATIENT
Start: 2025-07-11 | End: 2025-07-11

## 2025-07-11 RX ORDER — METOPROLOL SUCCINATE 25 MG/1
25 TABLET, EXTENDED RELEASE ORAL ONCE
Status: COMPLETED | OUTPATIENT
Start: 2025-07-11 | End: 2025-07-11

## 2025-07-11 RX ADMIN — LOSARTAN POTASSIUM 25 MG: 50 TABLET, FILM COATED ORAL at 15:16

## 2025-07-11 RX ADMIN — METOPROLOL SUCCINATE 25 MG: 25 TABLET, EXTENDED RELEASE ORAL at 15:16

## 2025-07-11 RX ADMIN — SODIUM CHLORIDE 1000 ML: 0.9 INJECTION, SOLUTION INTRAVENOUS at 09:47

## 2025-07-11 RX ADMIN — DIPHENHYDRAMINE HYDROCHLORIDE 25 MG: 50 INJECTION INTRAMUSCULAR; INTRAVENOUS at 09:49

## 2025-07-11 RX ADMIN — WATER 125 MG: 1 INJECTION INTRAMUSCULAR; INTRAVENOUS; SUBCUTANEOUS at 09:48

## 2025-07-11 RX ADMIN — IOPAMIDOL 75 ML: 755 INJECTION, SOLUTION INTRAVENOUS at 11:17

## 2025-07-11 RX ADMIN — LABETALOL HYDROCHLORIDE 20 MG: 5 INJECTION INTRAVENOUS at 14:14

## 2025-07-11 RX ADMIN — HYDRALAZINE HYDROCHLORIDE 25 MG: 25 TABLET ORAL at 15:16

## 2025-07-11 RX ADMIN — LABETALOL HYDROCHLORIDE 20 MG: 5 INJECTION INTRAVENOUS at 12:16

## 2025-07-11 ASSESSMENT — HEART SCORE: ECG: NON-SPECIFC REPOLARIZATION DISTURBANCE/LBTB/PM

## 2025-07-11 ASSESSMENT — PAIN SCALES - GENERAL: PAINLEVEL_OUTOF10: 6

## 2025-07-11 ASSESSMENT — PAIN - FUNCTIONAL ASSESSMENT: PAIN_FUNCTIONAL_ASSESSMENT: 0-10

## 2025-07-11 NOTE — ED NOTES
Report given to RN from [facility] from [facility].   Report method by phone   The following was reviewed with receiving RN:   Current vital signs:  /73   Pulse 91   Temp 97.4 °F (36.3 °C)   Resp 26   LMP  (LMP Unknown)   SpO2 93%                MEWS Score: 3     Any medication or safety alerts were reviewed. Any pending diagnostics and notifications were also reviewed, as well as any safety concerns or issues, abnormal labs, abnormal imaging, and abnormal assessment findings. Questions were answered.

## 2025-07-11 NOTE — ED NOTES
Pt requested food, this RN explained that we have chocolate pudding, a turkey sandwich, and chips on hand, pt refused all but the chips and said that it was \"unacceptable\"

## 2025-07-11 NOTE — ED PROVIDER NOTES
ATTENDING PROVIDER ATTESTATION:     Caitlyn Mitchell presented to the emergency department for evaluation of Fall (Pt fell at [facility] on the 7th. Ecchymosis to face. Right leg injury. Pt seen yesterday at [facility] and discharged to [facility]. Pt sent in today for concern of right pupil slower to respond compared to the left. +thinners. )   and was initially evaluated by the Medical Resident. See Original ED Note for H&P and ED course above.     I have reviewed and discussed the case, including pertinent history (medical, surgical, family and social) and exam findings with the Medical Resident assigned to Caitlyn Mitchell.  I have personally performed and/or participated in the history, exam, medical decision making, and procedures and agree with all pertinent clinical information and any additional changes or corrections are noted below in my assessment and plan. I have discussed this patient in detail with the resident, and provided the instruction and education,       I have reviewed my findings and recommendations with the assigned Medical Resident, Caitlyn TRUONG Paolozinajolly and members of family present at the time of disposition.      I have performed a history and physical examination of this patient and directly supervised the resident caring for this patient              [facility] EMERGENCY DEPARTMENT  EMERGENCY DEPARTMENT ENCOUNTER        Pt Name: Caitlyn Mitchell  MRN: [id number]  Birthdate 1940  Date of evaluation: 7/11/2025  Provider: Mando Muñoz MD  PCP: Miguel Coleman MD  Note Started: 9:23 AM EDT 7/11/25    CHIEF COMPLAINT:     Chief Complaint   Patient presents with    Fall     Pt fell at [facility] on the 7th. Ecchymosis to face. Right leg injury. Pt seen yesterday at [facility] and discharged to [facility]. Pt sent in today for concern of right pupil slower to respond compared to the left. +thinners.        HISTORY OF PRESENT ILLNESS:       Caitlyn Mitchell is a 85 y.o. female

## 2025-07-11 NOTE — ED PROVIDER NOTES
[facility] EMERGENCY DEPARTMENT  EMERGENCY DEPARTMENT ENCOUNTER        Pt Name: Caitlyn Mitchell  MRN: [id number]  Birthdate 1940  Date of evaluation: 7/11/2025  Provider: Julien Daily DO  PCP: Miguel Coleman MD  Note Started: 8:47 AM EDT 7/11/25    CHIEF COMPLAINT       Chief Complaint   Patient presents with    Fall     Pt fell at [employer] on the 7th. Ecchymosis to face. Right leg injury. Pt seen yesterday at [facility] and discharged to [facility]. Pt sent in today for concern of right pupil slower to respond compared to the left. +thinners.        HISTORY OF PRESENT ILLNESS: 1 or more Elements   History From: Patient    Limitations to history : None    Caitlyn Mitchell is a 85 y.o. female with a past medical history of HTN, endometrial cancer, osteoarthritis, A-fib RVR, CHF, HLD, cholecystectomy, hysterectomy who presents to the ED for chest pain for the past 2 days, as well as concern from [facility] for \"slow pupil reactivity\" at the nursing facility today.    Patient states that she has not had any vision problems and is unsure what vision abnormality they told the patient that she was having at the facility.  She is awake alert and oriented no acute distress.  Patient states that she has had some chest pain.  She states she has had some facial pain since her fall on 7/6/2025.  Patient was seen since at bedtime of [facility] on 7/6/2025 for a fall and was diagnosed with a right patella fracture.  Patient had surgery on 7/7/2025 for this right patellar fracture.  Patient states that her knee and leg have been feeling well since the surgery.  Patient has been on aspirin and Xarelto and has not missed any doses.  Patient does not report any missed doses of any other medications.  Patient denies any fever or chills.  Denies headache, shortness of breath, abdominal pain, nausea, vomiting, diarrhea, lightness, dysuria, hematuria, hematochezia, or melena.   seconds  Integument: skin warm and dry. No rashes.   Neurologic: no focal deficits, CN II-XII grossly intact. Symmetric strength 5/5 in the upper and lower extremities bilaterally  Psychiatric: Normal Affect        DIAGNOSTIC RESULTS   LABS:    Labs Reviewed   CBC WITH AUTO DIFFERENTIAL - Abnormal; Notable for the following components:       Result Value    RDW 16.5 (*)     Lymphocytes % 14 (*)     Lymphocytes Absolute 1.06 (*)     All other components within normal limits   COMPREHENSIVE METABOLIC PANEL - Abnormal; Notable for the following components:    Chloride 111 (*)     CO2 21 (*)     Glucose 107 (*)     Total Protein 5.8 (*)     Albumin 3.4 (*)     Alkaline Phosphatase 117 (*)     All other components within normal limits   TROPONIN - Abnormal; Notable for the following components:    Troponin, High Sensitivity 36 (*)     All other components within normal limits   TROPONIN - Abnormal; Notable for the following components:    Troponin, High Sensitivity 33 (*)     All other components within normal limits   DIGOXIN LEVEL       As interpreted by me, the above displayed labs are abnormal. All other labs obtained during this visit were within normal range or not returned as of this dictation.      EKG Interpretation  Interpreted by emergency department physician, Julien Daily DO    ECG: Twelve-lead ECG shows atrial fibrillation with a rate of 77 bpm.  Left axis deviation.  QTc 425 ms.  No obvious acute ischemic ST-T wave T wave changes.  Compared with prior ECG.        RADIOLOGY:   Non-plain film images such as CT, Ultrasound and MRI are read by the radiologist. Plain radiographic images are visualized and preliminarily interpreted by the ED Provider with the below findings:    CT head without contrast: No evidence of acute intracranial abnormality    CT facial bones without contrast: No evidence of facial hematoma or acute facial bone fracture    CTA pulmonary with contrast: No evidence of PE.  No evidence

## 2025-07-11 NOTE — ED NOTES
Pt requested RN to room, this RN entered room and pt was agitated. Stated that \"the  was unacceptable\" and she was not okay with what we had on hand. I explained to the patient that she would be going back to her home soon. She requested a supervising nurse. Charge notified

## 2025-07-12 VITALS
DIASTOLIC BLOOD PRESSURE: 98 MMHG | OXYGEN SATURATION: 96 % | SYSTOLIC BLOOD PRESSURE: 148 MMHG | TEMPERATURE: 97.4 F | HEART RATE: 95 BPM | RESPIRATION RATE: 34 BRPM

## 2025-07-13 ENCOUNTER — OUTSIDE SERVICES (OUTPATIENT)
Dept: PRIMARY CARE CLINIC | Age: 85
End: 2025-07-13

## 2025-07-13 DIAGNOSIS — R53.1 GENERALIZED WEAKNESS: ICD-10-CM

## 2025-07-13 DIAGNOSIS — Z98.890 S/P ORIF (OPEN REDUCTION INTERNAL FIXATION) FRACTURE: ICD-10-CM

## 2025-07-13 DIAGNOSIS — R53.81 PHYSICAL DECONDITIONING: ICD-10-CM

## 2025-07-13 DIAGNOSIS — I10 PRIMARY HYPERTENSION: ICD-10-CM

## 2025-07-13 DIAGNOSIS — Z87.81 S/P ORIF (OPEN REDUCTION INTERNAL FIXATION) FRACTURE: ICD-10-CM

## 2025-07-13 DIAGNOSIS — S82.001A CLOSED DISPLACED FRACTURE OF RIGHT PATELLA, UNSPECIFIED FRACTURE MORPHOLOGY, INITIAL ENCOUNTER: Primary | ICD-10-CM

## 2025-07-13 DIAGNOSIS — N17.9 AKI (ACUTE KIDNEY INJURY): ICD-10-CM

## 2025-07-13 DIAGNOSIS — I48.91 ATRIAL FIBRILLATION, UNSPECIFIED TYPE (HCC): ICD-10-CM

## 2025-07-13 DIAGNOSIS — Z91.81 AT MAXIMUM RISK FOR FALL: ICD-10-CM

## 2025-07-13 DIAGNOSIS — H57.02 UNEQUAL PUPILS: ICD-10-CM

## 2025-07-15 ENCOUNTER — OUTSIDE SERVICES (OUTPATIENT)
Dept: PRIMARY CARE CLINIC | Age: 85
End: 2025-07-15

## 2025-07-15 ENCOUNTER — TELEPHONE (OUTPATIENT)
Dept: AUDIOLOGY | Age: 85
End: 2025-07-15

## 2025-07-15 DIAGNOSIS — N17.9 AKI (ACUTE KIDNEY INJURY): ICD-10-CM

## 2025-07-15 DIAGNOSIS — I50.21 HYPERTENSIVE HEART DISEASE WITH ACUTE SYSTOLIC CONGESTIVE HEART FAILURE (HCC): ICD-10-CM

## 2025-07-15 DIAGNOSIS — I11.0 HYPERTENSIVE HEART DISEASE WITH ACUTE SYSTOLIC CONGESTIVE HEART FAILURE (HCC): ICD-10-CM

## 2025-07-15 DIAGNOSIS — S82.001A CLOSED DISPLACED FRACTURE OF RIGHT PATELLA, UNSPECIFIED FRACTURE MORPHOLOGY, INITIAL ENCOUNTER: Primary | ICD-10-CM

## 2025-07-15 DIAGNOSIS — Z87.81 S/P ORIF (OPEN REDUCTION INTERNAL FIXATION) FRACTURE: ICD-10-CM

## 2025-07-15 DIAGNOSIS — R53.81 PHYSICAL DECONDITIONING: ICD-10-CM

## 2025-07-15 DIAGNOSIS — E04.2 MULTIPLE THYROID NODULES: ICD-10-CM

## 2025-07-15 DIAGNOSIS — S06.0X0D CONCUSSION WITHOUT LOSS OF CONSCIOUSNESS, SUBSEQUENT ENCOUNTER: ICD-10-CM

## 2025-07-15 DIAGNOSIS — I48.91 ATRIAL FIBRILLATION, UNSPECIFIED TYPE (HCC): ICD-10-CM

## 2025-07-15 DIAGNOSIS — I50.9 ACUTE DECOMPENSATED HEART FAILURE (HCC): ICD-10-CM

## 2025-07-15 DIAGNOSIS — R41.841 COGNITIVE COMMUNICATION DEFICIT: ICD-10-CM

## 2025-07-15 DIAGNOSIS — R53.1 GENERALIZED WEAKNESS: ICD-10-CM

## 2025-07-15 DIAGNOSIS — Z98.890 S/P ORIF (OPEN REDUCTION INTERNAL FIXATION) FRACTURE: ICD-10-CM

## 2025-07-15 DIAGNOSIS — I10 PRIMARY HYPERTENSION: ICD-10-CM

## 2025-07-15 NOTE — TELEPHONE ENCOUNTER
Patient's friend Monica called and lvm that Caitlyn is having issues with her hearing aid and .  Phone: [phone]    Electronically signed by Janett Perez on 7/15/2025 at 1:37 PM

## 2025-07-16 ENCOUNTER — HOSPITAL ENCOUNTER (EMERGENCY)
Age: 85
Discharge: HOME | End: 2025-07-17
Attending: EMERGENCY MEDICINE
Payer: MEDICARE

## 2025-07-16 ENCOUNTER — APPOINTMENT (OUTPATIENT)
Dept: GENERAL RADIOLOGY | Age: 85
End: 2025-07-16
Payer: MEDICARE

## 2025-07-16 DIAGNOSIS — R07.9 CHEST PAIN, UNSPECIFIED TYPE: Primary | ICD-10-CM

## 2025-07-16 LAB
ALBUMIN SERPL-MCNC: 3.2 G/DL (ref 3.5–5.2)
ALP SERPL-CCNC: 128 U/L (ref 35–104)
ALT SERPL-CCNC: 13 U/L (ref 0–35)
ANION GAP SERPL CALCULATED.3IONS-SCNC: 11 MMOL/L (ref 7–16)
AST SERPL-CCNC: 18 U/L (ref 0–35)
BASOPHILS # BLD: 0.04 K/UL (ref 0–0.2)
BASOPHILS NFR BLD: 0 % (ref 0–2)
BILIRUB SERPL-MCNC: 0.3 MG/DL (ref 0–1.2)
BUN SERPL-MCNC: 24 MG/DL (ref 8–23)
CALCIUM SERPL-MCNC: 9.5 MG/DL (ref 8.8–10.2)
CHLORIDE SERPL-SCNC: 109 MMOL/L (ref 98–107)
CO2 SERPL-SCNC: 21 MMOL/L (ref 22–29)
CREAT SERPL-MCNC: 0.9 MG/DL (ref 0.5–1)
EOSINOPHIL # BLD: 0.31 K/UL (ref 0.05–0.5)
EOSINOPHILS RELATIVE PERCENT: 3 % (ref 0–6)
ERYTHROCYTE [DISTWIDTH] IN BLOOD BY AUTOMATED COUNT: 16.5 % (ref 11.5–15)
GFR, ESTIMATED: 62 ML/MIN/1.73M2
GLUCOSE SERPL-MCNC: 99 MG/DL (ref 74–99)
HCT VFR BLD AUTO: 33.2 % (ref 34–48)
HGB BLD-MCNC: 11.1 G/DL (ref 11.5–15.5)
IMM GRANULOCYTES # BLD AUTO: 0.2 K/UL (ref 0–0.58)
IMM GRANULOCYTES NFR BLD: 2 % (ref 0–5)
LYMPHOCYTES NFR BLD: 1.4 K/UL (ref 1.5–4)
LYMPHOCYTES RELATIVE PERCENT: 14 % (ref 20–42)
MCH RBC QN AUTO: 29.9 PG (ref 26–35)
MCHC RBC AUTO-ENTMCNC: 33.4 G/DL (ref 32–34.5)
MCV RBC AUTO: 89.5 FL (ref 80–99.9)
MONOCYTES NFR BLD: 0.83 K/UL (ref 0.1–0.95)
MONOCYTES NFR BLD: 9 % (ref 2–12)
NEUTROPHILS NFR BLD: 72 % (ref 43–80)
NEUTS SEG NFR BLD: 7.03 K/UL (ref 1.8–7.3)
PLATELET # BLD AUTO: 227 K/UL (ref 130–450)
PMV BLD AUTO: 9.4 FL (ref 7–12)
POTASSIUM SERPL-SCNC: 4.7 MMOL/L (ref 3.5–5.1)
PROT SERPL-MCNC: 5.5 G/DL (ref 6.4–8.3)
RBC # BLD AUTO: 3.71 M/UL (ref 3.5–5.5)
SODIUM SERPL-SCNC: 140 MMOL/L (ref 136–145)
TROPONIN I SERPL HS-MCNC: 32 NG/L (ref 0–14)
WBC OTHER # BLD: 9.8 K/UL (ref 4.5–11.5)

## 2025-07-16 PROCEDURE — 80053 COMPREHEN METABOLIC PANEL: CPT

## 2025-07-16 PROCEDURE — 85025 COMPLETE CBC W/AUTO DIFF WBC: CPT

## 2025-07-16 PROCEDURE — 93005 ELECTROCARDIOGRAM TRACING: CPT | Performed by: EMERGENCY MEDICINE

## 2025-07-16 PROCEDURE — 99285 EMERGENCY DEPT VISIT HI MDM: CPT

## 2025-07-16 PROCEDURE — 71045 X-RAY EXAM CHEST 1 VIEW: CPT

## 2025-07-16 PROCEDURE — 84484 ASSAY OF TROPONIN QUANT: CPT

## 2025-07-16 RX ORDER — DIPHENHYDRAMINE HYDROCHLORIDE 50 MG/ML
25 INJECTION, SOLUTION INTRAMUSCULAR; INTRAVENOUS ONCE
Status: COMPLETED | OUTPATIENT
Start: 2025-07-16 | End: 2025-07-17

## 2025-07-16 RX ORDER — ONDANSETRON 2 MG/ML
4 INJECTION INTRAMUSCULAR; INTRAVENOUS ONCE
Status: COMPLETED | OUTPATIENT
Start: 2025-07-16 | End: 2025-07-17

## 2025-07-17 ENCOUNTER — OUTSIDE SERVICES (OUTPATIENT)
Dept: PRIMARY CARE CLINIC | Age: 85
End: 2025-07-17

## 2025-07-17 ENCOUNTER — APPOINTMENT (OUTPATIENT)
Dept: CT IMAGING | Age: 85
End: 2025-07-17
Payer: MEDICARE

## 2025-07-17 VITALS
RESPIRATION RATE: 18 BRPM | TEMPERATURE: 98.5 F | OXYGEN SATURATION: 96 % | DIASTOLIC BLOOD PRESSURE: 88 MMHG | SYSTOLIC BLOOD PRESSURE: 158 MMHG | HEART RATE: 77 BPM

## 2025-07-17 DIAGNOSIS — I50.9 ACUTE DECOMPENSATED HEART FAILURE (HCC): ICD-10-CM

## 2025-07-17 DIAGNOSIS — Z87.81 S/P ORIF (OPEN REDUCTION INTERNAL FIXATION) FRACTURE: ICD-10-CM

## 2025-07-17 DIAGNOSIS — Z98.890 S/P ORIF (OPEN REDUCTION INTERNAL FIXATION) FRACTURE: ICD-10-CM

## 2025-07-17 DIAGNOSIS — I10 PRIMARY HYPERTENSION: ICD-10-CM

## 2025-07-17 DIAGNOSIS — S06.0X0D CONCUSSION WITHOUT LOSS OF CONSCIOUSNESS, SUBSEQUENT ENCOUNTER: ICD-10-CM

## 2025-07-17 DIAGNOSIS — R53.1 GENERALIZED WEAKNESS: ICD-10-CM

## 2025-07-17 DIAGNOSIS — R53.81 PHYSICAL DECONDITIONING: ICD-10-CM

## 2025-07-17 DIAGNOSIS — N17.9 AKI (ACUTE KIDNEY INJURY): ICD-10-CM

## 2025-07-17 DIAGNOSIS — I48.91 ATRIAL FIBRILLATION, UNSPECIFIED TYPE (HCC): ICD-10-CM

## 2025-07-17 DIAGNOSIS — I11.0 HYPERTENSIVE HEART DISEASE WITH ACUTE SYSTOLIC CONGESTIVE HEART FAILURE (HCC): ICD-10-CM

## 2025-07-17 DIAGNOSIS — R41.841 COGNITIVE COMMUNICATION DEFICIT: ICD-10-CM

## 2025-07-17 DIAGNOSIS — E04.2 MULTIPLE THYROID NODULES: ICD-10-CM

## 2025-07-17 DIAGNOSIS — I50.21 HYPERTENSIVE HEART DISEASE WITH ACUTE SYSTOLIC CONGESTIVE HEART FAILURE (HCC): ICD-10-CM

## 2025-07-17 DIAGNOSIS — S82.001A CLOSED DISPLACED FRACTURE OF RIGHT PATELLA, UNSPECIFIED FRACTURE MORPHOLOGY, INITIAL ENCOUNTER: Primary | ICD-10-CM

## 2025-07-17 LAB — TROPONIN I SERPL HS-MCNC: 29 NG/L (ref 0–14)

## 2025-07-17 PROCEDURE — 84484 ASSAY OF TROPONIN QUANT: CPT

## 2025-07-17 PROCEDURE — 6360000004 HC RX CONTRAST MEDICATION: Performed by: RADIOLOGY

## 2025-07-17 PROCEDURE — 96374 THER/PROPH/DIAG INJ IV PUSH: CPT

## 2025-07-17 PROCEDURE — 6360000002 HC RX W HCPCS: Performed by: EMERGENCY MEDICINE

## 2025-07-17 PROCEDURE — 96375 TX/PRO/DX INJ NEW DRUG ADDON: CPT

## 2025-07-17 PROCEDURE — 71275 CT ANGIOGRAPHY CHEST: CPT

## 2025-07-17 RX ORDER — IOPAMIDOL 755 MG/ML
75 INJECTION, SOLUTION INTRAVASCULAR
Status: COMPLETED | OUTPATIENT
Start: 2025-07-17 | End: 2025-07-17

## 2025-07-17 RX ADMIN — IOPAMIDOL 75 ML: 755 INJECTION, SOLUTION INTRAVENOUS at 03:07

## 2025-07-17 RX ADMIN — DIPHENHYDRAMINE HYDROCHLORIDE 25 MG: 50 INJECTION INTRAMUSCULAR; INTRAVENOUS at 00:29

## 2025-07-17 RX ADMIN — ONDANSETRON 4 MG: 2 INJECTION, SOLUTION INTRAMUSCULAR; INTRAVENOUS at 00:30

## 2025-07-17 NOTE — ED NOTES
Radiology Procedure Waiver   Name: Caitlyn Mitchell  : 1940  MRN: [id number]    Date:  25    Time: 11:24 PM EDT    Benefits of immediately proceeding with Radiology exam(s) without pre-testing outweigh the risks or are not indicated as specified below and therefore the following is/are being waived:    [] Pregnancy test   [] Patients LMP on-time and regular.   [] Patient had Tubal Ligation or has other Contraception Device.   [] Patient  is Menopausal or Premenarcheal.    [] Patient had Full or Partial Hysterectomy.    [x] Protocol for Iodine allergy    [] MRI Questionnaire     [] BUN/Creatinine   [] Patient age w/no hx of renal dysfunction.   [] Patient on Dialysis.   [] Recent Normal Labs.  Electronically signed by Deuce Sutherland DO on 25 at 11:24 PM EDT               Deuce Sutherland DO  [id number]

## 2025-07-17 NOTE — DISCHARGE INSTRUCTIONS
MPRESSION:  1. No evidence of acute pulmonary embolism.  2. Miniscule pleural effusions bilaterally.  3. Right thyroid lesion measuring 1.8 cm.  Correlate with ultrasound.  4. 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  Recommend follow-up in 3 years.  5. Additional findings above.    Your CT showed the following above.  Please follow-up with your PCP for monitoring of your infrarenal abdominal aorta as well as your right thyroid lesion

## 2025-07-17 NOTE — ED PROVIDER NOTES
[facility] EMERGENCY DEPARTMENT  EMERGENCY DEPARTMENT ENCOUNTER        Pt Name: Caitlyn Mitchell  MRN: [id number]  Birthdate 1940  Date of evaluation: 7/16/2025  Provider: Deuce Sutherland DO  PCP: Miguel Coleman MD  Note Started: 4:57 AM EDT 7/17/25    CHIEF COMPLAINT       Chief Complaint   Patient presents with    Chest Pain     Chest pain that srated today       HISTORY OF PRESENT ILLNESS: 1 or more Elements   History From: Patient    Limitations to history : None    Caitlyn Mitchell is a 85 y.o. female who presents to the Emergency Department for chest discomfort.  The patient states that throughout the day she has been having some intermittent painIn her chest.  She describes as a pain that is on the left side of her chest.  She states it is intermittent and worse when she moves or touches it.  Denies any shortness of breath.  No numbness tingling.  No nausea vomiting diarrhea.    Nursing Notes were all reviewed and agreed with or any disagreements were addressed in the HPI.      REVIEW OF EXTERNAL NOTE :       PDMP Monitoring:    Last PDMP Willie as Reviewed:  Review User Review Instant Review Result          Last Controlled Substance Monitoring Documentation      Flowsheet Row UC from 4/25/2019 in [facility]   Comments instructed to go to er for further treatment filed at 04/25/2019 1953          Urine Drug Screenings (1 yr)       Urine Drug Screen  Collected: 8/31/2018 10:10 PM (Final result)              Serum Drug Screen  Collected: 8/31/2018  8:55 PM (Final result)                  Medication Contract and Consent for Opioid Use Documents Filed        No documents found                      REVIEW OF SYSTEMS :           Positives and Pertinent negatives as per HPI.     SURGICAL HISTORY     Past Surgical History:   Procedure Laterality Date    CHOLECYSTECTOMY N/A     HYSTERECTOMY (CERVIX STATUS UNKNOWN) N/A     LITHOTRIPSY Left 08/05/2015    LEFT

## 2025-07-18 LAB
EKG ATRIAL RATE: 357 BPM
EKG Q-T INTERVAL: 324 MS
EKG QRS DURATION: 72 MS
EKG QTC CALCULATION (BAZETT): 402 MS
EKG R AXIS: -37 DEGREES
EKG T AXIS: 150 DEGREES
EKG VENTRICULAR RATE: 93 BPM

## 2025-07-18 PROCEDURE — 93010 ELECTROCARDIOGRAM REPORT: CPT | Performed by: INTERNAL MEDICINE

## 2025-07-24 ENCOUNTER — OUTSIDE SERVICES (OUTPATIENT)
Dept: PRIMARY CARE CLINIC | Age: 85
End: 2025-07-24

## 2025-07-24 DIAGNOSIS — Z98.890 S/P ORIF (OPEN REDUCTION INTERNAL FIXATION) FRACTURE: ICD-10-CM

## 2025-07-24 DIAGNOSIS — I11.0 HYPERTENSIVE HEART DISEASE WITH ACUTE SYSTOLIC CONGESTIVE HEART FAILURE (HCC): ICD-10-CM

## 2025-07-24 DIAGNOSIS — I48.91 ATRIAL FIBRILLATION, UNSPECIFIED TYPE (HCC): ICD-10-CM

## 2025-07-24 DIAGNOSIS — R53.1 GENERALIZED WEAKNESS: ICD-10-CM

## 2025-07-24 DIAGNOSIS — R41.841 COGNITIVE COMMUNICATION DEFICIT: ICD-10-CM

## 2025-07-24 DIAGNOSIS — E04.2 MULTIPLE THYROID NODULES: ICD-10-CM

## 2025-07-24 DIAGNOSIS — S82.001A CLOSED DISPLACED FRACTURE OF RIGHT PATELLA, UNSPECIFIED FRACTURE MORPHOLOGY, INITIAL ENCOUNTER: Primary | ICD-10-CM

## 2025-07-24 DIAGNOSIS — I50.9 ACUTE DECOMPENSATED HEART FAILURE (HCC): ICD-10-CM

## 2025-07-24 DIAGNOSIS — I10 PRIMARY HYPERTENSION: ICD-10-CM

## 2025-07-24 DIAGNOSIS — N17.9 AKI (ACUTE KIDNEY INJURY): ICD-10-CM

## 2025-07-24 DIAGNOSIS — S06.0X0D CONCUSSION WITHOUT LOSS OF CONSCIOUSNESS, SUBSEQUENT ENCOUNTER: ICD-10-CM

## 2025-07-24 DIAGNOSIS — I50.21 HYPERTENSIVE HEART DISEASE WITH ACUTE SYSTOLIC CONGESTIVE HEART FAILURE (HCC): ICD-10-CM

## 2025-07-24 DIAGNOSIS — R53.81 PHYSICAL DECONDITIONING: ICD-10-CM

## 2025-07-24 DIAGNOSIS — Z87.81 S/P ORIF (OPEN REDUCTION INTERNAL FIXATION) FRACTURE: ICD-10-CM

## 2025-07-25 DIAGNOSIS — Z87.81 S/P OPEN REDUCTION AND INTERNAL FIXATION (ORIF) OF FRACTURE OF RIGHT PATELLA: Primary | ICD-10-CM

## 2025-07-25 DIAGNOSIS — Z98.890 S/P OPEN REDUCTION AND INTERNAL FIXATION (ORIF) OF FRACTURE OF RIGHT PATELLA: Primary | ICD-10-CM

## 2025-07-25 NOTE — PROGRESS NOTES
2 Week Post op ORIF R patella fracture    Subjective: The patient is making good progress after ORIF R patella fracture surgery. In a wheelchair.  States she was learning how to do steps.  States TROM is uncomfortable and too long.   Continues at rehab facility.    Physical Exam: The right knee incision is healing well with no evidence of infection. No drainage or erythema is noted. Peroneal and tibial nerve function is intact. The foot is well perfused.     XRAYS ordered, obtained, and interpreted -   2 views R knee - well aligned patella without signs of hardware failure or loss of fixation.    Assessment: 2 weeks s/p ORIF R patella fracture    Plan:   The patient was encouraged to continue with the postoperative physical therapy program focusing on range of motion, muscle strengthening and gait mechanics.   Weight-bearing: WBAT  Brace 0-30 only - please see below for timing of brace adjustments    Brace needs to be adjusted for patient to maintain proper fit    Discontinue chemoprophylaxis at 1 month.   Signs/symptoms of DVT/PE were reviewed.   Warning signs of infection were discussed.   Pain management strategies were reviewed including ice, elevation, and rest. The importance of weaning from narcotic pain medication over time was discussed and emphasized.     POST OP PROTOCOL:  2-6 weeks - hinged knee brace, WBAT, 0-30 ROM only (assisted only)  6-9 weeks - 0-60 ROM (assisted then active)  9-12 weeks - 0-90 ROM  12+ weeks - unlock brace    RX for norco was printed for patient    Follow up: 6 weeks Postoperative with Right knee xrays (2 views only)     The patient's questions were addressed as completely as possible. the patient will contact us if function decreases, pain increases or if they have any other concerns or questions.

## 2025-07-29 ENCOUNTER — PROCEDURE VISIT (OUTPATIENT)
Dept: AUDIOLOGY | Age: 85
End: 2025-07-29

## 2025-07-29 ENCOUNTER — OFFICE VISIT (OUTPATIENT)
Dept: ORTHOPEDIC SURGERY | Age: 85
End: 2025-07-29

## 2025-07-29 VITALS — HEIGHT: 59 IN | WEIGHT: 142 LBS | BODY MASS INDEX: 28.63 KG/M2

## 2025-07-29 DIAGNOSIS — H90.3 SENSORINEURAL HEARING LOSS, BILATERAL: Primary | ICD-10-CM

## 2025-07-29 DIAGNOSIS — Z47.89 ORTHOPEDIC AFTERCARE: Primary | ICD-10-CM

## 2025-07-29 PROCEDURE — 99024 POSTOP FOLLOW-UP VISIT: CPT | Performed by: ORTHOPAEDIC SURGERY

## 2025-07-29 RX ORDER — HYDROCODONE BITARTRATE AND ACETAMINOPHEN 5; 325 MG/1; MG/1
1 TABLET ORAL EVERY 6 HOURS PRN
Qty: 28 TABLET | Refills: 0 | Status: SHIPPED | OUTPATIENT
Start: 2025-07-29 | End: 2025-08-05

## 2025-07-31 ENCOUNTER — OUTSIDE SERVICES (OUTPATIENT)
Dept: PRIMARY CARE CLINIC | Age: 85
End: 2025-07-31

## 2025-07-31 DIAGNOSIS — I10 PRIMARY HYPERTENSION: ICD-10-CM

## 2025-07-31 DIAGNOSIS — Z87.81 S/P ORIF (OPEN REDUCTION INTERNAL FIXATION) FRACTURE: ICD-10-CM

## 2025-07-31 DIAGNOSIS — I50.21 HYPERTENSIVE HEART DISEASE WITH ACUTE SYSTOLIC CONGESTIVE HEART FAILURE (HCC): ICD-10-CM

## 2025-07-31 DIAGNOSIS — S82.001A CLOSED DISPLACED FRACTURE OF RIGHT PATELLA, UNSPECIFIED FRACTURE MORPHOLOGY, INITIAL ENCOUNTER: Primary | ICD-10-CM

## 2025-07-31 DIAGNOSIS — E04.2 MULTIPLE THYROID NODULES: ICD-10-CM

## 2025-07-31 DIAGNOSIS — N17.9 AKI (ACUTE KIDNEY INJURY): ICD-10-CM

## 2025-07-31 DIAGNOSIS — I50.9 ACUTE DECOMPENSATED HEART FAILURE (HCC): ICD-10-CM

## 2025-07-31 DIAGNOSIS — I11.0 HYPERTENSIVE HEART DISEASE WITH ACUTE SYSTOLIC CONGESTIVE HEART FAILURE (HCC): ICD-10-CM

## 2025-07-31 DIAGNOSIS — Z98.890 S/P ORIF (OPEN REDUCTION INTERNAL FIXATION) FRACTURE: ICD-10-CM

## 2025-07-31 DIAGNOSIS — R41.841 COGNITIVE COMMUNICATION DEFICIT: ICD-10-CM

## 2025-07-31 DIAGNOSIS — I48.91 ATRIAL FIBRILLATION, UNSPECIFIED TYPE (HCC): ICD-10-CM

## 2025-07-31 DIAGNOSIS — R53.81 PHYSICAL DECONDITIONING: ICD-10-CM

## 2025-07-31 DIAGNOSIS — S06.0X0D CONCUSSION WITHOUT LOSS OF CONSCIOUSNESS, SUBSEQUENT ENCOUNTER: ICD-10-CM

## 2025-07-31 DIAGNOSIS — R53.1 GENERALIZED WEAKNESS: ICD-10-CM

## 2025-07-31 NOTE — PROGRESS NOTES
Patient's hearing aids cleaned and checked.    No other issues noted.    Patient to return on 8/26/2025, in conjunction with her [facility] appointment.    Lowell Horner CCC/A  Audiologist  [id number]  NPI#:  [id number]

## 2025-08-05 ENCOUNTER — OUTSIDE SERVICES (OUTPATIENT)
Dept: PRIMARY CARE CLINIC | Age: 85
End: 2025-08-05

## 2025-08-05 DIAGNOSIS — Z87.81 S/P ORIF (OPEN REDUCTION INTERNAL FIXATION) FRACTURE: ICD-10-CM

## 2025-08-05 DIAGNOSIS — I11.0 HYPERTENSIVE HEART DISEASE WITH ACUTE SYSTOLIC CONGESTIVE HEART FAILURE (HCC): ICD-10-CM

## 2025-08-05 DIAGNOSIS — S06.0X0D CONCUSSION WITHOUT LOSS OF CONSCIOUSNESS, SUBSEQUENT ENCOUNTER: ICD-10-CM

## 2025-08-05 DIAGNOSIS — I50.9 ACUTE DECOMPENSATED HEART FAILURE (HCC): ICD-10-CM

## 2025-08-05 DIAGNOSIS — I48.91 ATRIAL FIBRILLATION, UNSPECIFIED TYPE (HCC): ICD-10-CM

## 2025-08-05 DIAGNOSIS — S82.001A CLOSED DISPLACED FRACTURE OF RIGHT PATELLA, UNSPECIFIED FRACTURE MORPHOLOGY, INITIAL ENCOUNTER: Primary | ICD-10-CM

## 2025-08-05 DIAGNOSIS — I50.21 HYPERTENSIVE HEART DISEASE WITH ACUTE SYSTOLIC CONGESTIVE HEART FAILURE (HCC): ICD-10-CM

## 2025-08-05 DIAGNOSIS — E04.2 MULTIPLE THYROID NODULES: ICD-10-CM

## 2025-08-05 DIAGNOSIS — R53.1 GENERALIZED WEAKNESS: ICD-10-CM

## 2025-08-05 DIAGNOSIS — R41.841 COGNITIVE COMMUNICATION DEFICIT: ICD-10-CM

## 2025-08-05 DIAGNOSIS — Z98.890 S/P ORIF (OPEN REDUCTION INTERNAL FIXATION) FRACTURE: ICD-10-CM

## 2025-08-05 DIAGNOSIS — R53.81 PHYSICAL DECONDITIONING: ICD-10-CM

## 2025-08-05 DIAGNOSIS — N17.9 AKI (ACUTE KIDNEY INJURY): ICD-10-CM

## 2025-08-05 DIAGNOSIS — I10 PRIMARY HYPERTENSION: ICD-10-CM

## 2025-08-06 PROBLEM — E86.0 DEHYDRATION: Status: RESOLVED | Noted: 2025-07-07 | Resolved: 2025-08-06

## 2025-08-07 ENCOUNTER — OUTSIDE SERVICES (OUTPATIENT)
Dept: PRIMARY CARE CLINIC | Age: 85
End: 2025-08-07

## 2025-08-07 DIAGNOSIS — S82.001A CLOSED DISPLACED FRACTURE OF RIGHT PATELLA, UNSPECIFIED FRACTURE MORPHOLOGY, INITIAL ENCOUNTER: Primary | ICD-10-CM

## 2025-08-07 DIAGNOSIS — R53.81 PHYSICAL DECONDITIONING: ICD-10-CM

## 2025-08-07 DIAGNOSIS — Z87.81 S/P ORIF (OPEN REDUCTION INTERNAL FIXATION) FRACTURE: ICD-10-CM

## 2025-08-07 DIAGNOSIS — I50.21 HYPERTENSIVE HEART DISEASE WITH ACUTE SYSTOLIC CONGESTIVE HEART FAILURE (HCC): ICD-10-CM

## 2025-08-07 DIAGNOSIS — Z98.890 S/P ORIF (OPEN REDUCTION INTERNAL FIXATION) FRACTURE: ICD-10-CM

## 2025-08-07 DIAGNOSIS — I50.9 ACUTE DECOMPENSATED HEART FAILURE (HCC): ICD-10-CM

## 2025-08-07 DIAGNOSIS — E04.2 MULTIPLE THYROID NODULES: ICD-10-CM

## 2025-08-07 DIAGNOSIS — I48.91 ATRIAL FIBRILLATION, UNSPECIFIED TYPE (HCC): ICD-10-CM

## 2025-08-07 DIAGNOSIS — I10 PRIMARY HYPERTENSION: ICD-10-CM

## 2025-08-07 DIAGNOSIS — R41.841 COGNITIVE COMMUNICATION DEFICIT: ICD-10-CM

## 2025-08-07 DIAGNOSIS — I11.0 HYPERTENSIVE HEART DISEASE WITH ACUTE SYSTOLIC CONGESTIVE HEART FAILURE (HCC): ICD-10-CM

## 2025-08-07 DIAGNOSIS — R53.1 GENERALIZED WEAKNESS: ICD-10-CM

## 2025-08-07 DIAGNOSIS — S06.0X0D CONCUSSION WITHOUT LOSS OF CONSCIOUSNESS, SUBSEQUENT ENCOUNTER: ICD-10-CM

## 2025-08-07 DIAGNOSIS — N17.9 AKI (ACUTE KIDNEY INJURY): ICD-10-CM

## 2025-08-12 ENCOUNTER — OUTSIDE SERVICES (OUTPATIENT)
Dept: PRIMARY CARE CLINIC | Age: 85
End: 2025-08-12

## 2025-08-12 DIAGNOSIS — Z98.890 S/P ORIF (OPEN REDUCTION INTERNAL FIXATION) FRACTURE: ICD-10-CM

## 2025-08-12 DIAGNOSIS — I10 PRIMARY HYPERTENSION: ICD-10-CM

## 2025-08-12 DIAGNOSIS — E04.2 MULTIPLE THYROID NODULES: ICD-10-CM

## 2025-08-12 DIAGNOSIS — I50.21 HYPERTENSIVE HEART DISEASE WITH ACUTE SYSTOLIC CONGESTIVE HEART FAILURE (HCC): ICD-10-CM

## 2025-08-12 DIAGNOSIS — I48.91 ATRIAL FIBRILLATION, UNSPECIFIED TYPE (HCC): ICD-10-CM

## 2025-08-12 DIAGNOSIS — I11.0 HYPERTENSIVE HEART DISEASE WITH ACUTE SYSTOLIC CONGESTIVE HEART FAILURE (HCC): ICD-10-CM

## 2025-08-12 DIAGNOSIS — R53.1 GENERALIZED WEAKNESS: ICD-10-CM

## 2025-08-12 DIAGNOSIS — N17.9 AKI (ACUTE KIDNEY INJURY): ICD-10-CM

## 2025-08-12 DIAGNOSIS — S82.001A CLOSED DISPLACED FRACTURE OF RIGHT PATELLA, UNSPECIFIED FRACTURE MORPHOLOGY, INITIAL ENCOUNTER: Primary | ICD-10-CM

## 2025-08-12 DIAGNOSIS — R53.81 PHYSICAL DECONDITIONING: ICD-10-CM

## 2025-08-12 DIAGNOSIS — S06.0X0D CONCUSSION WITHOUT LOSS OF CONSCIOUSNESS, SUBSEQUENT ENCOUNTER: ICD-10-CM

## 2025-08-12 DIAGNOSIS — Z87.81 S/P ORIF (OPEN REDUCTION INTERNAL FIXATION) FRACTURE: ICD-10-CM

## 2025-08-12 DIAGNOSIS — R41.841 COGNITIVE COMMUNICATION DEFICIT: ICD-10-CM

## 2025-08-12 DIAGNOSIS — I50.9 ACUTE DECOMPENSATED HEART FAILURE (HCC): ICD-10-CM

## 2025-08-18 ENCOUNTER — APPOINTMENT (OUTPATIENT)
Dept: CT IMAGING | Age: 85
End: 2025-08-18
Payer: MEDICARE

## 2025-08-18 ENCOUNTER — APPOINTMENT (OUTPATIENT)
Dept: GENERAL RADIOLOGY | Age: 85
End: 2025-08-18
Payer: MEDICARE

## 2025-08-18 ENCOUNTER — HOSPITAL ENCOUNTER (EMERGENCY)
Age: 85
Discharge: HOME | End: 2025-08-19
Attending: EMERGENCY MEDICINE
Payer: MEDICARE

## 2025-08-18 DIAGNOSIS — I10 HYPERTENSION, UNSPECIFIED TYPE: Primary | ICD-10-CM

## 2025-08-18 LAB
ALBUMIN SERPL-MCNC: 3.8 G/DL (ref 3.5–5.2)
ALP SERPL-CCNC: 139 U/L (ref 35–104)
ALT SERPL-CCNC: 21 U/L (ref 0–35)
ANION GAP SERPL CALCULATED.3IONS-SCNC: 13 MMOL/L (ref 7–16)
AST SERPL-CCNC: 32 U/L (ref 0–35)
BASOPHILS # BLD: 0.04 K/UL (ref 0–0.2)
BASOPHILS NFR BLD: 1 % (ref 0–2)
BILIRUB SERPL-MCNC: 0.5 MG/DL (ref 0–1.2)
BNP SERPL-MCNC: 3282 PG/ML (ref 0–450)
BUN SERPL-MCNC: 19 MG/DL (ref 8–23)
CALCIUM SERPL-MCNC: 9.5 MG/DL (ref 8.8–10.2)
CHLORIDE SERPL-SCNC: 111 MMOL/L (ref 98–107)
CO2 SERPL-SCNC: 19 MMOL/L (ref 22–29)
CREAT SERPL-MCNC: 1 MG/DL (ref 0.5–1)
EKG ATRIAL RATE: 208 BPM
EKG Q-T INTERVAL: 322 MS
EKG QRS DURATION: 66 MS
EKG QTC CALCULATION (BAZETT): 383 MS
EKG R AXIS: -32 DEGREES
EKG T AXIS: 145 DEGREES
EKG VENTRICULAR RATE: 85 BPM
EOSINOPHIL # BLD: 0.12 K/UL (ref 0.05–0.5)
EOSINOPHILS RELATIVE PERCENT: 2 % (ref 0–6)
ERYTHROCYTE [DISTWIDTH] IN BLOOD BY AUTOMATED COUNT: 16.3 % (ref 11.5–15)
GFR, ESTIMATED: 55 ML/MIN/1.73M2
GLUCOSE SERPL-MCNC: 95 MG/DL (ref 74–99)
HCT VFR BLD AUTO: 36.4 % (ref 34–48)
HGB BLD-MCNC: 11.5 G/DL (ref 11.5–15.5)
IMM GRANULOCYTES # BLD AUTO: 0.04 K/UL (ref 0–0.58)
IMM GRANULOCYTES NFR BLD: 1 % (ref 0–5)
LYMPHOCYTES NFR BLD: 0.88 K/UL (ref 1.5–4)
LYMPHOCYTES RELATIVE PERCENT: 12 % (ref 20–42)
MCH RBC QN AUTO: 29 PG (ref 26–35)
MCHC RBC AUTO-ENTMCNC: 31.6 G/DL (ref 32–34.5)
MCV RBC AUTO: 91.9 FL (ref 80–99.9)
MONOCYTES NFR BLD: 0.51 K/UL (ref 0.1–0.95)
MONOCYTES NFR BLD: 7 % (ref 2–12)
NEUTROPHILS NFR BLD: 78 % (ref 43–80)
NEUTS SEG NFR BLD: 5.6 K/UL (ref 1.8–7.3)
PLATELET # BLD AUTO: 195 K/UL (ref 130–450)
PMV BLD AUTO: 10.2 FL (ref 7–12)
POTASSIUM SERPL-SCNC: 4.6 MMOL/L (ref 3.5–5.1)
PROT SERPL-MCNC: 6.1 G/DL (ref 6.4–8.3)
RBC # BLD AUTO: 3.96 M/UL (ref 3.5–5.5)
SODIUM SERPL-SCNC: 143 MMOL/L (ref 136–145)
TROPONIN I SERPL HS-MCNC: 30 NG/L (ref 0–14)
TROPONIN I SERPL HS-MCNC: 32 NG/L (ref 0–14)
WBC OTHER # BLD: 7.2 K/UL (ref 4.5–11.5)

## 2025-08-18 PROCEDURE — 85025 COMPLETE CBC W/AUTO DIFF WBC: CPT

## 2025-08-18 PROCEDURE — 93005 ELECTROCARDIOGRAM TRACING: CPT

## 2025-08-18 PROCEDURE — 84484 ASSAY OF TROPONIN QUANT: CPT

## 2025-08-18 PROCEDURE — 2580000003 HC RX 258

## 2025-08-18 PROCEDURE — 70450 CT HEAD/BRAIN W/O DYE: CPT

## 2025-08-18 PROCEDURE — 80053 COMPREHEN METABOLIC PANEL: CPT

## 2025-08-18 PROCEDURE — 6360000002 HC RX W HCPCS

## 2025-08-18 PROCEDURE — 6370000000 HC RX 637 (ALT 250 FOR IP)

## 2025-08-18 PROCEDURE — 71045 X-RAY EXAM CHEST 1 VIEW: CPT

## 2025-08-18 PROCEDURE — 99285 EMERGENCY DEPT VISIT HI MDM: CPT

## 2025-08-18 PROCEDURE — 93010 ELECTROCARDIOGRAM REPORT: CPT | Performed by: INTERNAL MEDICINE

## 2025-08-18 PROCEDURE — 96375 TX/PRO/DX INJ NEW DRUG ADDON: CPT

## 2025-08-18 PROCEDURE — 96374 THER/PROPH/DIAG INJ IV PUSH: CPT

## 2025-08-18 PROCEDURE — 83880 ASSAY OF NATRIURETIC PEPTIDE: CPT

## 2025-08-18 RX ORDER — HYDRALAZINE HYDROCHLORIDE 25 MG/1
25 TABLET, FILM COATED ORAL ONCE
Status: COMPLETED | OUTPATIENT
Start: 2025-08-18 | End: 2025-08-18

## 2025-08-18 RX ORDER — LABETALOL HYDROCHLORIDE 5 MG/ML
20 INJECTION, SOLUTION INTRAVENOUS ONCE
Status: DISCONTINUED | OUTPATIENT
Start: 2025-08-18 | End: 2025-08-18

## 2025-08-18 RX ORDER — METOPROLOL SUCCINATE 25 MG/1
25 TABLET, EXTENDED RELEASE ORAL ONCE
Status: COMPLETED | OUTPATIENT
Start: 2025-08-18 | End: 2025-08-18

## 2025-08-18 RX ORDER — HYDRALAZINE HYDROCHLORIDE 20 MG/ML
10 INJECTION INTRAMUSCULAR; INTRAVENOUS ONCE
Status: COMPLETED | OUTPATIENT
Start: 2025-08-18 | End: 2025-08-18

## 2025-08-18 RX ORDER — METOPROLOL TARTRATE 1 MG/ML
5 INJECTION, SOLUTION INTRAVENOUS ONCE
Status: COMPLETED | OUTPATIENT
Start: 2025-08-18 | End: 2025-08-18

## 2025-08-18 RX ORDER — 0.9 % SODIUM CHLORIDE 0.9 %
500 INTRAVENOUS SOLUTION INTRAVENOUS ONCE
Status: COMPLETED | OUTPATIENT
Start: 2025-08-18 | End: 2025-08-18

## 2025-08-18 RX ADMIN — METOPROLOL SUCCINATE 25 MG: 25 TABLET, EXTENDED RELEASE ORAL at 20:36

## 2025-08-18 RX ADMIN — SODIUM CHLORIDE 500 ML: 0.9 INJECTION, SOLUTION INTRAVENOUS at 13:56

## 2025-08-18 RX ADMIN — HYDRALAZINE HYDROCHLORIDE 10 MG: 20 INJECTION INTRAMUSCULAR; INTRAVENOUS at 12:14

## 2025-08-18 RX ADMIN — HYDRALAZINE HYDROCHLORIDE 25 MG: 25 TABLET ORAL at 20:36

## 2025-08-18 RX ADMIN — METOPROLOL TARTRATE 5 MG: 5 INJECTION INTRAVENOUS at 15:08

## 2025-08-18 ASSESSMENT — LIFESTYLE VARIABLES
HOW MANY STANDARD DRINKS CONTAINING ALCOHOL DO YOU HAVE ON A TYPICAL DAY: PATIENT DOES NOT DRINK
HOW OFTEN DO YOU HAVE A DRINK CONTAINING ALCOHOL: NEVER
HOW OFTEN DO YOU HAVE A DRINK CONTAINING ALCOHOL: NEVER

## 2025-08-18 ASSESSMENT — PAIN SCALES - GENERAL
PAINLEVEL_OUTOF10: 0
PAINLEVEL_OUTOF10: 0

## 2025-08-18 ASSESSMENT — PAIN - FUNCTIONAL ASSESSMENT: PAIN_FUNCTIONAL_ASSESSMENT: 0-10

## 2025-08-19 VITALS
DIASTOLIC BLOOD PRESSURE: 67 MMHG | OXYGEN SATURATION: 96 % | TEMPERATURE: 97.9 F | HEIGHT: 59 IN | BODY MASS INDEX: 28.63 KG/M2 | SYSTOLIC BLOOD PRESSURE: 133 MMHG | HEART RATE: 72 BPM | RESPIRATION RATE: 16 BRPM | WEIGHT: 142 LBS

## 2025-08-19 PROCEDURE — 96375 TX/PRO/DX INJ NEW DRUG ADDON: CPT

## 2025-08-19 PROCEDURE — 6360000002 HC RX W HCPCS

## 2025-08-19 RX ORDER — LABETALOL HYDROCHLORIDE 5 MG/ML
INJECTION, SOLUTION INTRAVENOUS
Status: COMPLETED
Start: 2025-08-19 | End: 2025-08-19

## 2025-08-19 RX ORDER — LABETALOL HYDROCHLORIDE 5 MG/ML
10 INJECTION, SOLUTION INTRAVENOUS ONCE
Status: COMPLETED | OUTPATIENT
Start: 2025-08-19 | End: 2025-08-19

## 2025-08-19 RX ADMIN — LABETALOL HYDROCHLORIDE 10 MG: 5 INJECTION, SOLUTION INTRAVENOUS at 00:31

## 2025-08-19 RX ADMIN — LABETALOL HYDROCHLORIDE 10 MG: 5 INJECTION INTRAVENOUS at 00:31

## 2025-08-20 ASSESSMENT — ENCOUNTER SYMPTOMS
EYE ITCHING: 0
CHEST TIGHTNESS: 0
CHEST TIGHTNESS: 0
VOMITING: 0
ABDOMINAL DISTENTION: 0
SORE THROAT: 0
SORE THROAT: 0
CONSTIPATION: 0
EYE REDNESS: 0
EYE REDNESS: 0
ABDOMINAL PAIN: 0
CONSTIPATION: 0
SINUS PRESSURE: 0
EYE DISCHARGE: 0
DIARRHEA: 0
WHEEZING: 0
ABDOMINAL DISTENTION: 0
WHEEZING: 0
NAUSEA: 0
EYE ITCHING: 0
NAUSEA: 0
NAUSEA: 0
DIARRHEA: 0
SINUS PRESSURE: 0
VOMITING: 0
ABDOMINAL DISTENTION: 0
DIARRHEA: 0
COUGH: 0
SORE THROAT: 0
ABDOMINAL PAIN: 0
NAUSEA: 0
COUGH: 0
SINUS PRESSURE: 0
EYE DISCHARGE: 0
SINUS PRESSURE: 0
EYE DISCHARGE: 0
EYE PAIN: 0
SHORTNESS OF BREATH: 0
COUGH: 0
EYE ITCHING: 0
DIARRHEA: 0
ABDOMINAL PAIN: 0
COUGH: 0
NAUSEA: 0
SINUS PRESSURE: 0
CONSTIPATION: 0
ABDOMINAL DISTENTION: 0
EYE PAIN: 0
SHORTNESS OF BREATH: 0
ABDOMINAL PAIN: 0
WHEEZING: 0
DIARRHEA: 0
COUGH: 0
SORE THROAT: 0
ABDOMINAL DISTENTION: 0
EYE DISCHARGE: 0
VOMITING: 0
EYE REDNESS: 0
EYE REDNESS: 0
ABDOMINAL PAIN: 0
EYE ITCHING: 0
CHEST TIGHTNESS: 0
EYE PAIN: 0
SINUS PRESSURE: 0
VOMITING: 0
NAUSEA: 0
CHEST TIGHTNESS: 0
EYE PAIN: 0
WHEEZING: 0
VOMITING: 0
EYE DISCHARGE: 0
EYE ITCHING: 0
EYE REDNESS: 0
SHORTNESS OF BREATH: 0
EYE DISCHARGE: 0
EYE PAIN: 0
ABDOMINAL PAIN: 0
EYE ITCHING: 0
SINUS PRESSURE: 0
CHEST TIGHTNESS: 0
DIARRHEA: 0
CONSTIPATION: 0
SHORTNESS OF BREATH: 0
ABDOMINAL DISTENTION: 0
EYE REDNESS: 0
EYE REDNESS: 0
CHEST TIGHTNESS: 0
SORE THROAT: 0
CONSTIPATION: 0
SHORTNESS OF BREATH: 0
WHEEZING: 0
CHEST TIGHTNESS: 0
COUGH: 0
EYE PAIN: 0
COUGH: 0
SORE THROAT: 0
SORE THROAT: 0
CONSTIPATION: 0
EYE DISCHARGE: 0
EYE PAIN: 0
NAUSEA: 0
SHORTNESS OF BREATH: 0
WHEEZING: 0
VOMITING: 0
DIARRHEA: 0
ABDOMINAL PAIN: 0
ABDOMINAL DISTENTION: 0
VOMITING: 0
EYE ITCHING: 0
CONSTIPATION: 0
SHORTNESS OF BREATH: 0
WHEEZING: 0

## 2025-08-22 DIAGNOSIS — M25.561 ACUTE PAIN OF RIGHT KNEE: Primary | ICD-10-CM

## 2025-08-26 ENCOUNTER — PROCEDURE VISIT (OUTPATIENT)
Dept: AUDIOLOGY | Age: 85
End: 2025-08-26

## 2025-08-26 ENCOUNTER — OFFICE VISIT (OUTPATIENT)
Dept: ORTHOPEDIC SURGERY | Age: 85
End: 2025-08-26

## 2025-08-26 VITALS — WEIGHT: 142 LBS | HEIGHT: 59 IN | BODY MASS INDEX: 28.63 KG/M2

## 2025-08-26 DIAGNOSIS — Z47.89 ORTHOPEDIC AFTERCARE: Primary | ICD-10-CM

## 2025-08-26 DIAGNOSIS — H90.3 SENSORINEURAL HEARING LOSS, BILATERAL: Primary | ICD-10-CM

## 2025-08-26 PROCEDURE — 99024 POSTOP FOLLOW-UP VISIT: CPT | Performed by: ORTHOPAEDIC SURGERY

## 2025-09-03 ENCOUNTER — TELEPHONE (OUTPATIENT)
Dept: AUDIOLOGY | Age: 85
End: 2025-09-03

## (undated) DEVICE — BIT DRL L160MM DIA2.7MM ST CANN QUIK CPL NONRADIOLUCENT ADJ

## (undated) DEVICE — SYRINGE MED 50ML LUERLOCK TIP

## (undated) DEVICE — SOLUTION IRRIG 1000ML 09% SOD CHL USP PIC PLAS CONTAINER

## (undated) DEVICE — GLOVE SURG SZ 7 L12IN FNGR THK79MIL GRN LTX FREE

## (undated) DEVICE — GOWN,SIRUS,FABRNF,XL,20/CS: Brand: MEDLINE

## (undated) DEVICE — C-ARMOR C-ARM EQUIPMENT COVERS CLEAR STERILE UNIVERSAL FIT 12 PER CASE: Brand: C-ARMOR

## (undated) DEVICE — GLOVE ORANGE PI 7   MSG9070

## (undated) DEVICE — SPLINT KNEE UNIV FOR LESS THAN 36IN L24IN FOAM LAM ELAS CNTCT

## (undated) DEVICE — BASIC DOUBLE BASIN 2-LF: Brand: MEDLINE INDUSTRIES, INC.

## (undated) DEVICE — GLOVE ORTHO 7   MSG9470

## (undated) DEVICE — GUIDEWIRE ORTH L150MM DIA1.25MM S STL NTHRD FOR 4MM CANN

## (undated) DEVICE — TOTAL KNEE PACK: Brand: MEDLINE INDUSTRIES, INC.

## (undated) DEVICE — 4-PORT MANIFOLD: Brand: NEPTUNE 2

## (undated) DEVICE — ELECTRODE PT RET AD L9FT HI MOIST COND ADH HYDRGEL CORDED

## (undated) DEVICE — ZIMMER® STERILE DISPOSABLE TOURNIQUET CUFF WITH PROTECTIVE SLEEVE AND PLC, DUAL PORT, SINGLE BLADDER, 30 IN. (76 CM)

## (undated) DEVICE — BLADE,STAINLESS-STEEL,10,STRL,DISPOSABLE: Brand: MEDLINE

## (undated) DEVICE — WIPES SKIN CLOTH READYPREP 9 X 10.5 IN 2% CHLORHEX GLUCONATE CHG PREOP